# Patient Record
Sex: FEMALE | Race: WHITE | NOT HISPANIC OR LATINO | URBAN - METROPOLITAN AREA
[De-identification: names, ages, dates, MRNs, and addresses within clinical notes are randomized per-mention and may not be internally consistent; named-entity substitution may affect disease eponyms.]

---

## 2023-02-21 PROBLEM — R10.2 PELVIC CRAMPING: Status: ACTIVE | Noted: 2023-02-21

## 2023-02-21 PROBLEM — N20.0 NEPHROLITHIASIS: Status: ACTIVE | Noted: 2023-02-21

## 2023-02-21 PROBLEM — H52.203 ASTIGMATISM OF BOTH EYES: Status: ACTIVE | Noted: 2023-02-21

## 2023-02-21 PROBLEM — M25.372 OTHER INSTABILITY, LEFT ANKLE: Status: ACTIVE | Noted: 2023-02-21

## 2023-02-21 PROBLEM — B37.81 CANDIDA ESOPHAGITIS (MULTI): Status: ACTIVE | Noted: 2023-02-21

## 2023-02-21 PROBLEM — I10 HYPERTENSION: Status: ACTIVE | Noted: 2023-02-21

## 2023-02-21 PROBLEM — K59.00 CONSTIPATION: Status: ACTIVE | Noted: 2023-02-21

## 2023-02-21 PROBLEM — H52.4 ASTIGMATISM OF BOTH EYES WITH PRESBYOPIA: Status: ACTIVE | Noted: 2023-02-21

## 2023-02-21 PROBLEM — H02.839 DERMATOCHALASIS: Status: ACTIVE | Noted: 2023-02-21

## 2023-02-21 PROBLEM — J45.901 ASTHMA EXACERBATION (HHS-HCC): Status: ACTIVE | Noted: 2023-02-21

## 2023-02-21 PROBLEM — R06.00 DYSPNEA: Status: ACTIVE | Noted: 2023-02-21

## 2023-02-21 PROBLEM — J38.3 VOCAL CORD DYSFUNCTION: Status: ACTIVE | Noted: 2023-02-21

## 2023-02-21 PROBLEM — R31.9 BLOOD IN URINE: Status: ACTIVE | Noted: 2023-02-21

## 2023-02-21 PROBLEM — N76.0 VAGINITIS: Status: ACTIVE | Noted: 2023-02-21

## 2023-02-21 PROBLEM — H04.123 DRY EYES: Status: ACTIVE | Noted: 2023-02-21

## 2023-02-21 PROBLEM — B37.0 THRUSH: Status: ACTIVE | Noted: 2023-02-21

## 2023-02-21 PROBLEM — B35.1 NAIL FUNGUS: Status: ACTIVE | Noted: 2023-02-21

## 2023-02-21 PROBLEM — E55.9 VITAMIN D DEFICIENCY: Status: ACTIVE | Noted: 2023-02-21

## 2023-02-21 PROBLEM — F32.A DEPRESSION: Status: ACTIVE | Noted: 2023-02-21

## 2023-02-21 PROBLEM — R79.9 ABNORMAL BLOOD CHEMISTRY: Status: ACTIVE | Noted: 2023-02-21

## 2023-02-21 PROBLEM — M81.0 OSTEOPOROSIS: Status: ACTIVE | Noted: 2023-02-21

## 2023-02-21 PROBLEM — E04.9 THYROID ENLARGEMENT: Status: ACTIVE | Noted: 2023-02-21

## 2023-02-21 PROBLEM — R21 GROIN RASH: Status: ACTIVE | Noted: 2023-02-21

## 2023-02-21 PROBLEM — K63.5 BENIGN COLONIC POLYP: Status: ACTIVE | Noted: 2023-02-21

## 2023-02-21 PROBLEM — H52.209 ASTIGMATISM: Status: ACTIVE | Noted: 2023-02-21

## 2023-02-21 PROBLEM — M54.9 BACK PAIN: Status: ACTIVE | Noted: 2023-02-21

## 2023-02-21 PROBLEM — M79.89 SWELLING OF THIGH: Status: ACTIVE | Noted: 2023-02-21

## 2023-02-21 PROBLEM — F98.8 ATTENTION DEFICIT DISORDER WITHOUT HYPERACTIVITY: Status: ACTIVE | Noted: 2023-02-21

## 2023-02-21 PROBLEM — J31.0 RHINITIS: Status: ACTIVE | Noted: 2023-02-21

## 2023-02-21 PROBLEM — S90.02XA CONTUSION OF ANKLE, LEFT: Status: ACTIVE | Noted: 2023-02-21

## 2023-02-21 PROBLEM — R39.89 URINARY PROBLEM: Status: ACTIVE | Noted: 2023-02-21

## 2023-02-21 PROBLEM — H52.223 MYOPIA OF BOTH EYES WITH REGULAR ASTIGMATISM: Status: ACTIVE | Noted: 2023-02-21

## 2023-02-21 PROBLEM — N60.19 FIBROCYSTIC BREAST DISEASE: Status: ACTIVE | Noted: 2023-02-21

## 2023-02-21 PROBLEM — J01.90 ACUTE SINUSITIS: Status: ACTIVE | Noted: 2023-02-21

## 2023-02-21 PROBLEM — S83.8X9A MENISCAL INJURY: Status: ACTIVE | Noted: 2023-02-21

## 2023-02-21 PROBLEM — E78.5 HYPERLIPIDEMIA: Status: ACTIVE | Noted: 2023-02-21

## 2023-02-21 PROBLEM — L30.9 DERMATITIS, ECZEMATOID: Status: ACTIVE | Noted: 2023-02-21

## 2023-02-21 PROBLEM — R53.83 FATIGUE: Status: ACTIVE | Noted: 2023-02-21

## 2023-02-21 PROBLEM — R79.89 ELEVATED LFTS: Status: ACTIVE | Noted: 2023-02-21

## 2023-02-21 PROBLEM — K58.9 IBS (IRRITABLE BOWEL SYNDROME): Status: ACTIVE | Noted: 2023-02-21

## 2023-02-21 PROBLEM — F31.81 BIPOLAR II DISORDER (MULTI): Status: ACTIVE | Noted: 2023-02-21

## 2023-02-21 PROBLEM — R10.9 ABDOMINAL CRAMPS: Status: ACTIVE | Noted: 2023-02-21

## 2023-02-21 PROBLEM — E21.3 HYPERPARATHYROIDISM (MULTI): Status: ACTIVE | Noted: 2023-02-21

## 2023-02-21 PROBLEM — M25.572 LEFT ANKLE PAIN: Status: ACTIVE | Noted: 2023-02-21

## 2023-02-21 PROBLEM — L23.89 ALLERGIC CONTACT DERMATITIS DUE TO OTHER AGENTS: Status: ACTIVE | Noted: 2023-02-21

## 2023-02-21 PROBLEM — H52.13 MYOPIA OF BOTH EYES WITH REGULAR ASTIGMATISM: Status: ACTIVE | Noted: 2023-02-21

## 2023-02-21 PROBLEM — J32.9 SINUSITIS: Status: ACTIVE | Noted: 2023-02-21

## 2023-02-21 PROBLEM — G47.10 ORGANIC HYPERSOMNIA: Status: ACTIVE | Noted: 2023-02-21

## 2023-02-21 PROBLEM — N95.1 VAGINAL DRYNESS, MENOPAUSAL: Status: ACTIVE | Noted: 2023-02-21

## 2023-02-21 PROBLEM — G93.32 CHRONIC FATIGUE SYNDROME: Status: ACTIVE | Noted: 2023-02-21

## 2023-02-21 PROBLEM — B97.7 HUMAN PAPILLOMA VIRUS INFECTION: Status: ACTIVE | Noted: 2023-02-21

## 2023-02-21 PROBLEM — J45.909 ASTHMA (HHS-HCC): Status: ACTIVE | Noted: 2023-02-21

## 2023-02-21 PROBLEM — M25.50 ARTHRALGIA: Status: ACTIVE | Noted: 2023-02-21

## 2023-02-21 PROBLEM — H25.813 COMBINED FORM OF AGE-RELATED CATARACT, BOTH EYES: Status: ACTIVE | Noted: 2023-02-21

## 2023-02-21 PROBLEM — I73.00 RAYNAUD PHENOMENON: Status: ACTIVE | Noted: 2023-02-21

## 2023-02-21 PROBLEM — J30.0 VASOMOTOR RHINITIS: Status: ACTIVE | Noted: 2023-02-21

## 2023-02-21 PROBLEM — H10.31 ACUTE BACTERIAL CONJUNCTIVITIS OF RIGHT EYE: Status: ACTIVE | Noted: 2023-02-21

## 2023-02-21 PROBLEM — J30.89 ALLERGIC RHINITIS DUE TO MOLD: Status: ACTIVE | Noted: 2023-02-21

## 2023-02-21 PROBLEM — R30.0 DYSURIA: Status: ACTIVE | Noted: 2023-02-21

## 2023-02-21 PROBLEM — H52.209 ASTIGMATISM: Status: RESOLVED | Noted: 2023-02-21 | Resolved: 2023-02-21

## 2023-02-21 PROBLEM — K21.9 GERD WITHOUT ESOPHAGITIS: Status: ACTIVE | Noted: 2023-02-21

## 2023-02-21 PROBLEM — F90.9 ADHD (ATTENTION DEFICIT HYPERACTIVITY DISORDER): Status: ACTIVE | Noted: 2023-02-21

## 2023-02-21 PROBLEM — H52.4 BILATERAL PRESBYOPIA: Status: ACTIVE | Noted: 2023-02-21

## 2023-02-21 PROBLEM — M25.562 ACUTE PAIN OF LEFT KNEE: Status: ACTIVE | Noted: 2023-02-21

## 2023-02-21 PROBLEM — M54.50 LOWER BACK PAIN: Status: ACTIVE | Noted: 2023-02-21

## 2023-02-21 PROBLEM — R31.0 HEMATURIA, GROSS: Status: ACTIVE | Noted: 2023-02-21

## 2023-02-21 PROBLEM — H52.00 HYPEROPIA: Status: ACTIVE | Noted: 2023-02-21

## 2023-02-21 RX ORDER — CICLOPIROX 7.7 MG/G
GEL TOPICAL 2 TIMES DAILY
COMMUNITY
Start: 2021-09-23 | End: 2023-03-27 | Stop reason: SDUPTHER

## 2023-02-21 RX ORDER — LITHIUM CARBONATE 300 MG/1
1 TABLET, FILM COATED, EXTENDED RELEASE ORAL DAILY
COMMUNITY
Start: 2016-12-30

## 2023-02-21 RX ORDER — LAMOTRIGINE 200 MG/1
1 TABLET ORAL DAILY
COMMUNITY
Start: 2016-09-08 | End: 2024-04-25 | Stop reason: SDUPTHER

## 2023-02-21 RX ORDER — ALCLOMETASONE DIPROPIONATE 0.5 MG/G
CREAM TOPICAL
COMMUNITY
Start: 2022-01-28 | End: 2023-11-26

## 2023-02-21 RX ORDER — DENOSUMAB 60 MG/ML
60 INJECTION SUBCUTANEOUS
COMMUNITY
Start: 2019-09-08 | End: 2023-11-24

## 2023-02-21 RX ORDER — LISDEXAMFETAMINE DIMESYLATE 60 MG/1
1 CAPSULE ORAL EVERY MORNING
COMMUNITY
Start: 2020-06-23 | End: 2024-05-22 | Stop reason: WASHOUT

## 2023-02-21 RX ORDER — METOPROLOL SUCCINATE 25 MG/1
0.5 TABLET, EXTENDED RELEASE ORAL DAILY
COMMUNITY
Start: 2013-05-31 | End: 2023-06-02 | Stop reason: SDUPTHER

## 2023-02-21 RX ORDER — CHOLECALCIFEROL (VITAMIN D3) 125 MCG
1 CAPSULE ORAL DAILY
COMMUNITY
Start: 2013-05-30

## 2023-02-21 RX ORDER — ALBUTEROL SULFATE 90 UG/1
2 AEROSOL, METERED RESPIRATORY (INHALATION)
COMMUNITY
Start: 2013-02-15 | End: 2024-01-15 | Stop reason: SDUPTHER

## 2023-02-21 RX ORDER — OMEPRAZOLE 20 MG/1
1 CAPSULE, DELAYED RELEASE ORAL
COMMUNITY
Start: 2016-09-26 | End: 2024-01-15 | Stop reason: SDUPTHER

## 2023-02-21 RX ORDER — MOMETASONE FUROATE AND FORMOTEROL FUMARATE DIHYDRATE 200; 5 UG/1; UG/1
2 AEROSOL RESPIRATORY (INHALATION) 2 TIMES DAILY
COMMUNITY
Start: 2022-06-10 | End: 2024-01-15 | Stop reason: SDUPTHER

## 2023-02-21 RX ORDER — MOMETASONE FUROATE AND FORMOTEROL FUMARATE DIHYDRATE 100; 5 UG/1; UG/1
2 AEROSOL RESPIRATORY (INHALATION)
COMMUNITY
Start: 2021-05-25 | End: 2024-01-15 | Stop reason: WASHOUT

## 2023-02-21 RX ORDER — LEVOCETIRIZINE DIHYDROCHLORIDE 5 MG/1
1 TABLET, FILM COATED ORAL EVERY EVENING
COMMUNITY
Start: 2022-01-28 | End: 2024-01-15 | Stop reason: SDUPTHER

## 2023-02-21 RX ORDER — MIRTAZAPINE 7.5 MG/1
1 TABLET, FILM COATED ORAL NIGHTLY
COMMUNITY
Start: 2016-06-08 | End: 2024-04-25 | Stop reason: SDUPTHER

## 2023-03-15 ENCOUNTER — TELEPHONE (OUTPATIENT)
Dept: PRIMARY CARE | Facility: CLINIC | Age: 68
End: 2023-03-15
Payer: MEDICARE

## 2023-03-19 ASSESSMENT — PROMIS GLOBAL HEALTH SCALE
RATE_MENTAL_HEALTH: GOOD
CARRYOUT_PHYSICAL_ACTIVITIES: MOSTLY
RATE_AVERAGE_FATIGUE: MODERATE
CARRYOUT_SOCIAL_ACTIVITIES: GOOD
RATE_GENERAL_HEALTH: GOOD
RATE_PHYSICAL_HEALTH: GOOD
RATE_SOCIAL_SATISFACTION: GOOD
RATE_QUALITY_OF_LIFE: GOOD
EMOTIONAL_PROBLEMS: RARELY
RATE_AVERAGE_PAIN: 4

## 2023-03-20 LAB
ALANINE AMINOTRANSFERASE (SGPT) (U/L) IN SER/PLAS: 20 U/L (ref 7–45)
ANION GAP IN SER/PLAS: 13 MMOL/L (ref 10–20)
APPEARANCE, URINE: CLEAR
BACTERIA, URINE: ABNORMAL /HPF
BASOPHILS (10*3/UL) IN BLOOD BY AUTOMATED COUNT: 0.04 X10E9/L (ref 0–0.1)
BASOPHILS/100 LEUKOCYTES IN BLOOD BY AUTOMATED COUNT: 0.7 % (ref 0–2)
BILIRUBIN, URINE: NEGATIVE
BLOOD, URINE: NEGATIVE
CALCIDIOL (25 OH VITAMIN D3) (NG/ML) IN SER/PLAS: 39 NG/ML
CALCIUM (MG/DL) IN SER/PLAS: 9.6 MG/DL (ref 8.6–10.6)
CARBON DIOXIDE, TOTAL (MMOL/L) IN SER/PLAS: 27 MMOL/L (ref 21–32)
CHLORIDE (MMOL/L) IN SER/PLAS: 105 MMOL/L (ref 98–107)
CHOLESTEROL (MG/DL) IN SER/PLAS: 267 MG/DL (ref 0–199)
CHOLESTEROL IN HDL (MG/DL) IN SER/PLAS: 70.6 MG/DL
CHOLESTEROL/HDL RATIO: 3.8
COBALAMIN (VITAMIN B12) (PG/ML) IN SER/PLAS: 307 PG/ML (ref 211–911)
COLOR, URINE: YELLOW
CREATININE (MG/DL) IN SER/PLAS: 1.01 MG/DL (ref 0.5–1.05)
EOSINOPHILS (10*3/UL) IN BLOOD BY AUTOMATED COUNT: 0.14 X10E9/L (ref 0–0.7)
EOSINOPHILS/100 LEUKOCYTES IN BLOOD BY AUTOMATED COUNT: 2.5 % (ref 0–6)
ERYTHROCYTE DISTRIBUTION WIDTH (RATIO) BY AUTOMATED COUNT: 13.1 % (ref 11.5–14.5)
ERYTHROCYTE MEAN CORPUSCULAR HEMOGLOBIN CONCENTRATION (G/DL) BY AUTOMATED: 31.5 G/DL (ref 32–36)
ERYTHROCYTE MEAN CORPUSCULAR VOLUME (FL) BY AUTOMATED COUNT: 95 FL (ref 80–100)
ERYTHROCYTES (10*6/UL) IN BLOOD BY AUTOMATED COUNT: 4.33 X10E12/L (ref 4–5.2)
GFR FEMALE: 62 ML/MIN/1.73M2
GLUCOSE (MG/DL) IN SER/PLAS: 91 MG/DL (ref 74–99)
GLUCOSE, URINE: NEGATIVE MG/DL
HEMATOCRIT (%) IN BLOOD BY AUTOMATED COUNT: 41 % (ref 36–46)
HEMOGLOBIN (G/DL) IN BLOOD: 12.9 G/DL (ref 12–16)
IMMATURE GRANULOCYTES/100 LEUKOCYTES IN BLOOD BY AUTOMATED COUNT: 1.6 % (ref 0–0.9)
KETONES, URINE: NEGATIVE MG/DL
LDL: 170 MG/DL (ref 0–99)
LEUKOCYTE ESTERASE, URINE: ABNORMAL
LEUKOCYTES (10*3/UL) IN BLOOD BY AUTOMATED COUNT: 5.6 X10E9/L (ref 4.4–11.3)
LYMPHOCYTES (10*3/UL) IN BLOOD BY AUTOMATED COUNT: 1.99 X10E9/L (ref 1.2–4.8)
LYMPHOCYTES/100 LEUKOCYTES IN BLOOD BY AUTOMATED COUNT: 35.8 % (ref 13–44)
MONOCYTES (10*3/UL) IN BLOOD BY AUTOMATED COUNT: 0.47 X10E9/L (ref 0.1–1)
MONOCYTES/100 LEUKOCYTES IN BLOOD BY AUTOMATED COUNT: 8.5 % (ref 2–10)
NEUTROPHILS (10*3/UL) IN BLOOD BY AUTOMATED COUNT: 2.83 X10E9/L (ref 1.2–7.7)
NEUTROPHILS/100 LEUKOCYTES IN BLOOD BY AUTOMATED COUNT: 50.9 % (ref 40–80)
NITRITE, URINE: NEGATIVE
NRBC (PER 100 WBCS) BY AUTOMATED COUNT: 0 /100 WBC (ref 0–0)
PH, URINE: 7 (ref 5–8)
PLATELETS (10*3/UL) IN BLOOD AUTOMATED COUNT: 309 X10E9/L (ref 150–450)
POTASSIUM (MMOL/L) IN SER/PLAS: 4.5 MMOL/L (ref 3.5–5.3)
PROTEIN, URINE: NEGATIVE MG/DL
RBC, URINE: 1 /HPF (ref 0–5)
SODIUM (MMOL/L) IN SER/PLAS: 140 MMOL/L (ref 136–145)
SPECIFIC GRAVITY, URINE: 1.01 (ref 1–1.03)
SQUAMOUS EPITHELIAL CELLS, URINE: <1 /HPF
THYROTROPIN (MIU/L) IN SER/PLAS BY DETECTION LIMIT <= 0.05 MIU/L: 1.22 MIU/L (ref 0.44–3.98)
TRIGLYCERIDE (MG/DL) IN SER/PLAS: 132 MG/DL (ref 0–149)
UREA NITROGEN (MG/DL) IN SER/PLAS: 13 MG/DL (ref 6–23)
UROBILINOGEN, URINE: <2 MG/DL (ref 0–1.9)
VLDL: 26 MG/DL (ref 0–40)
WBC, URINE: 4 /HPF (ref 0–5)

## 2023-03-21 ENCOUNTER — OFFICE VISIT (OUTPATIENT)
Dept: PRIMARY CARE | Facility: CLINIC | Age: 68
End: 2023-03-21
Payer: MEDICARE

## 2023-03-21 VITALS
OXYGEN SATURATION: 99 % | HEIGHT: 59 IN | BODY MASS INDEX: 28.02 KG/M2 | WEIGHT: 139 LBS | DIASTOLIC BLOOD PRESSURE: 82 MMHG | SYSTOLIC BLOOD PRESSURE: 132 MMHG | HEART RATE: 74 BPM | TEMPERATURE: 97.8 F

## 2023-03-21 DIAGNOSIS — Z00.00 WELL ADULT EXAM: Primary | ICD-10-CM

## 2023-03-21 DIAGNOSIS — L03.019 PARONYCHIA OF FINGER, UNSPECIFIED LATERALITY: ICD-10-CM

## 2023-03-21 DIAGNOSIS — M79.671 RIGHT FOOT PAIN: ICD-10-CM

## 2023-03-21 PROCEDURE — 1170F FXNL STATUS ASSESSED: CPT | Performed by: INTERNAL MEDICINE

## 2023-03-21 PROCEDURE — G0439 PPPS, SUBSEQ VISIT: HCPCS | Performed by: INTERNAL MEDICINE

## 2023-03-21 PROCEDURE — 3079F DIAST BP 80-89 MM HG: CPT | Performed by: INTERNAL MEDICINE

## 2023-03-21 PROCEDURE — 1159F MED LIST DOCD IN RCRD: CPT | Performed by: INTERNAL MEDICINE

## 2023-03-21 PROCEDURE — 3075F SYST BP GE 130 - 139MM HG: CPT | Performed by: INTERNAL MEDICINE

## 2023-03-21 RX ORDER — CICLOPIROX 80 MG/ML
SOLUTION TOPICAL ONCE
Status: SHIPPED | OUTPATIENT
Start: 2023-03-21

## 2023-03-21 RX ORDER — LISDEXAMFETAMINE DIMESYLATE 60 MG/1
1 CAPSULE ORAL
COMMUNITY
Start: 2023-04-06 | End: 2024-05-22 | Stop reason: WASHOUT

## 2023-03-21 RX ORDER — TRAMADOL HYDROCHLORIDE 50 MG/1
1 TABLET ORAL EVERY 6 HOURS PRN
COMMUNITY
Start: 2023-02-02 | End: 2024-01-31 | Stop reason: WASHOUT

## 2023-03-21 ASSESSMENT — COLUMBIA-SUICIDE SEVERITY RATING SCALE - C-SSRS
2. HAVE YOU ACTUALLY HAD ANY THOUGHTS OF KILLING YOURSELF?: NO
6. HAVE YOU EVER DONE ANYTHING, STARTED TO DO ANYTHING, OR PREPARED TO DO ANYTHING TO END YOUR LIFE?: NO
1. IN THE PAST MONTH, HAVE YOU WISHED YOU WERE DEAD OR WISHED YOU COULD GO TO SLEEP AND NOT WAKE UP?: NO

## 2023-03-21 ASSESSMENT — ACTIVITIES OF DAILY LIVING (ADL)
TOILETING: INDEPENDENT
MANAGING_FINANCES: INDEPENDENT
GROCERY_SHOPPING: INDEPENDENT
PATIENT'S MEMORY ADEQUATE TO SAFELY COMPLETE DAILY ACTIVITIES?: YES
TAKING_MEDICATION: INDEPENDENT
DRESSING YOURSELF: INDEPENDENT
FEEDING YOURSELF: INDEPENDENT
GROOMING: INDEPENDENT
DRESSING: INDEPENDENT
BATHING: INDEPENDENT
JUDGMENT_ADEQUATE_SAFELY_COMPLETE_DAILY_ACTIVITIES: YES
BATHING: INDEPENDENT
WALKS IN HOME: INDEPENDENT

## 2023-03-21 ASSESSMENT — ENCOUNTER SYMPTOMS
OCCASIONAL FEELINGS OF UNSTEADINESS: 0
LOSS OF SENSATION IN FEET: 0
DEPRESSION: 0

## 2023-03-21 ASSESSMENT — VISUAL ACUITY
OD_CC: 20/25
OS_CC: 20/25

## 2023-03-21 NOTE — PROGRESS NOTES
"Subjective   Patient ID: Missy Patton is a 67 y.o. female who presents for Medicare Annual Wellness Visit Initial.  Patient comes in for a physical examination, doing well over - all with no particular complaints.   Also in for laboratory review and health maintenance update.  Updating family history as well.          Review of Systems    Objective   Physical Exam  Constitutional:       Appearance: Normal appearance.   HENT:      Head: Normocephalic.   Eyes:      Extraocular Movements: Extraocular movements intact.      Conjunctiva/sclera: Conjunctivae normal.      Pupils: Pupils are equal, round, and reactive to light.   Cardiovascular:      Rate and Rhythm: Normal rate and regular rhythm.   Pulmonary:      Effort: Pulmonary effort is normal.      Breath sounds: Normal breath sounds.   Abdominal:      General: Abdomen is flat. Bowel sounds are normal.      Palpations: Abdomen is soft.   Musculoskeletal:         General: Normal range of motion.      Cervical back: Normal range of motion.   Skin:     General: Skin is warm and dry.   Neurological:      General: No focal deficit present.      Mental Status: She is alert and oriented to person, place, and time. Mental status is at baseline.   Psychiatric:         Mood and Affect: Mood normal.         Behavior: Behavior normal.       /82   Pulse 74   Temp 36.6 °C (97.8 °F)   Ht 1.499 m (4' 11\")   Wt 63 kg (139 lb)   SpO2 99%   BMI 28.07 kg/m²         Assessment/Plan   Problem List Items Addressed This Visit          Other    Well adult exam - Primary   ASSESSMENT AND PLAN: Patient on examination is in good health, will do screening blood tests to screen for high cholesterol, diabetes, thyroid. Patient should be taking enough calcium in a balanced diet or supplements to total 1200 mg a day in divided doses unless with history of specific types of kidney stones. Vitamin D 800-1000 IU a day, check levels if not taking any supplements. For Female Patients " Only: Mammogram and PAP test yearly unless s/p MO, Bone Density test every two years.Preventive Medicine: colon cancer screening by age 50 if no family history, balanced diet, and exercise as discussed. Seat belt use for injury prevention, living will. Substance use and /or tobacco use counseled when applicable. Alcohol use discussed, use designated . Immunizations TD age 50 and every 10 years. Pneumovax and shingles vaccine counseled. Yearly flu vaccine unless contraindicated. More than 50% of office visit time spent counseling the patient, questions were answered. If problems arise, patient is to call or come back in. It is understood that the responsibility of healthcare is shared by the patient by following a healthy lifestyle and following the plan above as discussed. Complete physical examination in a year.Patient knows to call for lab results in two weeks if she does not receive letter or call from our office

## 2023-03-27 DIAGNOSIS — R21 RASH: Primary | ICD-10-CM

## 2023-03-27 RX ORDER — CICLOPIROX 7.7 MG/G
1 GEL TOPICAL 2 TIMES DAILY
COMMUNITY
Start: 2021-09-23

## 2023-03-28 RX ORDER — CICLOPIROX 7.7 MG/G
GEL TOPICAL DAILY
Qty: 45 G | Refills: 5 | Status: SHIPPED | OUTPATIENT
Start: 2023-03-28 | End: 2024-05-30 | Stop reason: WASHOUT

## 2023-04-17 NOTE — TELEPHONE ENCOUNTER
She was needing a PA for a certain medication but just then realized we never prescribed it for her. .... TORSTENI

## 2023-06-02 DIAGNOSIS — I10 PRIMARY HYPERTENSION: Primary | ICD-10-CM

## 2023-06-02 RX ORDER — METOPROLOL SUCCINATE 25 MG/1
12.5 TABLET, EXTENDED RELEASE ORAL DAILY
Qty: 90 TABLET | Refills: 0 | Status: SHIPPED | OUTPATIENT
Start: 2023-06-02 | End: 2023-12-11 | Stop reason: SDUPTHER

## 2023-06-15 DIAGNOSIS — M76.70 PERONEAL TENDINITIS, UNSPECIFIED LATERALITY: ICD-10-CM

## 2023-06-22 LAB
ALANINE AMINOTRANSFERASE (SGPT) (U/L) IN SER/PLAS: 26 U/L (ref 7–45)
ALBUMIN (G/DL) IN SER/PLAS: 4.7 G/DL (ref 3.4–5)
ALKALINE PHOSPHATASE (U/L) IN SER/PLAS: 84 U/L (ref 33–136)
ANION GAP IN SER/PLAS: 16 MMOL/L (ref 10–20)
ASPARTATE AMINOTRANSFERASE (SGOT) (U/L) IN SER/PLAS: 48 U/L (ref 9–39)
BILIRUBIN TOTAL (MG/DL) IN SER/PLAS: 0.5 MG/DL (ref 0–1.2)
CALCIUM (MG/DL) IN SER/PLAS: 9.9 MG/DL (ref 8.6–10.6)
CARBON DIOXIDE, TOTAL (MMOL/L) IN SER/PLAS: 24 MMOL/L (ref 21–32)
CHLORIDE (MMOL/L) IN SER/PLAS: 105 MMOL/L (ref 98–107)
CREATININE (MG/DL) IN SER/PLAS: 1.17 MG/DL (ref 0.5–1.05)
GFR FEMALE: 52 ML/MIN/1.73M2
GLUCOSE (MG/DL) IN SER/PLAS: 73 MG/DL (ref 74–99)
POTASSIUM (MMOL/L) IN SER/PLAS: 4.1 MMOL/L (ref 3.5–5.3)
PROTEIN TOTAL: 6.9 G/DL (ref 6.4–8.2)
SODIUM (MMOL/L) IN SER/PLAS: 141 MMOL/L (ref 136–145)
UREA NITROGEN (MG/DL) IN SER/PLAS: 24 MG/DL (ref 6–23)

## 2023-09-19 PROBLEM — H52.00 HYPEROPIA WITH PRESBYOPIA: Status: ACTIVE | Noted: 2023-09-19

## 2023-09-19 PROBLEM — D22.60 MELANOCYTIC NEVI OF UNSPECIFIED UPPER LIMB, INCLUDING SHOULDER: Status: ACTIVE | Noted: 2019-02-27

## 2023-09-19 PROBLEM — M25.611 STIFFNESS OF RIGHT SHOULDER JOINT: Status: ACTIVE | Noted: 2023-09-19

## 2023-09-19 PROBLEM — H52.209 ASTIGMATISM: Status: ACTIVE | Noted: 2023-09-19

## 2023-09-19 PROBLEM — L81.4 OTHER MELANIN HYPERPIGMENTATION: Status: ACTIVE | Noted: 2019-02-27

## 2023-09-19 PROBLEM — K63.5 COLON POLYPS: Status: ACTIVE | Noted: 2023-09-19

## 2023-09-19 PROBLEM — H04.123 DRY EYES, BILATERAL: Status: ACTIVE | Noted: 2023-09-19

## 2023-09-19 PROBLEM — Z41.9 SURGICAL PROCEDURE, ELECTIVE: Status: ACTIVE | Noted: 2023-09-19

## 2023-09-19 PROBLEM — D22.39 MELANOCYTIC NEVI OF OTHER PARTS OF FACE: Status: ACTIVE | Noted: 2019-02-27

## 2023-09-19 PROBLEM — M79.604 PAIN OF BACK AND RIGHT LOWER EXTREMITY: Status: ACTIVE | Noted: 2023-09-19

## 2023-09-19 PROBLEM — M25.811 SHOULDER IMPINGEMENT, RIGHT: Status: ACTIVE | Noted: 2023-09-19

## 2023-09-19 PROBLEM — R20.2 PARESTHESIA OF SKIN: Status: ACTIVE | Noted: 2019-02-27

## 2023-09-19 PROBLEM — L60.3 KOILONYCHIA: Status: ACTIVE | Noted: 2019-02-27

## 2023-09-19 PROBLEM — D18.01 HEMANGIOMA OF SKIN AND SUBCUTANEOUS TISSUE: Status: ACTIVE | Noted: 2019-02-27

## 2023-09-19 PROBLEM — D22.70 MELANOCYTIC NEVI OF UNSPECIFIED LOWER LIMB, INCLUDING HIP: Status: ACTIVE | Noted: 2019-02-27

## 2023-09-19 PROBLEM — D48.5 NEOPLASM OF UNCERTAIN BEHAVIOR OF SKIN: Status: ACTIVE | Noted: 2019-02-27

## 2023-09-19 PROBLEM — H52.4 HYPEROPIA WITH PRESBYOPIA: Status: ACTIVE | Noted: 2023-09-19

## 2023-09-19 PROBLEM — L60.1 ONYCHOLYSIS: Status: ACTIVE | Noted: 2019-02-27

## 2023-09-19 PROBLEM — H52.203 ASTIGMATISM OF BOTH EYES: Status: ACTIVE | Noted: 2023-09-19

## 2023-09-19 PROBLEM — L85.3 XEROSIS CUTIS: Status: ACTIVE | Noted: 2019-02-27

## 2023-09-19 PROBLEM — D22.5 MELANOCYTIC NEVI OF TRUNK: Status: ACTIVE | Noted: 2019-02-27

## 2023-09-19 PROBLEM — L82.1 OTHER SEBORRHEIC KERATOSIS: Status: ACTIVE | Noted: 2019-02-27

## 2023-09-19 PROBLEM — L57.9 SKIN CHANGES DUE TO CHRONIC EXPOSURE TO NONIONIZING RADIATION, UNSPECIFIED: Status: ACTIVE | Noted: 2019-02-27

## 2023-09-19 PROBLEM — M25.511 ACUTE PAIN OF RIGHT SHOULDER: Status: ACTIVE | Noted: 2023-09-19

## 2023-09-19 PROBLEM — M54.9 PAIN OF BACK AND RIGHT LOWER EXTREMITY: Status: ACTIVE | Noted: 2023-09-19

## 2023-09-19 RX ORDER — DEXTROAMPHETAMINE SACCHARATE, AMPHETAMINE ASPARTATE MONOHYDRATE, DEXTROAMPHETAMINE SULFATE AND AMPHETAMINE SULFATE 7.5; 7.5; 7.5; 7.5 MG/1; MG/1; MG/1; MG/1
1 CAPSULE, EXTENDED RELEASE ORAL DAILY
COMMUNITY
Start: 2023-08-11 | End: 2024-01-31 | Stop reason: WASHOUT

## 2023-09-19 RX ORDER — METHYLPREDNISOLONE 4 MG/1
TABLET ORAL
COMMUNITY
Start: 2023-05-30 | End: 2024-01-31 | Stop reason: WASHOUT

## 2023-09-19 RX ORDER — METRONIDAZOLE 250 MG/1
TABLET ORAL
COMMUNITY
Start: 2023-05-27 | End: 2024-01-31 | Stop reason: WASHOUT

## 2023-10-03 ENCOUNTER — TELEPHONE (OUTPATIENT)
Dept: PRIMARY CARE | Facility: CLINIC | Age: 68
End: 2023-10-03
Payer: MEDICARE

## 2023-10-03 DIAGNOSIS — R05.1 ACUTE COUGH: Primary | ICD-10-CM

## 2023-10-03 RX ORDER — AZITHROMYCIN 250 MG/1
TABLET, FILM COATED ORAL
Qty: 6 TABLET | Refills: 0 | Status: SHIPPED | OUTPATIENT
Start: 2023-10-03 | End: 2023-10-08

## 2023-10-03 NOTE — TELEPHONE ENCOUNTER
Onset 9/25 congestion in lungs --thick green mucus, neg Covid test 10/1.  No nasal congestion, no sore throat, no fever.   Feels fine other than the lung congestion.     Pt asked if you want to Rx to [facility] on [address] in [place]?

## 2023-11-01 ENCOUNTER — TELEPHONE (OUTPATIENT)
Dept: OTHER | Age: 68
End: 2023-11-01
Payer: MEDICARE

## 2023-11-01 NOTE — TELEPHONE ENCOUNTER
Please contact the patient in regards to a medication refill concern. She will be traveling and will not have enough medication to cover while she is gone.

## 2023-11-24 DIAGNOSIS — M81.0 OSTEOPOROSIS, UNSPECIFIED OSTEOPOROSIS TYPE, UNSPECIFIED PATHOLOGICAL FRACTURE PRESENCE: Primary | ICD-10-CM

## 2023-11-24 RX ORDER — DENOSUMAB 60 MG/ML
60 INJECTION SUBCUTANEOUS
Qty: 1 ML | Refills: 1 | Status: SHIPPED | OUTPATIENT
Start: 2023-11-24

## 2023-12-06 ENCOUNTER — TELEMEDICINE (OUTPATIENT)
Dept: BEHAVIORAL HEALTH | Facility: CLINIC | Age: 68
End: 2023-12-06
Payer: MEDICARE

## 2023-12-06 DIAGNOSIS — F31.9 BIPOLAR 1 DISORDER (MULTI): ICD-10-CM

## 2023-12-06 DIAGNOSIS — R05.1 ACUTE COUGH: Primary | ICD-10-CM

## 2023-12-06 DIAGNOSIS — F31.81 BIPOLAR II DISORDER (MULTI): ICD-10-CM

## 2023-12-06 DIAGNOSIS — F90.2 ATTENTION DEFICIT HYPERACTIVITY DISORDER (ADHD), COMBINED TYPE: ICD-10-CM

## 2023-12-06 PROCEDURE — 99214 OFFICE O/P EST MOD 30 MIN: CPT | Performed by: PSYCHIATRY & NEUROLOGY

## 2023-12-06 RX ORDER — LISDEXAMFETAMINE DIMESYLATE 60 MG/1
60 CAPSULE ORAL EVERY MORNING
Qty: 30 CAPSULE | Refills: 0 | Status: SHIPPED | OUTPATIENT
Start: 2024-02-04 | End: 2024-01-31 | Stop reason: WASHOUT

## 2023-12-06 RX ORDER — LISDEXAMFETAMINE DIMESYLATE 60 MG/1
60 CAPSULE ORAL EVERY MORNING
Qty: 30 CAPSULE | Refills: 0 | Status: SHIPPED | OUTPATIENT
Start: 2023-12-06 | End: 2024-01-31 | Stop reason: WASHOUT

## 2023-12-06 RX ORDER — LISDEXAMFETAMINE DIMESYLATE 60 MG/1
60 CAPSULE ORAL EVERY MORNING
Qty: 30 CAPSULE | Refills: 0 | Status: SHIPPED | OUTPATIENT
Start: 2024-01-05 | End: 2024-01-30 | Stop reason: SDUPTHER

## 2023-12-06 ASSESSMENT — ENCOUNTER SYMPTOMS: PSYCHIATRIC NEGATIVE: 1

## 2023-12-06 NOTE — TELEPHONE ENCOUNTER
Would like Rx [facility] for [facility].  Onset 12/3: facial pain, headache, no dizziness, congestion in head & nasal sinuses, no fever.  Covid neg 12/1 and 12/4 (exposed on Thanksgiving to Covid and headache started 12/1).  Overall not feeling well.      Also needs metoprolol refilled 90 days.

## 2023-12-06 NOTE — PROGRESS NOTES
Subjective   Patient ID: Missy Patton is a 68 y.o. female who presents for medication management and brief psychotherapy..  HPI patient seen interval psych history reviewed.  Patient continues to benefit greatly from the current medication regimen.  This was a follow-up visit and it appears that she is due for lithium monitoring blood work.  Patient discussed her limited relationship with a narcissistic man, discussing his very positive points, but these do not outweigh the negatives that come along with dealing with a narcissist.  Physically the patient is having some symptoms which could be anxiety equivalents including itching.  She has a sinus infection currently and had some food poisoning around Thanksgiving that she is recovering from.    Review of Systems   Psychiatric/Behavioral: Negative.         Objective   Physical Exam  Psychiatric:         Attention and Perception: Attention and perception normal.         Mood and Affect: Mood and affect normal.         Speech: Speech normal.         Behavior: Behavior normal. Behavior is cooperative.         Thought Content: Thought content normal.         Cognition and Memory: Cognition and memory normal.         Judgment: Judgment normal.         Assessment/Plan   Problem List Items Addressed This Visit             ICD-10-CM    ADHD (attention deficit hyperactivity disorder) F90.9    Relevant Medications    lisdexamfetamine (Vyvanse) 60 mg capsule    lisdexamfetamine (Vyvanse) 60 mg capsule (Start on 1/5/2024)    lisdexamfetamine (Vyvanse) 60 mg capsule (Start on 2/4/2024)    Other Relevant Orders    Lithium level    Basic metabolic panel    T4    TSH    Bipolar II disorder (CMS/HCC) F31.81     Continue current med regimen to prevent symptom recurrence.  Follow-up in 3 months.          Other Visit Diagnoses         Codes    Bipolar 1 disorder (CMS/HCC)     F31.9    Relevant Orders    Lithium level    Basic metabolic panel    T4    TSH                 Gutierrez TA  MD Maggie 12/06/23 3:12 PM

## 2023-12-07 RX ORDER — AZITHROMYCIN 250 MG/1
TABLET, FILM COATED ORAL
Qty: 6 TABLET | Refills: 0 | Status: SHIPPED | OUTPATIENT
Start: 2023-12-07 | End: 2024-05-30 | Stop reason: WASHOUT

## 2023-12-07 RX ORDER — AZITHROMYCIN 250 MG/1
250 TABLET, FILM COATED ORAL 2 TIMES DAILY
COMMUNITY
Start: 2023-12-07 | End: 2023-12-07 | Stop reason: SDUPTHER

## 2023-12-07 NOTE — TELEPHONE ENCOUNTER
Called patient to let her know [facility] will send over antibiotic later on today no answer left v/meli, AM

## 2023-12-11 DIAGNOSIS — I10 PRIMARY HYPERTENSION: Primary | ICD-10-CM

## 2023-12-11 RX ORDER — METOPROLOL SUCCINATE 25 MG/1
12.5 TABLET, EXTENDED RELEASE ORAL DAILY
Qty: 90 TABLET | Refills: 1 | Status: SHIPPED | OUTPATIENT
Start: 2023-12-11 | End: 2024-05-23 | Stop reason: SDUPTHER

## 2023-12-12 ENCOUNTER — TELEPHONE (OUTPATIENT)
Dept: ENDOCRINOLOGY | Facility: CLINIC | Age: 68
End: 2023-12-12
Payer: MEDICARE

## 2023-12-12 DIAGNOSIS — M81.0 OSTEOPOROSIS, UNSPECIFIED OSTEOPOROSIS TYPE, UNSPECIFIED PATHOLOGICAL FRACTURE PRESENCE: Primary | ICD-10-CM

## 2023-12-12 NOTE — TELEPHONE ENCOUNTER
Pt has ov on 12/14/23 for a prolia injection pt would like to get any needed blood work prior to ov

## 2023-12-28 ENCOUNTER — LAB (OUTPATIENT)
Dept: LAB | Facility: LAB | Age: 68
End: 2023-12-28
Payer: MEDICARE

## 2023-12-28 ENCOUNTER — OFFICE VISIT (OUTPATIENT)
Dept: ENDOCRINOLOGY | Facility: CLINIC | Age: 68
End: 2023-12-28
Payer: MEDICARE

## 2023-12-28 DIAGNOSIS — M81.0 OSTEOPOROSIS, UNSPECIFIED OSTEOPOROSIS TYPE, UNSPECIFIED PATHOLOGICAL FRACTURE PRESENCE: ICD-10-CM

## 2023-12-28 DIAGNOSIS — F31.9 BIPOLAR 1 DISORDER (MULTI): ICD-10-CM

## 2023-12-28 DIAGNOSIS — M81.0 OSTEOPOROSIS, UNSPECIFIED OSTEOPOROSIS TYPE, UNSPECIFIED PATHOLOGICAL FRACTURE PRESENCE: Primary | ICD-10-CM

## 2023-12-28 DIAGNOSIS — F90.2 ATTENTION DEFICIT HYPERACTIVITY DISORDER (ADHD), COMBINED TYPE: ICD-10-CM

## 2023-12-28 LAB
ALBUMIN SERPL BCP-MCNC: 4.4 G/DL (ref 3.4–5)
ALP SERPL-CCNC: 89 U/L (ref 33–136)
ALT SERPL W P-5'-P-CCNC: 18 U/L (ref 7–45)
ANION GAP SERPL CALC-SCNC: 12 MMOL/L (ref 10–20)
AST SERPL W P-5'-P-CCNC: 21 U/L (ref 9–39)
BILIRUB SERPL-MCNC: 0.4 MG/DL (ref 0–1.2)
BUN SERPL-MCNC: 14 MG/DL (ref 6–23)
CALCIUM SERPL-MCNC: 9.3 MG/DL (ref 8.6–10.6)
CHLORIDE SERPL-SCNC: 106 MMOL/L (ref 98–107)
CO2 SERPL-SCNC: 28 MMOL/L (ref 21–32)
CREAT SERPL-MCNC: 1.03 MG/DL (ref 0.5–1.05)
GFR SERPL CREATININE-BSD FRML MDRD: 60 ML/MIN/1.73M*2
GLUCOSE SERPL-MCNC: 92 MG/DL (ref 74–99)
LITHIUM SERPL-SCNC: 0.61 MMOL/L (ref 0.6–1.2)
POTASSIUM SERPL-SCNC: 4.5 MMOL/L (ref 3.5–5.3)
PROT SERPL-MCNC: 6.5 G/DL (ref 6.4–8.2)
SODIUM SERPL-SCNC: 141 MMOL/L (ref 136–145)
T4 SERPL-MCNC: 9.7 UG/DL (ref 4.5–11.1)
TSH SERPL-ACNC: 1.25 MIU/L (ref 0.44–3.98)

## 2023-12-28 PROCEDURE — 84436 ASSAY OF TOTAL THYROXINE: CPT

## 2023-12-28 PROCEDURE — 84443 ASSAY THYROID STIM HORMONE: CPT

## 2023-12-28 PROCEDURE — 80178 ASSAY OF LITHIUM: CPT

## 2023-12-28 PROCEDURE — 36415 COLL VENOUS BLD VENIPUNCTURE: CPT

## 2023-12-28 PROCEDURE — 80053 COMPREHEN METABOLIC PANEL: CPT

## 2023-12-28 PROCEDURE — 1159F MED LIST DOCD IN RCRD: CPT | Performed by: INTERNAL MEDICINE

## 2023-12-28 PROCEDURE — 96372 THER/PROPH/DIAG INJ SC/IM: CPT | Performed by: INTERNAL MEDICINE

## 2023-12-28 PROCEDURE — 1126F AMNT PAIN NOTED NONE PRSNT: CPT | Performed by: INTERNAL MEDICINE

## 2023-12-28 NOTE — PROGRESS NOTES
Subjective   Patient ID: Missy Patton is a 68 y.o. female who presents for Osteoporosis and to get prolia injection.   HPI    Review of Systems    Objective   Physical Exam    Assessment/Plan            Sandee Lu CMA 12/28/23 11:24 AM

## 2024-01-15 ENCOUNTER — OFFICE VISIT (OUTPATIENT)
Dept: ALLERGY | Facility: CLINIC | Age: 69
End: 2024-01-15
Payer: MEDICARE

## 2024-01-15 VITALS
SYSTOLIC BLOOD PRESSURE: 167 MMHG | DIASTOLIC BLOOD PRESSURE: 93 MMHG | HEIGHT: 58 IN | OXYGEN SATURATION: 95 % | WEIGHT: 146.2 LBS | BODY MASS INDEX: 30.69 KG/M2 | HEART RATE: 73 BPM

## 2024-01-15 DIAGNOSIS — J45.40 MODERATE PERSISTENT ASTHMA WITHOUT COMPLICATION (HHS-HCC): ICD-10-CM

## 2024-01-15 DIAGNOSIS — J30.89 ALLERGIC RHINITIS DUE TO MOLD: Primary | ICD-10-CM

## 2024-01-15 DIAGNOSIS — J32.8 OTHER CHRONIC SINUSITIS: ICD-10-CM

## 2024-01-15 PROCEDURE — 3080F DIAST BP >= 90 MM HG: CPT | Performed by: ALLERGY & IMMUNOLOGY

## 2024-01-15 PROCEDURE — 3077F SYST BP >= 140 MM HG: CPT | Performed by: ALLERGY & IMMUNOLOGY

## 2024-01-15 PROCEDURE — 99214 OFFICE O/P EST MOD 30 MIN: CPT | Performed by: ALLERGY & IMMUNOLOGY

## 2024-01-15 PROCEDURE — 1126F AMNT PAIN NOTED NONE PRSNT: CPT | Performed by: ALLERGY & IMMUNOLOGY

## 2024-01-15 PROCEDURE — 1036F TOBACCO NON-USER: CPT | Performed by: ALLERGY & IMMUNOLOGY

## 2024-01-15 RX ORDER — LEVOCETIRIZINE DIHYDROCHLORIDE 5 MG/1
5 TABLET, FILM COATED ORAL EVERY EVENING
Qty: 30 TABLET | Refills: 11 | Status: SHIPPED | OUTPATIENT
Start: 2024-01-15

## 2024-01-15 RX ORDER — MOMETASONE FUROATE AND FORMOTEROL FUMARATE DIHYDRATE 200; 5 UG/1; UG/1
2 AEROSOL RESPIRATORY (INHALATION) 2 TIMES DAILY
Qty: 13 G | Refills: 11 | Status: SHIPPED | OUTPATIENT
Start: 2024-01-15

## 2024-01-15 RX ORDER — ALBUTEROL SULFATE 90 UG/1
2 AEROSOL, METERED RESPIRATORY (INHALATION) EVERY 4 HOURS PRN
Qty: 18 G | Refills: 5 | Status: SHIPPED | OUTPATIENT
Start: 2024-01-15

## 2024-01-15 RX ORDER — OMEPRAZOLE 20 MG/1
20 CAPSULE, DELAYED RELEASE ORAL
Qty: 30 CAPSULE | Refills: 11 | Status: SHIPPED | OUTPATIENT
Start: 2024-01-15 | End: 2024-01-16 | Stop reason: SDUPTHER

## 2024-01-15 NOTE — PROGRESS NOTES
"Subjective   Patient ID:   [id number]   Missy Patton is a 68 y.o. female who presents for Allergies (Fuv ).    Chief Complaint   Patient presents with    Allergies     Fuv        HPI  This patient is here to evaluate for:    Has been doing well  Although, she noticed exposure to dust from work done at home has triggered some asthma - she needed her inhaler  Helps a lot with getting the mucus up.     On    Renew: Albuterol Sulfate  (90 Base) MCG/ACT Inhalation Aerosol Solution  (ProAir HFA); USE 2 PUFFS EVERY 4-6 HOURS AS NEEDED FOR SHORTNESS OF  BREATH AND WHEEZING   · Renew: Dulera 200-5 MCG/ACT Inhalation Aerosol; INHALE 2 PUFFS Twice daily RINSE  MOUTH AFTERWARDS. USE WITH SPACER  GERD without esophagitis, Vocal cord dysfunction    · Renew: Omeprazole 20 MG Oral Capsule Delayed Release; TAKE ONE CAPSULE BY  MOUTH EVERY EVENING 30 MINUTES BEFORE DINNER ON AN EMPTY STOMACH  Rhinitis    · Start: Levocetirizine Dihydrochloride 5 MG Oral Tablet; TAKE 1 TABLET PO DAILY IN  THE EVENING    Review of Systems   All other systems reviewed and are negative.        Objective     BP (!) 167/93 (BP Location: Right arm, Patient Position: Sitting)   Pulse 73   Ht 1.473 m (4' 10\")   Wt 66.3 kg (146 lb 3.2 oz)   SpO2 95%   BMI 30.56 kg/m²      Physical Exam  Vitals and nursing note reviewed.   Constitutional:       Appearance: Normal appearance. She is normal weight.   HENT:      Head: Normocephalic and atraumatic.      Right Ear: External ear normal.      Left Ear: External ear normal.      Nose: No septal deviation, congestion or rhinorrhea.      Right Turbinates: Not enlarged, swollen or pale.      Left Turbinates: Not enlarged, swollen or pale.      Mouth/Throat:      Mouth: Mucous membranes are moist.   Eyes:      Extraocular Movements: Extraocular movements intact.      Conjunctiva/sclera: Conjunctivae normal.      Pupils: Pupils are equal, round, and reactive to light.   Cardiovascular:      Rate and " Rhythm: Normal rate and regular rhythm.      Pulses: Normal pulses.      Heart sounds: Normal heart sounds.   Pulmonary:      Effort: Pulmonary effort is normal.      Breath sounds: Normal breath sounds. No wheezing, rhonchi or rales.   Musculoskeletal:         General: Normal range of motion.   Skin:     General: Skin is warm and dry.      Findings: No erythema or rash.   Neurological:      General: No focal deficit present.      Mental Status: She is alert and oriented to person, place, and time.   Psychiatric:         Mood and Affect: Mood normal.         Behavior: Behavior normal.            Current Outpatient Medications   Medication Sig Dispense Refill    albuterol 90 mcg/actuation inhaler Inhale 2 puffs. Every 4 to 6 hours as needed for shortness of breath and wheezng      amphetamine-dextroamphetamine XR (Adderall XR) 30 mg 24 hr capsule Take 1 capsule (30 mg) by mouth once daily.      azithromycin (Zithromax) 250 mg tablet Take two tablets the first day, Then once per day for 5 days after. 6 tablet 0    cholecalciferol (Vitamin D-3) 125 MCG (5000 UT) capsule Take 1 capsule (125 mcg) by mouth once daily.      ciclopirox (Loprox) 0.77 % gel Apply 1 Application topically 2 times a day.      ciclopirox (Loprox) 0.77 % gel Apply topically once daily. Apply and gently massage into affected area(s) 45 g 5    denosumab (Prolia) 60 mg/mL syringe INJECT 1 ML (60 MG) UNDER THE SKIN EVERY 6 MONTHS. 1 mL 1    lamoTRIgine (LaMICtal) 200 mg tablet Take 1 tablet (200 mg) by mouth once daily.      levocetirizine (Xyzal) 5 mg tablet Take 1 tablet (5 mg) by mouth once daily in the evening.      lipase-protease-amylase 9,000-112,500- 112,500 unit capsule Take by mouth.      lisdexamfetamine (Vyvanse) 60 mg capsule Take 1 capsule (60 mg) by mouth once daily in the morning.      lisdexamfetamine (Vyvanse) 60 mg capsule Take 1 capsule (60 mg) by mouth once daily in the morning. Take before meals.      lisdexamfetamine (Vyvanse)  60 mg capsule Take 1 capsule (60 mg) by mouth once daily in the morning. Do not start before January 5, 2024. 30 capsule 0    [START ON 2/4/2024] lisdexamfetamine (Vyvanse) 60 mg capsule Take 1 capsule (60 mg) by mouth once daily in the morning. Do not start before February 4, 2024. 30 capsule 0    lisdexamfetamine dimesylate (LISDEXAMFETAMINE ORAL) Take 1 capsule by mouth once daily in the morning. Take before meals.      lisdexamfetamine dimesylate (LISDEXAMFETAMINE ORAL)       lithium ER (Lithobid) 300 mg 12 hr tablet Take 1 tablet (300 mg) by mouth once daily.      methylPREDNISolone (Medrol Dospak) 4 mg tablets FOLLOW PACKAGE DIRECTIONS      metoprolol succinate XL (Toprol-XL) 25 mg 24 hr tablet Take 0.5 tablets (12.5 mg) by mouth once daily. 90 tablet 1    metroNIDAZOLE (Flagyl) 250 mg tablet Take by mouth.      mirtazapine (Remeron) 7.5 mg tablet Take 1 tablet (7.5 mg) by mouth once daily at bedtime.      mometasone-formoterol (Dulera) 100-5 mcg/actuation inhaler Inhale 2 puffs. At 12 hour intervals (morning and evening)      mometasone-formoterol (Dulera) 200-5 mcg/actuation inhaler Inhale 2 puffs 2 times a day. Rinse mouth afterwards. Use with spacer      omeprazole (PriLOSEC) 20 mg DR capsule Take 1 capsule (20 mg) by mouth. Every evening 30 minutes before dinner on an empty stomach      traMADol (Ultram) 50 mg tablet Take 1 tablet (50 mg) by mouth every 6 hours if needed for pain.      lisdexamfetamine (Vyvanse) 60 mg capsule Take 1 capsule (60 mg) by mouth once daily in the morning. 30 capsule 0     Current Facility-Administered Medications   Medication Dose Route Frequency Provider Last Rate Last Admin    ciclopirox (Penlac) 8 % solution   Topical Once Stephen Myers MD        denosumab (Prolia) injection 60 mg  60 mg subcutaneous q6 months Sydnee Walker MD   60 mg at 12/28/23 1130       Summary of the labs over the past 6 months:    Lab on 12/28/2023   Component Date Value Ref Range Status     Lithium 12/28/2023 0.61  0.60 - 1.20 mmol/L Final    Thyroxine 12/28/2023 9.7  4.5 - 11.1 ug/dL Final    Thyroid Stimulating Hormone 12/28/2023 1.25  0.44 - 3.98 mIU/L Final    Glucose 12/28/2023 92  74 - 99 mg/dL Final    Sodium 12/28/2023 141  136 - 145 mmol/L Final    Potassium 12/28/2023 4.5  3.5 - 5.3 mmol/L Final    Chloride 12/28/2023 106  98 - 107 mmol/L Final    Bicarbonate 12/28/2023 28  21 - 32 mmol/L Final    Anion Gap 12/28/2023 12  10 - 20 mmol/L Final    Urea Nitrogen 12/28/2023 14  6 - 23 mg/dL Final    Creatinine 12/28/2023 1.03  0.50 - 1.05 mg/dL Final    eGFR 12/28/2023 60 (L)  >60 mL/min/1.73m*2 Final    Calcium 12/28/2023 9.3  8.6 - 10.6 mg/dL Final    Albumin 12/28/2023 4.4  3.4 - 5.0 g/dL Final    Alkaline Phosphatase 12/28/2023 89  33 - 136 U/L Final    Total Protein 12/28/2023 6.5  6.4 - 8.2 g/dL Final    AST 12/28/2023 21  9 - 39 U/L Final    Bilirubin, Total 12/28/2023 0.4  0.0 - 1.2 mg/dL Final    ALT 12/28/2023 18  7 - 45 U/L Final         Assessment/Plan   Diagnoses and all orders for this visit:  Allergic rhinitis due to mold  -     albuterol 90 mcg/actuation inhaler; Inhale 2 puffs every 4 hours if needed for wheezing or shortness of breath. Every 4 to 6 hours as needed for shortness of breath and wheezng  -     mometasone-formoterol (Dulera) 200-5 mcg/actuation inhaler; Inhale 2 puffs 2 times a day. Rinse mouth afterwards. Use with spacer  -     omeprazole (PriLOSEC) 20 mg DR capsule; Take 1 capsule (20 mg) by mouth once daily in the morning. Take before meals. Every evening 30 minutes before dinner on an empty stomach  -     levocetirizine (Xyzal) 5 mg tablet; Take 1 tablet (5 mg) by mouth once daily in the evening.  Other chronic sinusitis  -     albuterol 90 mcg/actuation inhaler; Inhale 2 puffs every 4 hours if needed for wheezing or shortness of breath. Every 4 to 6 hours as needed for shortness of breath and wheezng  -     mometasone-formoterol (Dulera) 200-5 mcg/actuation  inhaler; Inhale 2 puffs 2 times a day. Rinse mouth afterwards. Use with spacer  -     omeprazole (PriLOSEC) 20 mg DR capsule; Take 1 capsule (20 mg) by mouth once daily in the morning. Take before meals. Every evening 30 minutes before dinner on an empty stomach  -     levocetirizine (Xyzal) 5 mg tablet; Take 1 tablet (5 mg) by mouth once daily in the evening.  Moderate persistent asthma without complication  -     albuterol 90 mcg/actuation inhaler; Inhale 2 puffs every 4 hours if needed for wheezing or shortness of breath. Every 4 to 6 hours as needed for shortness of breath and wheezng  -     mometasone-formoterol (Dulera) 200-5 mcg/actuation inhaler; Inhale 2 puffs 2 times a day. Rinse mouth afterwards. Use with spacer  -     omeprazole (PriLOSEC) 20 mg DR capsule; Take 1 capsule (20 mg) by mouth once daily in the morning. Take before meals. Every evening 30 minutes before dinner on an empty stomach  -     levocetirizine (Xyzal) 5 mg tablet; Take 1 tablet (5 mg) by mouth once daily in the evening.      Glad that you are doing well.   Follow-up in 1 month so we may re-assess you and develop a more long term plan.     Sebastián Johnson MD

## 2024-01-23 RX ORDER — OMEPRAZOLE 20 MG/1
20 CAPSULE, DELAYED RELEASE ORAL
Qty: 90 CAPSULE | Refills: 3 | Status: SHIPPED | OUTPATIENT
Start: 2024-01-23

## 2024-01-30 ENCOUNTER — TELEPHONE (OUTPATIENT)
Dept: OTHER | Age: 69
End: 2024-01-30
Payer: MEDICARE

## 2024-01-30 DIAGNOSIS — F90.2 ATTENTION DEFICIT HYPERACTIVITY DISORDER (ADHD), COMBINED TYPE: ICD-10-CM

## 2024-01-30 RX ORDER — LISDEXAMFETAMINE DIMESYLATE 60 MG/1
60 CAPSULE ORAL EVERY MORNING
Qty: 30 CAPSULE | Refills: 0 | Status: SHIPPED | OUTPATIENT
Start: 2024-01-30 | End: 2024-01-31 | Stop reason: WASHOUT

## 2024-01-30 NOTE — TELEPHONE ENCOUNTER
Caller: pt, would like her next refill of medication sent to pharmacy in [place].  Wants to know now if this isn't an option.  She will be vacationing there soon.    Medication:  Lisdexamfetamine 60mg    [facility]:  [facility]     Next visit: 3.6.24

## 2024-01-31 ENCOUNTER — OFFICE VISIT (OUTPATIENT)
Dept: PRIMARY CARE | Facility: CLINIC | Age: 69
End: 2024-01-31
Payer: MEDICARE

## 2024-01-31 VITALS
BODY MASS INDEX: 30.64 KG/M2 | TEMPERATURE: 98.6 F | RESPIRATION RATE: 16 BRPM | WEIGHT: 146 LBS | OXYGEN SATURATION: 98 % | HEART RATE: 75 BPM | DIASTOLIC BLOOD PRESSURE: 82 MMHG | HEIGHT: 58 IN | SYSTOLIC BLOOD PRESSURE: 130 MMHG

## 2024-01-31 DIAGNOSIS — R05.9 COUGH, UNSPECIFIED TYPE: Primary | ICD-10-CM

## 2024-01-31 PROCEDURE — 1159F MED LIST DOCD IN RCRD: CPT | Performed by: INTERNAL MEDICINE

## 2024-01-31 PROCEDURE — 99213 OFFICE O/P EST LOW 20 MIN: CPT | Performed by: INTERNAL MEDICINE

## 2024-01-31 PROCEDURE — 3079F DIAST BP 80-89 MM HG: CPT | Performed by: INTERNAL MEDICINE

## 2024-01-31 PROCEDURE — 1036F TOBACCO NON-USER: CPT | Performed by: INTERNAL MEDICINE

## 2024-01-31 PROCEDURE — 3075F SYST BP GE 130 - 139MM HG: CPT | Performed by: INTERNAL MEDICINE

## 2024-01-31 PROCEDURE — 1126F AMNT PAIN NOTED NONE PRSNT: CPT | Performed by: INTERNAL MEDICINE

## 2024-01-31 RX ORDER — AZITHROMYCIN 250 MG/1
TABLET, FILM COATED ORAL
Qty: 6 TABLET | Refills: 0 | Status: SHIPPED | OUTPATIENT
Start: 2024-01-31 | End: 2024-02-05

## 2024-02-08 ENCOUNTER — TELEPHONE (OUTPATIENT)
Dept: PRIMARY CARE | Facility: CLINIC | Age: 69
End: 2024-02-08
Payer: MEDICARE

## 2024-02-08 DIAGNOSIS — R05.8 OTHER COUGH: Primary | ICD-10-CM

## 2024-02-09 RX ORDER — DOXYCYCLINE 100 MG/1
100 CAPSULE ORAL 2 TIMES DAILY
Qty: 28 CAPSULE | Refills: 0 | Status: SHIPPED | OUTPATIENT
Start: 2024-02-09 | End: 2024-02-23

## 2024-02-09 NOTE — TELEPHONE ENCOUNTER
Called patient no answer, left v/m that [facility] sent in medication hopefully she can switch it to a [facility] near her call back with any questions or concerns, AM

## 2024-02-20 ENCOUNTER — TELEMEDICINE (OUTPATIENT)
Dept: PRIMARY CARE | Facility: CLINIC | Age: 69
End: 2024-02-20
Payer: MEDICARE

## 2024-02-20 DIAGNOSIS — J32.9 OTHER SINUSITIS, UNSPECIFIED CHRONICITY: Primary | ICD-10-CM

## 2024-02-20 PROCEDURE — 1126F AMNT PAIN NOTED NONE PRSNT: CPT | Performed by: INTERNAL MEDICINE

## 2024-02-20 PROCEDURE — 1036F TOBACCO NON-USER: CPT | Performed by: INTERNAL MEDICINE

## 2024-02-20 PROCEDURE — 99213 OFFICE O/P EST LOW 20 MIN: CPT | Performed by: INTERNAL MEDICINE

## 2024-02-20 PROCEDURE — 1159F MED LIST DOCD IN RCRD: CPT | Performed by: INTERNAL MEDICINE

## 2024-02-21 NOTE — PROGRESS NOTES
Patient in for sinus congestion, thin clear secretions, occasional cough.Denies fever, chills, night sweats, or myalgias.  Symptoms are not responding to over the counter preparations. Subjective   Patient ID: Missy Patton is a 68 y.o. female who presents for No chief complaint on file..  In for follow up for sinus congestion.         Review of Systems   All other systems reviewed and are negative.      Objective   Physical Exam  There were no vitals taken for this visit.        Assessment/Plan   Problem List Items Addressed This Visit       Sinusitis - Primary

## 2024-03-06 ENCOUNTER — TELEMEDICINE (OUTPATIENT)
Dept: BEHAVIORAL HEALTH | Facility: CLINIC | Age: 69
End: 2024-03-06
Payer: MEDICARE

## 2024-03-06 DIAGNOSIS — F31.81 BIPOLAR II DISORDER (MULTI): ICD-10-CM

## 2024-03-06 DIAGNOSIS — F90.2 ATTENTION DEFICIT HYPERACTIVITY DISORDER (ADHD), COMBINED TYPE: ICD-10-CM

## 2024-03-06 PROCEDURE — 99213 OFFICE O/P EST LOW 20 MIN: CPT | Performed by: PSYCHIATRY & NEUROLOGY

## 2024-03-06 PROCEDURE — 1126F AMNT PAIN NOTED NONE PRSNT: CPT | Performed by: PSYCHIATRY & NEUROLOGY

## 2024-03-06 PROCEDURE — 1036F TOBACCO NON-USER: CPT | Performed by: PSYCHIATRY & NEUROLOGY

## 2024-03-06 PROCEDURE — 1159F MED LIST DOCD IN RCRD: CPT | Performed by: PSYCHIATRY & NEUROLOGY

## 2024-03-06 RX ORDER — LISDEXAMFETAMINE DIMESYLATE 60 MG/1
60 CAPSULE ORAL EVERY MORNING
Qty: 30 CAPSULE | Refills: 0 | Status: SHIPPED | OUTPATIENT
Start: 2024-03-06 | End: 2024-04-18 | Stop reason: SDUPTHER

## 2024-03-06 ASSESSMENT — ENCOUNTER SYMPTOMS: PSYCHIATRIC NEGATIVE: 1

## 2024-03-06 NOTE — ASSESSMENT & PLAN NOTE
Lithium blood work due in June.  Watch GFR and creatinine though lithium augmentation has been extremely helpful could be start of mild renal failure.  Follow-up Cristina

## 2024-03-06 NOTE — PROGRESS NOTES
Subjective   Patient ID: Missy Patton is a 68 y.o. female who presents for medication management visit.  HPI patient is doing well is going to [place] back-and-forth to be with her mom.  Will no longer be seeing the narcissistic male because she is sore on her grandson son's life.  Setting better boundaries with people but does not know how to meet somebody here in [place].    Review of Systems   Psychiatric/Behavioral: Negative.         Objective   Physical Exam  Psychiatric:         Attention and Perception: Attention and perception normal.         Mood and Affect: Mood and affect normal.         Speech: Speech normal.         Behavior: Behavior normal. Behavior is cooperative.         Thought Content: Thought content normal.         Cognition and Memory: Cognition and memory normal.      Comments: Bright affect normal mood.  Thoughts organized.  Lithium blood work is due in June.  Watch GFR and creatinine         Assessment/Plan   Problem List Items Addressed This Visit             ICD-10-CM    ADHD (attention deficit hyperactivity disorder) F90.9    Relevant Medications    Vyvanse 60 mg capsule    Bipolar II disorder (CMS/[facility]) F31.81     Lithium blood work due in June.  Watch GFR and creatinine though lithium augmentation has been extremely helpful could be start of mild renal failure.  Follow-up Cristina Serrano MD 03/06/24 4:54 PM

## 2024-03-08 NOTE — PROGRESS NOTES
Subjective   Patient ID: Missy Patton is a 68 y.o. female who presents for No chief complaint on file..  In for sinus congestion        Review of Systems   All other systems reviewed and are negative.      Objective   Physical Exam  There were no vitals taken for this visit.        Assessment/Plan   Problem List Items Addressed This Visit       Sinusitis - Primary     Stable will follow.

## 2024-03-11 ENCOUNTER — TELEPHONE (OUTPATIENT)
Dept: PRIMARY CARE | Facility: CLINIC | Age: 69
End: 2024-03-11
Payer: MEDICARE

## 2024-03-11 NOTE — TELEPHONE ENCOUNTER
Onset 3/6 clear nasal drainage, progressed to green & thick.  No congestion.   Req Rx.  Leaving town this week.  [facility]

## 2024-03-18 ENCOUNTER — OFFICE VISIT (OUTPATIENT)
Dept: ALLERGY | Facility: CLINIC | Age: 69
End: 2024-03-18
Payer: MEDICARE

## 2024-03-18 VITALS
HEIGHT: 60 IN | WEIGHT: 147.8 LBS | SYSTOLIC BLOOD PRESSURE: 158 MMHG | HEART RATE: 70 BPM | BODY MASS INDEX: 29.02 KG/M2 | DIASTOLIC BLOOD PRESSURE: 92 MMHG

## 2024-03-18 DIAGNOSIS — J30.89 ALLERGIC RHINITIS DUE TO MOLD: ICD-10-CM

## 2024-03-18 DIAGNOSIS — J45.40 MODERATE PERSISTENT ASTHMA WITHOUT COMPLICATION (HHS-HCC): Primary | ICD-10-CM

## 2024-03-18 DIAGNOSIS — J32.8 OTHER CHRONIC SINUSITIS: ICD-10-CM

## 2024-03-18 PROCEDURE — 1036F TOBACCO NON-USER: CPT | Performed by: ALLERGY & IMMUNOLOGY

## 2024-03-18 PROCEDURE — 3080F DIAST BP >= 90 MM HG: CPT | Performed by: ALLERGY & IMMUNOLOGY

## 2024-03-18 PROCEDURE — 1159F MED LIST DOCD IN RCRD: CPT | Performed by: ALLERGY & IMMUNOLOGY

## 2024-03-18 PROCEDURE — 99214 OFFICE O/P EST MOD 30 MIN: CPT | Performed by: ALLERGY & IMMUNOLOGY

## 2024-03-18 PROCEDURE — 3077F SYST BP >= 140 MM HG: CPT | Performed by: ALLERGY & IMMUNOLOGY

## 2024-03-18 RX ORDER — ALBUTEROL SULFATE 0.83 MG/ML
2.5 SOLUTION RESPIRATORY (INHALATION) EVERY 4 HOURS PRN
Qty: 75 ML | Refills: 5 | Status: SHIPPED | OUTPATIENT
Start: 2024-03-18 | End: 2025-03-18

## 2024-03-18 NOTE — PROGRESS NOTES
"Subjective   Patient ID:   [id number]   Missy Patton is a 68 y.o. female who presents for Allergies.    Chief Complaint   Patient presents with    Allergies          HPI  This patient is here to evaluate for:    Getting over sinusitis on doxycycline right now.     Coughing up thick sinus mucus.   Cxr and ct scan. They saw \"granular\" and sent her to follow-up with Pulmonary but was able to discuss the issue with her PCP.     Given augmentin.    4 times this Winter has had infections .  In [place], she left from [place] being sick. Was on zpack. No help.     Then it was changed to doxy. Still no better.   Went to urgent care and got oral steroid, cough medicine.   Has been 6 weeks.     Still hacking coughing.   She got a CXR and no concerns, per pt.     The breathing issue has finally improved but got enough sinusitis so on doxy again.     SH: She is around her grandson, aged 2 yo, who's in  and getting sick often.       Review of Systems   All other systems reviewed and are negative.        Objective     BP (!) 158/92   Pulse 70   Ht 1.511 m (4' 11.5\")   Wt 67 kg (147 lb 12.8 oz)   BMI 29.35 kg/m²      Physical Exam  Constitutional:       General: She is not in acute distress.     Appearance: Normal appearance. She is not ill-appearing.   HENT:      Head: Normocephalic and atraumatic.      Right Ear: Tympanic membrane, ear canal and external ear normal.      Left Ear: Tympanic membrane, ear canal and external ear normal.      Nose: Nose normal. No congestion or rhinorrhea.      Mouth/Throat:      Mouth: Mucous membranes are moist.      Pharynx: Oropharynx is clear. No oropharyngeal exudate or posterior oropharyngeal erythema.   Eyes:      General:         Right eye: No discharge.         Left eye: No discharge.      Conjunctiva/sclera: Conjunctivae normal.   Cardiovascular:      Rate and Rhythm: Normal rate and regular rhythm.      Heart sounds: Normal heart sounds. No murmur heard.     No friction rub. " No gallop.   Pulmonary:      Effort: Pulmonary effort is normal. No respiratory distress.      Breath sounds: Normal breath sounds. No stridor. No wheezing, rhonchi or rales.   Chest:      Chest wall: No tenderness.   Abdominal:      General: Abdomen is flat.      Palpations: Abdomen is soft.   Musculoskeletal:         General: Normal range of motion.      Cervical back: Normal range of motion and neck supple.   Lymphadenopathy:      Cervical: No cervical adenopathy.   Skin:     General: Skin is warm and dry.      Findings: No erythema, lesion or rash.   Neurological:      General: No focal deficit present.      Mental Status: She is alert. Mental status is at baseline.   Psychiatric:         Mood and Affect: Mood normal.         Behavior: Behavior normal.         Thought Content: Thought content normal.         Judgment: Judgment normal.            Current Outpatient Medications   Medication Sig Dispense Refill    albuterol 90 mcg/actuation inhaler Inhale 2 puffs every 4 hours if needed for wheezing or shortness of breath. Every 4 to 6 hours as needed for shortness of breath and wheezng 18 g 5    azithromycin (Zithromax) 250 mg tablet Take two tablets the first day, Then once per day for 5 days after. 6 tablet 0    cholecalciferol (Vitamin D-3) 125 MCG (5000 UT) capsule Take 1 capsule (125 mcg) by mouth once daily.      ciclopirox (Loprox) 0.77 % gel Apply 1 Application topically 2 times a day.      ciclopirox (Loprox) 0.77 % gel Apply topically once daily. Apply and gently massage into affected area(s) 45 g 5    denosumab (Prolia) 60 mg/mL syringe INJECT 1 ML (60 MG) UNDER THE SKIN EVERY 6 MONTHS. 1 mL 1    lamoTRIgine (LaMICtal) 200 mg tablet Take 1 tablet (200 mg) by mouth once daily.      levocetirizine (Xyzal) 5 mg tablet Take 1 tablet (5 mg) by mouth once daily in the evening. 30 tablet 11    lipase-protease-amylase 9,000-112,500- 112,500 unit capsule Take by mouth.      lisdexamfetamine (Vyvanse) 60 mg  capsule Take 1 capsule (60 mg) by mouth once daily in the morning.      lisdexamfetamine (Vyvanse) 60 mg capsule Take 1 capsule (60 mg) by mouth once daily in the morning. Take before meals.      lithium ER (Lithobid) 300 mg 12 hr tablet Take 1 tablet (300 mg) by mouth once daily.      metoprolol succinate XL (Toprol-XL) 25 mg 24 hr tablet Take 0.5 tablets (12.5 mg) by mouth once daily. 90 tablet 1    mirtazapine (Remeron) 7.5 mg tablet Take 1 tablet (7.5 mg) by mouth once daily at bedtime.      mometasone-formoterol (Dulera) 200-5 mcg/actuation inhaler Inhale 2 puffs 2 times a day. Rinse mouth afterwards. Use with spacer 13 g 11    omeprazole (PriLOSEC) 20 mg DR capsule Take 1 capsule (20 mg) by mouth once daily in the morning. Take before meals. Every evening 30 minutes before dinner on an empty stomach 90 capsule 3    Vyvanse 60 mg capsule Take 1 capsule (60 mg) by mouth once daily in the morning. 30 capsule 0     Current Facility-Administered Medications   Medication Dose Route Frequency Provider Last Rate Last Admin    ciclopirox (Penlac) 8 % solution   Topical Once Stephen Myers MD        denosumab (Prolia) injection 60 mg  60 mg subcutaneous q6 months Sydnee Walker MD   60 mg at 12/28/23 1130       Summary of the labs over the past 6 months:    Lab on 12/28/2023   Component Date Value Ref Range Status    Lithium 12/28/2023 0.61  0.60 - 1.20 mmol/L Final    Thyroxine 12/28/2023 9.7  4.5 - 11.1 ug/dL Final    Thyroid Stimulating Hormone 12/28/2023 1.25  0.44 - 3.98 mIU/L Final    Glucose 12/28/2023 92  74 - 99 mg/dL Final    Sodium 12/28/2023 141  136 - 145 mmol/L Final    Potassium 12/28/2023 4.5  3.5 - 5.3 mmol/L Final    Chloride 12/28/2023 106  98 - 107 mmol/L Final    Bicarbonate 12/28/2023 28  21 - 32 mmol/L Final    Anion Gap 12/28/2023 12  10 - 20 mmol/L Final    Urea Nitrogen 12/28/2023 14  6 - 23 mg/dL Final    Creatinine 12/28/2023 1.03  0.50 - 1.05 mg/dL Final    eGFR 12/28/2023 60 (L)  >60  mL/min/1.73m*2 Final    Calcium 12/28/2023 9.3  8.6 - 10.6 mg/dL Final    Albumin 12/28/2023 4.4  3.4 - 5.0 g/dL Final    Alkaline Phosphatase 12/28/2023 89  33 - 136 U/L Final    Total Protein 12/28/2023 6.5  6.4 - 8.2 g/dL Final    AST 12/28/2023 21  9 - 39 U/L Final    Bilirubin, Total 12/28/2023 0.4  0.0 - 1.2 mg/dL Final    ALT 12/28/2023 18  7 - 45 U/L Final         Assessment/Plan   Diagnoses and all orders for this visit:  Moderate persistent asthma without complication  Other chronic sinusitis  Allergic rhinitis due to mold      We discussed the differences between albuterol via nebulizer and HFA/MDI.    We can send in a nebulizer for the albuterol to use as needed, to [facility].     Med refills done last visit 1/24.       Sebastián Johnson MD

## 2024-04-18 DIAGNOSIS — F90.2 ATTENTION DEFICIT HYPERACTIVITY DISORDER (ADHD), COMBINED TYPE: ICD-10-CM

## 2024-04-18 RX ORDER — LISDEXAMFETAMINE DIMESYLATE 60 MG/1
60 CAPSULE ORAL EVERY MORNING
Qty: 30 CAPSULE | Refills: 0 | Status: SHIPPED | OUTPATIENT
Start: 2024-04-18 | End: 2024-05-23 | Stop reason: SDUPTHER

## 2024-04-25 ENCOUNTER — TELEPHONE (OUTPATIENT)
Dept: BEHAVIORAL HEALTH | Facility: CLINIC | Age: 69
End: 2024-04-25
Payer: MEDICARE

## 2024-04-25 DIAGNOSIS — F31.81 BIPOLAR II DISORDER (MULTI): ICD-10-CM

## 2024-04-25 RX ORDER — MIRTAZAPINE 7.5 MG/1
7.5 TABLET, FILM COATED ORAL NIGHTLY
Qty: 30 TABLET | Refills: 11 | Status: SHIPPED | OUTPATIENT
Start: 2024-04-25 | End: 2025-04-25

## 2024-04-25 RX ORDER — LAMOTRIGINE 200 MG/1
200 TABLET ORAL DAILY
Qty: 30 TABLET | Refills: 11 | Status: SHIPPED | OUTPATIENT
Start: 2024-04-25 | End: 2025-04-25

## 2024-04-30 DIAGNOSIS — Z12.31 SCREENING MAMMOGRAM FOR BREAST CANCER: ICD-10-CM

## 2024-05-01 RX ORDER — METRONIDAZOLE 250 MG/1
250 TABLET ORAL 3 TIMES DAILY
COMMUNITY
Start: 2024-04-21 | End: 2024-05-23 | Stop reason: WASHOUT

## 2024-05-02 ENCOUNTER — TELEMEDICINE (OUTPATIENT)
Dept: PRIMARY CARE | Facility: CLINIC | Age: 69
End: 2024-05-02
Payer: MEDICARE

## 2024-05-02 DIAGNOSIS — E55.9 VITAMIN D DEFICIENCY: ICD-10-CM

## 2024-05-02 DIAGNOSIS — R05.1 ACUTE COUGH: Primary | ICD-10-CM

## 2024-05-02 DIAGNOSIS — Z00.00 ENCOUNTER FOR MEDICARE ANNUAL WELLNESS EXAM: Primary | ICD-10-CM

## 2024-05-02 DIAGNOSIS — R79.9 ABNORMAL FINDING OF BLOOD CHEMISTRY, UNSPECIFIED: ICD-10-CM

## 2024-05-02 PROCEDURE — 1159F MED LIST DOCD IN RCRD: CPT | Performed by: INTERNAL MEDICINE

## 2024-05-02 PROCEDURE — 99213 OFFICE O/P EST LOW 20 MIN: CPT | Performed by: INTERNAL MEDICINE

## 2024-05-08 ENCOUNTER — TELEPHONE (OUTPATIENT)
Dept: OTHER | Age: 69
End: 2024-05-08
Payer: MEDICARE

## 2024-05-14 ENCOUNTER — DOCUMENTATION (OUTPATIENT)
Dept: PHYSICAL THERAPY | Facility: CLINIC | Age: 69
End: 2024-05-14
Payer: MEDICARE

## 2024-05-14 NOTE — PROGRESS NOTES
Physical Therapy    Discharge Summary    Name: Missy Patton  MRN: [id number]  : 1955  Date: 24    Discharge Summary: PT    Discharge Information: Date of discharge 2024    Therapy Summary: Patient seen for one follow up visit Hep started    Discharge Status: Unknown     Rehab Discharge Reason: Other Patient did not schedule further visit

## 2024-05-20 ENCOUNTER — LAB (OUTPATIENT)
Dept: LAB | Facility: LAB | Age: 69
End: 2024-05-20
Payer: MEDICARE

## 2024-05-20 ENCOUNTER — TELEPHONE (OUTPATIENT)
Dept: PRIMARY CARE | Facility: CLINIC | Age: 69
End: 2024-05-20

## 2024-05-20 ENCOUNTER — TELEPHONE (OUTPATIENT)
Dept: OTHER | Age: 69
End: 2024-05-20

## 2024-05-20 DIAGNOSIS — R05.9 COUGH, UNSPECIFIED TYPE: ICD-10-CM

## 2024-05-20 DIAGNOSIS — E55.9 VITAMIN D DEFICIENCY: ICD-10-CM

## 2024-05-20 DIAGNOSIS — R79.9 ABNORMAL FINDING OF BLOOD CHEMISTRY, UNSPECIFIED: ICD-10-CM

## 2024-05-20 DIAGNOSIS — Z00.00 ENCOUNTER FOR MEDICARE ANNUAL WELLNESS EXAM: ICD-10-CM

## 2024-05-20 LAB
25(OH)D3 SERPL-MCNC: 29 NG/ML (ref 30–100)
ALT SERPL W P-5'-P-CCNC: 21 U/L (ref 7–45)
ANION GAP SERPL CALC-SCNC: 13 MMOL/L (ref 10–20)
APPEARANCE UR: CLEAR
BILIRUB UR STRIP.AUTO-MCNC: NEGATIVE MG/DL
BUN SERPL-MCNC: 16 MG/DL (ref 6–23)
CALCIUM SERPL-MCNC: 9.4 MG/DL (ref 8.6–10.6)
CHLORIDE SERPL-SCNC: 106 MMOL/L (ref 98–107)
CHOLEST SERPL-MCNC: 242 MG/DL (ref 0–199)
CHOLESTEROL/HDL RATIO: 4.1
CO2 SERPL-SCNC: 26 MMOL/L (ref 21–32)
COLOR UR: ABNORMAL
CREAT SERPL-MCNC: 1.06 MG/DL (ref 0.5–1.05)
EGFRCR SERPLBLD CKD-EPI 2021: 58 ML/MIN/1.73M*2
ERYTHROCYTE [DISTWIDTH] IN BLOOD BY AUTOMATED COUNT: 13.2 % (ref 11.5–14.5)
EST. AVERAGE GLUCOSE BLD GHB EST-MCNC: 105 MG/DL
GLUCOSE SERPL-MCNC: 98 MG/DL (ref 74–99)
GLUCOSE UR STRIP.AUTO-MCNC: NORMAL MG/DL
HBA1C MFR BLD: 5.3 %
HCT VFR BLD AUTO: 42.2 % (ref 36–46)
HDLC SERPL-MCNC: 58.8 MG/DL
HGB BLD-MCNC: 13.5 G/DL (ref 12–16)
HOLD SPECIMEN: NORMAL
KETONES UR STRIP.AUTO-MCNC: NEGATIVE MG/DL
LDLC SERPL CALC-MCNC: 148 MG/DL
LEUKOCYTE ESTERASE UR QL STRIP.AUTO: ABNORMAL
MCH RBC QN AUTO: 29.7 PG (ref 26–34)
MCHC RBC AUTO-ENTMCNC: 32 G/DL (ref 32–36)
MCV RBC AUTO: 93 FL (ref 80–100)
NITRITE UR QL STRIP.AUTO: NEGATIVE
NON HDL CHOLESTEROL: 183 MG/DL (ref 0–149)
NRBC BLD-RTO: 0 /100 WBCS (ref 0–0)
PH UR STRIP.AUTO: 6.5 [PH]
PLATELET # BLD AUTO: 265 X10*3/UL (ref 150–450)
POTASSIUM SERPL-SCNC: 4.1 MMOL/L (ref 3.5–5.3)
PROT UR STRIP.AUTO-MCNC: NEGATIVE MG/DL
RBC # BLD AUTO: 4.55 X10*6/UL (ref 4–5.2)
RBC # UR STRIP.AUTO: NEGATIVE /UL
RBC #/AREA URNS AUTO: ABNORMAL /HPF
SODIUM SERPL-SCNC: 141 MMOL/L (ref 136–145)
SP GR UR STRIP.AUTO: 1.01
SQUAMOUS #/AREA URNS AUTO: ABNORMAL /HPF
TRIGL SERPL-MCNC: 177 MG/DL (ref 0–149)
TSH SERPL-ACNC: 1.72 MIU/L (ref 0.44–3.98)
UROBILINOGEN UR STRIP.AUTO-MCNC: NORMAL MG/DL
VIT B12 SERPL-MCNC: 317 PG/ML (ref 211–911)
VLDL: 35 MG/DL (ref 0–40)
WBC # BLD AUTO: 6.2 X10*3/UL (ref 4.4–11.3)
WBC #/AREA URNS AUTO: ABNORMAL /HPF

## 2024-05-20 PROCEDURE — 87086 URINE CULTURE/COLONY COUNT: CPT

## 2024-05-20 PROCEDURE — 80048 BASIC METABOLIC PNL TOTAL CA: CPT

## 2024-05-20 PROCEDURE — 36415 COLL VENOUS BLD VENIPUNCTURE: CPT

## 2024-05-20 PROCEDURE — 81001 URINALYSIS AUTO W/SCOPE: CPT

## 2024-05-20 PROCEDURE — 82306 VITAMIN D 25 HYDROXY: CPT

## 2024-05-20 PROCEDURE — 82607 VITAMIN B-12: CPT

## 2024-05-20 PROCEDURE — 85027 COMPLETE CBC AUTOMATED: CPT

## 2024-05-20 PROCEDURE — 84443 ASSAY THYROID STIM HORMONE: CPT

## 2024-05-20 PROCEDURE — 83036 HEMOGLOBIN GLYCOSYLATED A1C: CPT

## 2024-05-20 PROCEDURE — 80061 LIPID PANEL: CPT

## 2024-05-20 PROCEDURE — 84460 ALANINE AMINO (ALT) (SGPT): CPT

## 2024-05-20 NOTE — TELEPHONE ENCOUNTER
Pt req chest Xray.   Very sick in February in [place], had granular appearance on xrays then.  That provider advised pt to FU in a few months w Xray.

## 2024-05-21 PROBLEM — R05.1 ACUTE COUGH: Status: ACTIVE | Noted: 2024-05-21

## 2024-05-21 LAB — BACTERIA UR CULT: NO GROWTH

## 2024-05-21 NOTE — PROGRESS NOTES
Subjective   Patient ID: Missy Patton is a 68 y.o. female who presents for No chief complaint on file..  In for Uri symptoms.         Review of Systems   All other systems reviewed and are negative.      Objective   Physical Exam  There were no vitals taken for this visit.        Assessment/Plan   Problem List Items Addressed This Visit       Acute cough - Primary        Will follow Call if there are any other issues.

## 2024-05-22 ENCOUNTER — HOSPITAL ENCOUNTER (OUTPATIENT)
Dept: RADIOLOGY | Facility: CLINIC | Age: 69
Discharge: HOME | End: 2024-05-22
Payer: MEDICARE

## 2024-05-22 DIAGNOSIS — R05.9 COUGH, UNSPECIFIED TYPE: ICD-10-CM

## 2024-05-22 PROCEDURE — 71046 X-RAY EXAM CHEST 2 VIEWS: CPT

## 2024-05-22 PROCEDURE — 71046 X-RAY EXAM CHEST 2 VIEWS: CPT | Performed by: RADIOLOGY

## 2024-05-23 ENCOUNTER — OFFICE VISIT (OUTPATIENT)
Dept: PRIMARY CARE | Facility: CLINIC | Age: 69
End: 2024-05-23
Payer: MEDICARE

## 2024-05-23 ENCOUNTER — TELEPHONE (OUTPATIENT)
Dept: BEHAVIORAL HEALTH | Facility: CLINIC | Age: 69
End: 2024-05-23

## 2024-05-23 VITALS
DIASTOLIC BLOOD PRESSURE: 84 MMHG | OXYGEN SATURATION: 96 % | HEART RATE: 71 BPM | RESPIRATION RATE: 16 BRPM | BODY MASS INDEX: 28.51 KG/M2 | SYSTOLIC BLOOD PRESSURE: 152 MMHG | TEMPERATURE: 97.8 F | WEIGHT: 145.2 LBS | HEIGHT: 60 IN

## 2024-05-23 DIAGNOSIS — Z00.00 WELL ADULT EXAM: Primary | ICD-10-CM

## 2024-05-23 DIAGNOSIS — I10 PRIMARY HYPERTENSION: ICD-10-CM

## 2024-05-23 DIAGNOSIS — F90.2 ATTENTION DEFICIT HYPERACTIVITY DISORDER (ADHD), COMBINED TYPE: ICD-10-CM

## 2024-05-23 PROCEDURE — 1170F FXNL STATUS ASSESSED: CPT | Performed by: INTERNAL MEDICINE

## 2024-05-23 PROCEDURE — G0402 INITIAL PREVENTIVE EXAM: HCPCS | Performed by: INTERNAL MEDICINE

## 2024-05-23 PROCEDURE — 1159F MED LIST DOCD IN RCRD: CPT | Performed by: INTERNAL MEDICINE

## 2024-05-23 PROCEDURE — 1036F TOBACCO NON-USER: CPT | Performed by: INTERNAL MEDICINE

## 2024-05-23 PROCEDURE — 3077F SYST BP >= 140 MM HG: CPT | Performed by: INTERNAL MEDICINE

## 2024-05-23 PROCEDURE — 3079F DIAST BP 80-89 MM HG: CPT | Performed by: INTERNAL MEDICINE

## 2024-05-23 PROCEDURE — 1123F ACP DISCUSS/DSCN MKR DOCD: CPT | Performed by: INTERNAL MEDICINE

## 2024-05-23 PROCEDURE — 1126F AMNT PAIN NOTED NONE PRSNT: CPT | Performed by: INTERNAL MEDICINE

## 2024-05-23 RX ORDER — LISDEXAMFETAMINE DIMESYLATE 60 MG/1
60 CAPSULE ORAL EVERY MORNING
Qty: 30 CAPSULE | Refills: 0 | Status: SHIPPED | OUTPATIENT
Start: 2024-05-23 | End: 2024-06-22

## 2024-05-23 RX ORDER — METOPROLOL SUCCINATE 25 MG/1
12.5 TABLET, EXTENDED RELEASE ORAL DAILY
Qty: 90 TABLET | Refills: 1 | Status: SHIPPED | OUTPATIENT
Start: 2024-05-23

## 2024-05-23 ASSESSMENT — PATIENT HEALTH QUESTIONNAIRE - PHQ9
3. TROUBLE FALLING OR STAYING ASLEEP OR SLEEPING TOO MUCH: NOT AT ALL
4. FEELING TIRED OR HAVING LITTLE ENERGY: NOT AT ALL
10. IF YOU CHECKED OFF ANY PROBLEMS, HOW DIFFICULT HAVE THESE PROBLEMS MADE IT FOR YOU TO DO YOUR WORK, TAKE CARE OF THINGS AT HOME, OR GET ALONG WITH OTHER PEOPLE: NOT DIFFICULT AT ALL
2. FEELING DOWN, DEPRESSED OR HOPELESS: SEVERAL DAYS
6. FEELING BAD ABOUT YOURSELF - OR THAT YOU ARE A FAILURE OR HAVE LET YOURSELF OR YOUR FAMILY DOWN: NOT AT ALL
7. TROUBLE CONCENTRATING ON THINGS, SUCH AS READING THE NEWSPAPER OR WATCHING TELEVISION: NOT AT ALL
SUM OF ALL RESPONSES TO PHQ9 QUESTIONS 1 AND 2: 1
9. THOUGHTS THAT YOU WOULD BE BETTER OFF DEAD, OR OF HURTING YOURSELF: NOT AT ALL
5. POOR APPETITE OR OVEREATING: SEVERAL DAYS
8. MOVING OR SPEAKING SO SLOWLY THAT OTHER PEOPLE COULD HAVE NOTICED. OR THE OPPOSITE, BEING SO FIGETY OR RESTLESS THAT YOU HAVE BEEN MOVING AROUND A LOT MORE THAN USUAL: NOT AT ALL
1. LITTLE INTEREST OR PLEASURE IN DOING THINGS: NOT AT ALL
SUM OF ALL RESPONSES TO PHQ QUESTIONS 1-9: 2

## 2024-05-23 ASSESSMENT — ACTIVITIES OF DAILY LIVING (ADL)
DOING_HOUSEWORK: INDEPENDENT
BATHING: INDEPENDENT
MANAGING_FINANCES: INDEPENDENT
DRESSING: INDEPENDENT
TAKING_MEDICATION: INDEPENDENT
GROCERY_SHOPPING: INDEPENDENT

## 2024-05-23 ASSESSMENT — PAIN SCALES - GENERAL: PAINLEVEL: 0-NO PAIN

## 2024-05-23 NOTE — PROGRESS NOTES
"Subjective   Patient ID: Missy Patton is a 68 y.o. female who presents for Medicare Annual Wellness Visit Subsequent.  Patient comes in for a physical examination, doing well over - all with no particular complaints.   Also in for laboratory review and health maintenance update.  Updating family history as well.          Review of Systems   All other systems reviewed and are negative.      Objective   Physical Exam  Constitutional:       Appearance: Normal appearance.   HENT:      Head: Normocephalic.   Eyes:      Extraocular Movements: Extraocular movements intact.      Conjunctiva/sclera: Conjunctivae normal.      Pupils: Pupils are equal, round, and reactive to light.   Cardiovascular:      Rate and Rhythm: Normal rate and regular rhythm.   Pulmonary:      Effort: Pulmonary effort is normal.      Breath sounds: Normal breath sounds.   Abdominal:      General: Abdomen is flat. Bowel sounds are normal.      Palpations: Abdomen is soft.   Musculoskeletal:         General: Normal range of motion.      Cervical back: Normal range of motion.   Skin:     General: Skin is warm and dry.   Neurological:      General: No focal deficit present.      Mental Status: She is alert and oriented to person, place, and time. Mental status is at baseline.   Psychiatric:         Mood and Affect: Mood normal.         Behavior: Behavior normal.       /84   Pulse 71   Temp 36.6 °C (97.8 °F)   Resp 16   Ht 1.511 m (4' 11.5\")   Wt 65.9 kg (145 lb 3.2 oz)   SpO2 96%   BMI 28.84 kg/m²         Assessment/Plan   Problem List Items Addressed This Visit       Hypertension    Relevant Medications    metoprolol succinate XL (Toprol-XL) 25 mg 24 hr tablet    Well adult exam - Primary          "

## 2024-05-28 ENCOUNTER — APPOINTMENT (OUTPATIENT)
Dept: PRIMARY CARE | Facility: CLINIC | Age: 69
End: 2024-05-28
Payer: MEDICARE

## 2024-05-30 ENCOUNTER — OFFICE VISIT (OUTPATIENT)
Dept: OBSTETRICS AND GYNECOLOGY | Facility: CLINIC | Age: 69
End: 2024-05-30
Payer: MEDICARE

## 2024-05-30 VITALS
SYSTOLIC BLOOD PRESSURE: 153 MMHG | BODY MASS INDEX: 28.43 KG/M2 | WEIGHT: 141 LBS | HEIGHT: 59 IN | DIASTOLIC BLOOD PRESSURE: 96 MMHG

## 2024-05-30 DIAGNOSIS — N95.2 VAGINAL ATROPHY: Primary | ICD-10-CM

## 2024-05-30 DIAGNOSIS — Z01.419 NORMAL GYNECOLOGIC EXAMINATION: ICD-10-CM

## 2024-05-30 PROCEDURE — 99214 OFFICE O/P EST MOD 30 MIN: CPT | Performed by: OBSTETRICS & GYNECOLOGY

## 2024-05-30 PROCEDURE — 1123F ACP DISCUSS/DSCN MKR DOCD: CPT | Performed by: OBSTETRICS & GYNECOLOGY

## 2024-05-30 PROCEDURE — 1126F AMNT PAIN NOTED NONE PRSNT: CPT | Performed by: OBSTETRICS & GYNECOLOGY

## 2024-05-30 PROCEDURE — 1036F TOBACCO NON-USER: CPT | Performed by: OBSTETRICS & GYNECOLOGY

## 2024-05-30 PROCEDURE — 1159F MED LIST DOCD IN RCRD: CPT | Performed by: OBSTETRICS & GYNECOLOGY

## 2024-05-30 PROCEDURE — 1160F RVW MEDS BY RX/DR IN RCRD: CPT | Performed by: OBSTETRICS & GYNECOLOGY

## 2024-05-30 PROCEDURE — 3077F SYST BP >= 140 MM HG: CPT | Performed by: OBSTETRICS & GYNECOLOGY

## 2024-05-30 PROCEDURE — 3080F DIAST BP >= 90 MM HG: CPT | Performed by: OBSTETRICS & GYNECOLOGY

## 2024-05-30 RX ORDER — DOXYCYCLINE HYCLATE 100 MG
100 TABLET ORAL 2 TIMES DAILY
COMMUNITY
Start: 2024-05-28

## 2024-05-30 RX ORDER — ESTRADIOL 0.1 MG/G
1 CREAM VAGINAL EVERY OTHER DAY
Qty: 45 G | Refills: 3 | Status: SHIPPED | OUTPATIENT
Start: 2024-05-30 | End: 2025-05-30

## 2024-05-30 ASSESSMENT — PAIN SCALES - GENERAL: PAINLEVEL: 0-NO PAIN

## 2024-05-30 NOTE — PROGRESS NOTES
Subjective   Patient ID: Missy Patton is a 68 y.o. female who presents for Annual Exam (Last pap:  9/28/2022  neg/neg/Last maria e:  9/30/2022  cat 1/Last colon screen:  5/10/2018/Tjea hodges.   Otilia Driscoll LPN).  HPI patient is here for annual visit she has no complaints    Review of Systems   Constitutional: Negative.    Eyes: Negative.    Respiratory: Negative.     Cardiovascular: Negative.    Gastrointestinal: Negative.    Endocrine: Negative.    Genitourinary: Negative.    Musculoskeletal: Negative.    Skin: Negative.    Allergic/Immunologic: Negative.    Neurological: Negative.    Hematological: Negative.    Psychiatric/Behavioral: Negative.         Objective   Physical Exam  Constitutional:       Appearance: Normal appearance.   HENT:      Head: Normocephalic and atraumatic.   Cardiovascular:      Rate and Rhythm: Normal rate and regular rhythm.      Pulses: Normal pulses.      Heart sounds: Normal heart sounds.   Pulmonary:      Effort: Pulmonary effort is normal.      Breath sounds: Normal breath sounds.   Abdominal:      General: Abdomen is flat. Bowel sounds are normal.      Palpations: Abdomen is soft.      Hernia: There is no hernia in the left inguinal area or right inguinal area.   Genitourinary:     General: Normal vulva.      Exam position: Lithotomy position.      Labia:         Right: No rash, tenderness or lesion.         Left: No rash, tenderness or lesion.       Urethra: No prolapse.      Vagina: Normal.      Cervix: Normal.      Uterus: Normal.       Adnexa: Right adnexa normal and left adnexa normal.   Musculoskeletal:      Cervical back: Normal range of motion and neck supple.   Skin:     General: Skin is warm and dry.   Neurological:      General: No focal deficit present.      Mental Status: She is alert and oriented to person, place, and time.         Assessment/Plan    normal gynecologic examination  Mammogram         Deng Li MD 05/30/24 4:14 PM

## 2024-05-31 ASSESSMENT — ENCOUNTER SYMPTOMS
HEMATOLOGIC/LYMPHATIC NEGATIVE: 1
PSYCHIATRIC NEGATIVE: 1
NEUROLOGICAL NEGATIVE: 1
ALLERGIC/IMMUNOLOGIC NEGATIVE: 1
EYES NEGATIVE: 1
CONSTITUTIONAL NEGATIVE: 1
MUSCULOSKELETAL NEGATIVE: 1
GASTROINTESTINAL NEGATIVE: 1
ENDOCRINE NEGATIVE: 1
CARDIOVASCULAR NEGATIVE: 1
RESPIRATORY NEGATIVE: 1

## 2024-06-06 ENCOUNTER — TELEPHONE (OUTPATIENT)
Dept: PRIMARY CARE | Facility: CLINIC | Age: 69
End: 2024-06-06

## 2024-06-06 NOTE — TELEPHONE ENCOUNTER
"Next month, Pt will be travelling to [facility] for two weeks.  Asked what vaccines she needs or if she needs to talk to a \"travel doctor\"  "

## 2024-06-07 NOTE — TELEPHONE ENCOUNTER
Called patient no answer left v/m to call the  travel clinic [phone], for out of the country travel, AM

## 2024-06-11 ENCOUNTER — TELEPHONE (OUTPATIENT)
Dept: BEHAVIORAL HEALTH | Facility: CLINIC | Age: 69
End: 2024-06-11
Payer: MEDICARE

## 2024-06-11 DIAGNOSIS — F31.81 BIPOLAR II DISORDER (MULTI): ICD-10-CM

## 2024-06-12 RX ORDER — LITHIUM CARBONATE 300 MG/1
300 TABLET, FILM COATED, EXTENDED RELEASE ORAL DAILY
Qty: 30 TABLET | Refills: 11 | Status: SHIPPED | OUTPATIENT
Start: 2024-06-12 | End: 2025-06-12

## 2024-06-24 DIAGNOSIS — F90.2 ATTENTION DEFICIT HYPERACTIVITY DISORDER (ADHD), COMBINED TYPE: ICD-10-CM

## 2024-06-25 RX ORDER — LISDEXAMFETAMINE DIMESYLATE 60 MG/1
60 CAPSULE ORAL EVERY MORNING
Qty: 30 CAPSULE | Refills: 0 | Status: SHIPPED | OUTPATIENT
Start: 2024-06-25 | End: 2024-07-25

## 2024-07-01 ENCOUNTER — APPOINTMENT (OUTPATIENT)
Dept: BEHAVIORAL HEALTH | Facility: CLINIC | Age: 69
End: 2024-07-01
Payer: MEDICARE

## 2024-07-01 DIAGNOSIS — M81.0 OSTEOPOROSIS, UNSPECIFIED OSTEOPOROSIS TYPE, UNSPECIFIED PATHOLOGICAL FRACTURE PRESENCE: ICD-10-CM

## 2024-07-01 DIAGNOSIS — F90.2 ATTENTION DEFICIT HYPERACTIVITY DISORDER (ADHD), COMBINED TYPE: ICD-10-CM

## 2024-07-01 DIAGNOSIS — F31.81 BIPOLAR II DISORDER (MULTI): ICD-10-CM

## 2024-07-01 PROCEDURE — 1123F ACP DISCUSS/DSCN MKR DOCD: CPT | Performed by: PSYCHIATRY & NEUROLOGY

## 2024-07-01 PROCEDURE — 99213 OFFICE O/P EST LOW 20 MIN: CPT | Performed by: PSYCHIATRY & NEUROLOGY

## 2024-07-01 RX ORDER — DENOSUMAB 60 MG/ML
60 INJECTION SUBCUTANEOUS
Qty: 1 ML | Refills: 1 | Status: SHIPPED | OUTPATIENT
Start: 2024-07-01

## 2024-07-01 ASSESSMENT — ENCOUNTER SYMPTOMS: PSYCHIATRIC NEGATIVE: 1

## 2024-07-01 NOTE — PROGRESS NOTES
"Subjective   Patient ID: Missy Patton is a 68 y.o. female who presents for medication management.  HPI patient reports summer is going well.  Seeing therapist Alyssa about 4-5 times now.  Is working on setting boundaries with people like mother and sister who can be very toxic at times.  Reading a book called \"boundary boss\" thinks her meds are working just fine.    Review of Systems   Psychiatric/Behavioral: Negative.         Objective   Physical Exam  Psychiatric:         Attention and Perception: Attention and perception normal.         Mood and Affect: Mood and affect normal.         Speech: Speech normal.         Behavior: Behavior normal. Behavior is cooperative.         Thought Content: Thought content normal.         Cognition and Memory: Cognition and memory normal.         Judgment: Judgment normal.         Assessment/Plan   Problem List Items Addressed This Visit             ICD-10-CM    ADHD (attention deficit hyperactivity disorder) F90.9     Continue current dose of Vyvanse.  Follow-up in 3 months         Bipolar II disorder (Multi) F31.81     Continue current med regimen to prevent symptom recurrence.  Follow-up in 3 months                 Gutierrez Serrano MD 07/01/24 6:01 PM   "

## 2024-07-09 ENCOUNTER — TELEPHONE (OUTPATIENT)
Dept: PRIMARY CARE | Facility: CLINIC | Age: 69
End: 2024-07-09

## 2024-07-09 ENCOUNTER — APPOINTMENT (OUTPATIENT)
Dept: ENDOCRINOLOGY | Facility: CLINIC | Age: 69
End: 2024-07-09
Payer: MEDICARE

## 2024-07-09 VITALS
DIASTOLIC BLOOD PRESSURE: 76 MMHG | WEIGHT: 144.2 LBS | HEIGHT: 59 IN | BODY MASS INDEX: 29.07 KG/M2 | SYSTOLIC BLOOD PRESSURE: 124 MMHG | RESPIRATION RATE: 16 BRPM | HEART RATE: 76 BPM

## 2024-07-09 DIAGNOSIS — Z78.0 MENOPAUSE: ICD-10-CM

## 2024-07-09 DIAGNOSIS — R05.9 COUGH, UNSPECIFIED TYPE: Primary | ICD-10-CM

## 2024-07-09 DIAGNOSIS — M81.0 OSTEOPOROSIS, UNSPECIFIED OSTEOPOROSIS TYPE, UNSPECIFIED PATHOLOGICAL FRACTURE PRESENCE: Primary | ICD-10-CM

## 2024-07-09 PROCEDURE — 1159F MED LIST DOCD IN RCRD: CPT | Performed by: INTERNAL MEDICINE

## 2024-07-09 PROCEDURE — 1123F ACP DISCUSS/DSCN MKR DOCD: CPT | Performed by: INTERNAL MEDICINE

## 2024-07-09 PROCEDURE — 1160F RVW MEDS BY RX/DR IN RCRD: CPT | Performed by: INTERNAL MEDICINE

## 2024-07-09 PROCEDURE — 3078F DIAST BP <80 MM HG: CPT | Performed by: INTERNAL MEDICINE

## 2024-07-09 PROCEDURE — 3074F SYST BP LT 130 MM HG: CPT | Performed by: INTERNAL MEDICINE

## 2024-07-09 PROCEDURE — 1036F TOBACCO NON-USER: CPT | Performed by: INTERNAL MEDICINE

## 2024-07-09 PROCEDURE — 99213 OFFICE O/P EST LOW 20 MIN: CPT | Performed by: INTERNAL MEDICINE

## 2024-07-09 RX ORDER — DOXYCYCLINE 100 MG/1
100 CAPSULE ORAL 2 TIMES DAILY
Qty: 28 CAPSULE | Refills: 0 | Status: SHIPPED | OUTPATIENT
Start: 2024-07-09 | End: 2024-07-23

## 2024-07-09 ASSESSMENT — ENCOUNTER SYMPTOMS
DIARRHEA: 0
FATIGUE: 0
HEADACHES: 0
CHILLS: 0
PALPITATIONS: 0
VOMITING: 0
NAUSEA: 0
COUGH: 0
SHORTNESS OF BREATH: 0
FEVER: 0

## 2024-07-09 NOTE — PROGRESS NOTES
Endocrinology: Follow up visit  Subjective   Patient ID: Missy Patton is a 68 y.o. female who presents for Osteoporosis and Vitamin D Deficiency.    PCP: Stephen Myers MD    HPI  Since last visit continues to do well on prolia.   Due for injection when it comes in the next few days.   Feeling fine.   No complaints.  Taking d most of the time but not good about calcium supplements....drinks almond milk and non dairy yogurts      Review of Systems   Constitutional:  Negative for chills, fatigue and fever.   Respiratory:  Negative for cough and shortness of breath.    Cardiovascular:  Negative for chest pain and palpitations.   Gastrointestinal:  Negative for diarrhea, nausea and vomiting.   Neurological:  Negative for headaches.       Patient Active Problem List   Diagnosis    Abdominal cramps    Acute sinusitis    Acute pain of left knee    Acute bacterial conjunctivitis of right eye    Allergic rhinitis due to mold    Asthma exacerbation ([facility]-HCC)    Asthma ([facility]-HCC)    Arthralgia    ADHD (attention deficit hyperactivity disorder)    Attention deficit disorder without hyperactivity    Astigmatism of both eyes with presbyopia    Benign colonic polyp    Bilateral presbyopia    Bipolar II disorder (Multi)    Blood in urine    Constipation    Contusion of ankle, left    Combined form of age-related cataract, both eyes    Candida esophagitis (Multi)    Depression    Dermatitis, eczematoid    Dermatochalasis    Bilateral dry eyes    Dyspnea    Dysuria    Elevated LFTs    Chronic fatigue syndrome    Hyperopia    Fatigue    Fibrocystic breast disease    GERD without esophagitis    Groin rash    Hematuria, gross    Human papilloma virus infection    Hyperlipidemia    Hyperparathyroidism (Multi)    Hypertension    IBS (irritable bowel syndrome)    Left ankle pain    Meniscal injury    Myopia of both eyes with regular astigmatism    Nail fungus    Nephrolithiasis    Organic hypersomnia    Osteoporosis    Other  instability, left ankle    Acute back pain    Lower back pain    Pelvic cramping    Raynaud phenomenon    Swelling of thigh    Thrush    Thyroid enlargement    Vocal cord dysfunction    Vitamin D deficiency    Vasomotor rhinitis    Vaginal dryness, menopausal    Urinary problem    Vaginitis    Sinusitis    Rhinitis    Abnormal blood chemistry    Allergic contact dermatitis due to other agents    Well adult exam    Acute pain of right shoulder    Hemangioma of skin and subcutaneous tissue    Koilonychia    Melanocytic nevi of trunk    Melanocytic nevi of unspecified lower limb, including hip    Melanocytic nevi of unspecified upper limb, including shoulder    Melanocytic nevi of other parts of face    Neoplasm of uncertain behavior of skin    Onycholysis    Xerosis cutis    Other melanin hyperpigmentation    Other seborrheic keratosis    Shoulder impingement, right    Skin changes due to chronic exposure to nonionizing radiation, unspecified    Stiffness of right shoulder joint    Astigmatism of both eyes    Astigmatism    Hyperopia with presbyopia    Colon polyps    Dry eyes, bilateral    Paresthesia of skin    Pain of back and right lower extremity    Surgical procedure, elective    Acute cough        Home Meds:  Current Outpatient Medications   Medication Instructions    albuterol 90 mcg/actuation inhaler 2 puffs, inhalation, Every 4 hours PRN, Every 4 to 6 hours as needed for shortness of breath and wheezng    albuterol 2.5 mg, nebulization, Every 4 hours PRN    cholecalciferol (Vitamin D-3) 125 MCG (5000 UT) capsule 1 capsule, oral, Daily    ciclopirox (Loprox) 0.77 % gel 1 Application, topical (top), 2 times daily    doxycycline (VIBRA-TABS) 100 mg, 2 times daily    estradiol (ESTRACE) 1 g, vaginal, Every other day    lamoTRIgine (LAMICTAL) 200 mg, oral, Daily    levocetirizine (XYZAL) 5 mg, oral, Every evening    lipase-protease-amylase 9,000-112,500- 112,500 unit capsule oral    lithium ER (LITHOBID) 300 mg,  "oral, Daily    metoprolol succinate XL (TOPROL-XL) 12.5 mg, oral, Daily    mirtazapine (REMERON) 7.5 mg, oral, Nightly    mometasone-formoterol (Dulera) 200-5 mcg/actuation inhaler 2 puffs, inhalation, 2 times daily, Rinse mouth afterwards. Use with spacer    omeprazole (PRILOSEC) 20 mg, oral, Daily before breakfast, Every evening 30 minutes before dinner on an empty stomach    Prolia 60 mg, subcutaneous, Every 6 months    Vyvanse 60 mg, oral, Every morning        Allergies   Allergen Reactions    Amoxicillin Unknown    Linalool Hives, Itching and Swelling    Other Hives and Other     Abdominal pain    Sulfa (Sulfonamide Antibiotics) Unknown     Nausea        Objective   Vitals:    07/09/24 1334   BP: 124/76   Pulse: 76   Resp: 16      Vitals:    07/09/24 1334   Weight: 65.4 kg (144 lb 3.2 oz)      Body mass index is 29.12 kg/m².   Physical Exam  Constitutional:       Appearance: Normal appearance. She is overweight.   HENT:      Head: Normocephalic and atraumatic.   Neck:      Thyroid: No thyroid mass, thyromegaly or thyroid tenderness.   Cardiovascular:      Rate and Rhythm: Normal rate and regular rhythm.      Heart sounds: No murmur heard.     No gallop.   Pulmonary:      Effort: Pulmonary effort is normal.      Breath sounds: Normal breath sounds.   Abdominal:      Palpations: Abdomen is soft.      Comments: benign   Neurological:      General: No focal deficit present.      Mental Status: She is alert and oriented to person, place, and time.      Deep Tendon Reflexes: Reflexes are normal and symmetric.   Psychiatric:         Behavior: Behavior is cooperative.         Labs:  Lab Results   Component Value Date    HGBA1C 5.3 05/20/2024    TSH 1.72 05/20/2024    FREET4 1.40 10/03/2022      No results found for: \"PR1\", \"THYROIDPAB\", \"TSI\"     Assessment/Plan   Problem List Items Addressed This Visit       Osteoporosis - Primary    Relevant Orders    Comprehensive Metabolic Panel    XR DEXA bone density     Other " Visit Diagnoses       Menopause        Relevant Orders    XR DEXA bone density        Due for dexa: ordered  Continue prolia, doing well on it  Follow up in one year with injections every 6 months      Electronically signed by:  Sydnee Walker MD 07/09/24 2:18 PM

## 2024-07-09 NOTE — TELEPHONE ENCOUNTER
Patient called in stating she is still having some sinus issues , and would like another round of abx since they've helped previously . Pended doxy

## 2024-07-12 DIAGNOSIS — M81.0 OSTEOPOROSIS, UNSPECIFIED OSTEOPOROSIS TYPE, UNSPECIFIED PATHOLOGICAL FRACTURE PRESENCE: Primary | ICD-10-CM

## 2024-07-15 ENCOUNTER — LAB (OUTPATIENT)
Dept: LAB | Facility: LAB | Age: 69
End: 2024-07-15
Payer: MEDICARE

## 2024-07-15 ENCOUNTER — APPOINTMENT (OUTPATIENT)
Dept: ENDOCRINOLOGY | Facility: CLINIC | Age: 69
End: 2024-07-15
Payer: MEDICARE

## 2024-07-15 DIAGNOSIS — M81.0 OSTEOPOROSIS, UNSPECIFIED OSTEOPOROSIS TYPE, UNSPECIFIED PATHOLOGICAL FRACTURE PRESENCE: ICD-10-CM

## 2024-07-15 DIAGNOSIS — M81.0 OSTEOPOROSIS, UNSPECIFIED OSTEOPOROSIS TYPE, UNSPECIFIED PATHOLOGICAL FRACTURE PRESENCE: Primary | ICD-10-CM

## 2024-07-15 PROCEDURE — 36415 COLL VENOUS BLD VENIPUNCTURE: CPT

## 2024-07-15 PROCEDURE — 96372 THER/PROPH/DIAG INJ SC/IM: CPT | Performed by: INTERNAL MEDICINE

## 2024-07-15 PROCEDURE — 80053 COMPREHEN METABOLIC PANEL: CPT

## 2024-07-16 LAB
ALBUMIN SERPL BCP-MCNC: 4.7 G/DL (ref 3.4–5)
ALP SERPL-CCNC: 84 U/L (ref 33–136)
ALT SERPL W P-5'-P-CCNC: 21 U/L (ref 7–45)
ANION GAP SERPL CALC-SCNC: 11 MMOL/L (ref 10–20)
AST SERPL W P-5'-P-CCNC: 22 U/L (ref 9–39)
BILIRUB SERPL-MCNC: 0.3 MG/DL (ref 0–1.2)
BUN SERPL-MCNC: 21 MG/DL (ref 6–23)
CALCIUM SERPL-MCNC: 10.1 MG/DL (ref 8.6–10.6)
CHLORIDE SERPL-SCNC: 104 MMOL/L (ref 98–107)
CO2 SERPL-SCNC: 27 MMOL/L (ref 21–32)
CREAT SERPL-MCNC: 0.97 MG/DL (ref 0.5–1.05)
EGFRCR SERPLBLD CKD-EPI 2021: 65 ML/MIN/1.73M*2
GLUCOSE SERPL-MCNC: 102 MG/DL (ref 74–99)
POTASSIUM SERPL-SCNC: 4.4 MMOL/L (ref 3.5–5.3)
PROT SERPL-MCNC: 6.8 G/DL (ref 6.4–8.2)
SODIUM SERPL-SCNC: 138 MMOL/L (ref 136–145)

## 2024-07-17 ENCOUNTER — HOSPITAL ENCOUNTER (OUTPATIENT)
Dept: RADIOLOGY | Facility: CLINIC | Age: 69
Discharge: HOME | End: 2024-07-17
Payer: MEDICARE

## 2024-07-17 DIAGNOSIS — M81.0 OSTEOPOROSIS, UNSPECIFIED OSTEOPOROSIS TYPE, UNSPECIFIED PATHOLOGICAL FRACTURE PRESENCE: ICD-10-CM

## 2024-07-17 DIAGNOSIS — Z78.0 MENOPAUSE: ICD-10-CM

## 2024-07-17 PROCEDURE — 77080 DXA BONE DENSITY AXIAL: CPT

## 2024-07-17 ASSESSMENT — LIFESTYLE VARIABLES
3_OR_MORE_DRINKS_PER_DAY: N
CURRENT_SMOKER: N

## 2024-07-26 DIAGNOSIS — F90.2 ATTENTION DEFICIT HYPERACTIVITY DISORDER (ADHD), COMBINED TYPE: ICD-10-CM

## 2024-07-26 RX ORDER — LISDEXAMFETAMINE DIMESYLATE 60 MG/1
60 CAPSULE ORAL EVERY MORNING
Qty: 30 CAPSULE | Refills: 0 | Status: SHIPPED | OUTPATIENT
Start: 2024-07-26 | End: 2024-08-25

## 2024-07-29 ENCOUNTER — APPOINTMENT (OUTPATIENT)
Dept: PRIMARY CARE | Facility: CLINIC | Age: 69
End: 2024-07-29
Payer: MEDICARE

## 2024-07-29 VITALS
DIASTOLIC BLOOD PRESSURE: 90 MMHG | WEIGHT: 142 LBS | OXYGEN SATURATION: 96 % | RESPIRATION RATE: 16 BRPM | HEART RATE: 88 BPM | HEIGHT: 59 IN | TEMPERATURE: 98 F | SYSTOLIC BLOOD PRESSURE: 132 MMHG | BODY MASS INDEX: 28.63 KG/M2

## 2024-07-29 DIAGNOSIS — Z12.11 COLON CANCER SCREENING: Primary | ICD-10-CM

## 2024-07-29 DIAGNOSIS — R22.31 NODULE OF SKIN OF RIGHT THUMB: ICD-10-CM

## 2024-07-29 PROCEDURE — 99213 OFFICE O/P EST LOW 20 MIN: CPT | Performed by: INTERNAL MEDICINE

## 2024-07-29 PROCEDURE — 1123F ACP DISCUSS/DSCN MKR DOCD: CPT | Performed by: INTERNAL MEDICINE

## 2024-07-29 PROCEDURE — 3075F SYST BP GE 130 - 139MM HG: CPT | Performed by: INTERNAL MEDICINE

## 2024-07-29 PROCEDURE — 1036F TOBACCO NON-USER: CPT | Performed by: INTERNAL MEDICINE

## 2024-07-29 PROCEDURE — 3008F BODY MASS INDEX DOCD: CPT | Performed by: INTERNAL MEDICINE

## 2024-07-29 PROCEDURE — 1159F MED LIST DOCD IN RCRD: CPT | Performed by: INTERNAL MEDICINE

## 2024-07-29 PROCEDURE — 3080F DIAST BP >= 90 MM HG: CPT | Performed by: INTERNAL MEDICINE

## 2024-07-29 NOTE — PROGRESS NOTES
Subjective   Patient ID: Missy Patton is a 68 y.o. female who presents for lump.    In for follow up for right hand inflammation on right thumb        Review of Systems   All other systems reviewed and are negative.      Previous history  Past Medical History:   Diagnosis Date    Asthma ([facility])     Depression     Hypertension     Irritable bowel syndrome     Osteoporosis     Other conditions influencing health status 2014    Urinary Tract Infection    Otitis media, unspecified, left ear 2013    Otitis media of left ear    Papillomavirus as the cause of diseases classified elsewhere 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    Personal history of diseases of the skin and subcutaneous tissue 2015    History of cyst of breast    Personal history of diseases of the skin and subcutaneous tissue     History of keloid of skin    Personal history of other diseases of the female genital tract 2014    History of vaginitis    Personal history of other diseases of the respiratory system 2013    History of pharyngitis     Past Surgical History:   Procedure Laterality Date    BREAST SURGERY  2013    Breast Surgery     SECTION, CLASSIC  2014     Section    KIDNEY STONE SURGERY  2017    OTHER SURGICAL HISTORY  2013    Breast Surgery Puncture Aspiration Of Cyst    OTHER SURGICAL HISTORY  2021    Rhytidectomy    OTHER SURGICAL HISTORY  2013    Ear Surgery    OTHER SURGICAL HISTORY  2013    Surgery    RHINOPLASTY  2013    Rhinoplasty    TONSILLECTOMY  2014    Tonsillectomy     Social History     Tobacco Use    Smoking status: Never    Smokeless tobacco: Never   Vaping Use    Vaping status: Never Used   Substance Use Topics    Alcohol use: Yes     Comment: 3    Drug use: Never     Family History   Problem Relation Name Age of Onset    Hypertension Father      Other (Mixed connective tissue disease) Son      Hypertension Paternal Grandmother       Cancer Paternal Grandfather      Heart disease Paternal Grandfather      Diabetes Other Family history     Cancer Other Family history     Coronary artery disease Other Family history     Other (Non-Hodgkin's Lymphoma) Other Family history      Allergies   Allergen Reactions    Amoxicillin Unknown    Linalool Hives, Itching and Swelling    Other Hives and Other     Abdominal pain    Sulfa (Sulfonamide Antibiotics) Unknown     Nausea     Current Outpatient Medications   Medication Instructions    albuterol 90 mcg/actuation inhaler 2 puffs, inhalation, Every 4 hours PRN, Every 4 to 6 hours as needed for shortness of breath and wheezng    albuterol 2.5 mg, nebulization, Every 4 hours PRN    cholecalciferol (Vitamin D-3) 125 MCG (5000 UT) capsule 1 capsule, oral, Daily    ciclopirox (Loprox) 0.77 % gel 1 Application, topical (top), 2 times daily    doxycycline (VIBRA-TABS) 100 mg, 2 times daily    estradiol (ESTRACE) 1 g, vaginal, Every other day    lamoTRIgine (LAMICTAL) 200 mg, oral, Daily    levocetirizine (XYZAL) 5 mg, oral, Every evening    lipase-protease-amylase 9,000-112,500- 112,500 unit capsule oral    lithium ER (LITHOBID) 300 mg, oral, Daily    metoprolol succinate XL (TOPROL-XL) 12.5 mg, oral, Daily    mirtazapine (REMERON) 7.5 mg, oral, Nightly    mometasone-formoterol (Dulera) 200-5 mcg/actuation inhaler 2 puffs, inhalation, 2 times daily, Rinse mouth afterwards. Use with spacer    omeprazole (PRILOSEC) 20 mg, oral, Daily before breakfast, Every evening 30 minutes before dinner on an empty stomach    Prolia 60 mg, subcutaneous, Every 6 months    Vyvanse 60 mg, oral, Every morning       Objective       Physical Exam  Right thumb nodule,    Assessment/Plan   Missy Patton is a 68 y.o. female who presents for the concerns below:    Problem List Items Addressed This Visit    None       Thumb nodule will re3fer to hand surgery.    Discussed with:   Return in :    Portions of this note were generated  using digital voice recognition software, and may contain grammatical errors       Stephen Myers MD  07/29/24  9:47 AM

## 2024-08-07 DIAGNOSIS — J32.9 SINUSITIS, UNSPECIFIED CHRONICITY, UNSPECIFIED LOCATION: Primary | ICD-10-CM

## 2024-08-07 RX ORDER — AZITHROMYCIN 250 MG/1
250 TABLET, FILM COATED ORAL DAILY
COMMUNITY
End: 2024-08-07 | Stop reason: SDUPTHER

## 2024-08-07 NOTE — TELEPHONE ENCOUNTER
Patient called in asked if she can have antibiotics for a sinus infection, traveling in a few weeks to [facility], Advise?

## 2024-08-08 ENCOUNTER — TELEPHONE (OUTPATIENT)
Dept: PRIMARY CARE | Facility: CLINIC | Age: 69
End: 2024-08-08
Payer: MEDICARE

## 2024-08-08 RX ORDER — AZITHROMYCIN 250 MG/1
250 TABLET, FILM COATED ORAL DAILY
Qty: 6 TABLET | Refills: 0 | OUTPATIENT
Start: 2024-08-08

## 2024-08-08 NOTE — TELEPHONE ENCOUNTER
Asked for advice on Covid vaccine.  Should she get the most recent one, or the previous 2 stage one?

## 2024-08-12 ENCOUNTER — TELEPHONE (OUTPATIENT)
Dept: PRIMARY CARE | Facility: CLINIC | Age: 69
End: 2024-08-12
Payer: MEDICARE

## 2024-08-12 ENCOUNTER — LAB (OUTPATIENT)
Dept: LAB | Facility: LAB | Age: 69
End: 2024-08-12
Payer: MEDICARE

## 2024-08-12 DIAGNOSIS — N39.0 URINARY TRACT INFECTION WITHOUT HEMATURIA, SITE UNSPECIFIED: ICD-10-CM

## 2024-08-12 LAB
APPEARANCE UR: ABNORMAL
BILIRUB UR STRIP.AUTO-MCNC: NEGATIVE MG/DL
COLOR UR: ABNORMAL
GLUCOSE UR STRIP.AUTO-MCNC: NORMAL MG/DL
KETONES UR STRIP.AUTO-MCNC: NEGATIVE MG/DL
LEUKOCYTE ESTERASE UR QL STRIP.AUTO: ABNORMAL
NITRITE UR QL STRIP.AUTO: NEGATIVE
PH UR STRIP.AUTO: 6 [PH]
PROT UR STRIP.AUTO-MCNC: ABNORMAL MG/DL
RBC # UR STRIP.AUTO: ABNORMAL /UL
RBC #/AREA URNS AUTO: >20 /HPF
SP GR UR STRIP.AUTO: 1.02
SQUAMOUS #/AREA URNS AUTO: ABNORMAL /HPF
UROBILINOGEN UR STRIP.AUTO-MCNC: NORMAL MG/DL
WBC #/AREA URNS AUTO: >50 /HPF

## 2024-08-12 PROCEDURE — 81001 URINALYSIS AUTO W/SCOPE: CPT

## 2024-08-27 ENCOUNTER — TELEPHONE (OUTPATIENT)
Dept: BEHAVIORAL HEALTH | Facility: CLINIC | Age: 69
End: 2024-08-27
Payer: MEDICARE

## 2024-08-27 DIAGNOSIS — F90.2 ATTENTION DEFICIT HYPERACTIVITY DISORDER (ADHD), COMBINED TYPE: ICD-10-CM

## 2024-08-27 RX ORDER — LISDEXAMFETAMINE DIMESYLATE 60 MG/1
60 CAPSULE ORAL EVERY MORNING
Qty: 30 CAPSULE | Refills: 0 | Status: SHIPPED | OUTPATIENT
Start: 2024-08-27 | End: 2024-09-26

## 2024-09-23 ENCOUNTER — TELEPHONE (OUTPATIENT)
Dept: BEHAVIORAL HEALTH | Facility: CLINIC | Age: 69
End: 2024-09-23
Payer: MEDICARE

## 2024-09-23 DIAGNOSIS — F90.2 ATTENTION DEFICIT HYPERACTIVITY DISORDER (ADHD), COMBINED TYPE: ICD-10-CM

## 2024-09-23 RX ORDER — LISDEXAMFETAMINE DIMESYLATE 60 MG/1
60 CAPSULE ORAL EVERY MORNING
Qty: 30 CAPSULE | Refills: 0 | Status: SHIPPED | OUTPATIENT
Start: 2024-09-23 | End: 2024-10-23

## 2024-10-07 ENCOUNTER — APPOINTMENT (OUTPATIENT)
Dept: BEHAVIORAL HEALTH | Facility: CLINIC | Age: 69
End: 2024-10-07
Payer: MEDICARE

## 2024-10-07 DIAGNOSIS — F90.2 ATTENTION DEFICIT HYPERACTIVITY DISORDER (ADHD), COMBINED TYPE: ICD-10-CM

## 2024-10-07 DIAGNOSIS — F31.81 BIPOLAR II DISORDER (MULTI): ICD-10-CM

## 2024-10-07 PROCEDURE — 1123F ACP DISCUSS/DSCN MKR DOCD: CPT | Performed by: PSYCHIATRY & NEUROLOGY

## 2024-10-07 PROCEDURE — 99214 OFFICE O/P EST MOD 30 MIN: CPT | Performed by: PSYCHIATRY & NEUROLOGY

## 2024-10-07 RX ORDER — LISDEXAMFETAMINE DIMESYLATE 60 MG/1
60 CAPSULE ORAL EVERY MORNING
Qty: 30 CAPSULE | Refills: 0 | Status: SHIPPED | OUTPATIENT
Start: 2024-12-06 | End: 2025-01-05

## 2024-10-07 RX ORDER — LISDEXAMFETAMINE DIMESYLATE 60 MG/1
60 CAPSULE ORAL EVERY MORNING
Qty: 30 CAPSULE | Refills: 0 | Status: SHIPPED | OUTPATIENT
Start: 2024-11-06 | End: 2024-12-06

## 2024-10-07 RX ORDER — LISDEXAMFETAMINE DIMESYLATE 60 MG/1
60 CAPSULE ORAL EVERY MORNING
Qty: 30 CAPSULE | Refills: 0 | Status: SHIPPED | OUTPATIENT
Start: 2024-10-07 | End: 2024-11-06

## 2024-10-07 ASSESSMENT — ENCOUNTER SYMPTOMS: PSYCHIATRIC NEGATIVE: 1

## 2024-10-07 NOTE — PROGRESS NOTES
"Subjective   Patient ID: Missy Patton is a 68 y.o. female who presents for med management plus supportive therapy half-hour visit.Virtual or Telephone Consent    An interactive audio and video telecommunication system which permits real time communications between the patient (at the originating site) and provider (at the distant site) was utilized to provide this telehealth service.   Verbal consent was requested and obtained from Missy Patton on this date, 10/07/24 for a telehealth visit.    HPI patient reports \"everything is pretty good\" came back from a trip to the [place].  She is working very hard in therapy with Alyssa Madera reports her mother is being so mean to her loves her grandchild and IBS is acting up.  Thinks her meds are working very well for her no complaints of side effects.    Review of Systems   Psychiatric/Behavioral: Negative.         Objective   Physical Exam  Psychiatric:         Attention and Perception: Attention and perception normal.         Mood and Affect: Mood and affect normal.         Speech: Speech normal.         Behavior: Behavior normal. Behavior is cooperative.         Thought Content: Thought content normal.         Cognition and Memory: Cognition and memory normal.         Judgment: Judgment normal.         Assessment/Plan   Problem List Items Addressed This Visit             ICD-10-CM    ADHD (attention deficit hyperactivity disorder) F90.9     Continue Vyvanse         Relevant Medications    Vyvanse 60 mg capsule    Vyvanse 60 mg capsule (Start on 11/6/2024)    Vyvanse 60 mg capsule (Start on 12/6/2024)    Bipolar II disorder (Multi) F31.81     Continue current med regimen to prevent symptom recurrence                 Gutierrez Serrano MD 10/07/24 4:59 PM   "

## 2024-10-28 ENCOUNTER — TELEPHONE (OUTPATIENT)
Dept: PRIMARY CARE | Facility: CLINIC | Age: 69
End: 2024-10-28

## 2024-10-28 NOTE — TELEPHONE ENCOUNTER
5/23/24 visit was coded as:    ND INITIAL PREVENTIVE EXAM []     Per pt's birthday, she was 68yrs old; should be Medicare Annual, not Initial.   Pt asked for coding correction.

## 2024-12-05 DIAGNOSIS — Z01.818 PREOPERATIVE CLEARANCE: ICD-10-CM

## 2024-12-05 DIAGNOSIS — H02.89 OTHER SPECIFIED DISORDERS OF EYELID: ICD-10-CM

## 2024-12-05 DIAGNOSIS — Z98.890 HISTORY OF SURGERY: ICD-10-CM

## 2024-12-24 ENCOUNTER — OFFICE VISIT (OUTPATIENT)
Dept: URGENT CARE | Age: 69
End: 2024-12-24
Payer: MEDICARE

## 2024-12-24 VITALS
DIASTOLIC BLOOD PRESSURE: 85 MMHG | SYSTOLIC BLOOD PRESSURE: 136 MMHG | HEART RATE: 76 BPM | OXYGEN SATURATION: 99 % | TEMPERATURE: 98.2 F

## 2024-12-24 DIAGNOSIS — U07.1 2019 NOVEL CORONAVIRUS DISEASE (COVID-19): Primary | ICD-10-CM

## 2024-12-24 DIAGNOSIS — R05.9 COUGH, UNSPECIFIED TYPE: ICD-10-CM

## 2024-12-24 LAB
POC BINAX EXPIRATION: 0
POC BINAX NOW COVID SERIAL NUMBER: 0
POC SARS-COV-2 AG BINAX: ABNORMAL

## 2024-12-24 PROCEDURE — 87811 SARS-COV-2 COVID19 W/OPTIC: CPT | Performed by: SPECIALIST

## 2024-12-24 PROCEDURE — 1159F MED LIST DOCD IN RCRD: CPT | Performed by: SPECIALIST

## 2024-12-24 PROCEDURE — 3075F SYST BP GE 130 - 139MM HG: CPT | Performed by: SPECIALIST

## 2024-12-24 PROCEDURE — 1123F ACP DISCUSS/DSCN MKR DOCD: CPT | Performed by: SPECIALIST

## 2024-12-24 PROCEDURE — 99214 OFFICE O/P EST MOD 30 MIN: CPT | Performed by: SPECIALIST

## 2024-12-24 PROCEDURE — 3079F DIAST BP 80-89 MM HG: CPT | Performed by: SPECIALIST

## 2024-12-24 RX ORDER — NIRMATRELVIR AND RITONAVIR 300-100 MG
3 KIT ORAL 2 TIMES DAILY
Qty: 30 TABLET | Refills: 0 | Status: SHIPPED | OUTPATIENT
Start: 2024-12-24 | End: 2024-12-29

## 2024-12-24 ASSESSMENT — ENCOUNTER SYMPTOMS
COUGH: 1
CONSTITUTIONAL NEGATIVE: 1

## 2024-12-24 NOTE — PROGRESS NOTES
Subjective   Patient ID: Missy Patton is a 69 y.o. female. They present today with a chief complaint of Cough (Cough x 4 days, patient states she has asthma and as of yesterday she symptoms became upper respiratory. ).    History of Present Illness    Cough        Past Medical History  Allergies as of 2024 - Reviewed 2024   Allergen Reaction Noted    Amoxicillin Unknown 2018    Linalool Hives, Itching, and Swelling 2021    Other Hives and Other 2023    Sulfa (sulfonamide antibiotics) Unknown 2013       (Not in a hospital admission)       Past Medical History:   Diagnosis Date    Asthma ([facility])     Depression     Hypertension     Irritable bowel syndrome     Osteoporosis     Other conditions influencing health status 2014    Urinary Tract Infection    Otitis media, unspecified, left ear 2013    Otitis media of left ear    Papillomavirus as the cause of diseases classified elsewhere 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    Personal history of diseases of the skin and subcutaneous tissue 2015    History of cyst of breast    Personal history of diseases of the skin and subcutaneous tissue     History of keloid of skin    Personal history of other diseases of the female genital tract 2014    History of vaginitis    Personal history of other diseases of the respiratory system 2013    History of pharyngitis       Past Surgical History:   Procedure Laterality Date    BREAST SURGERY  2013    Breast Surgery     SECTION, CLASSIC  2014     Section    KIDNEY STONE SURGERY      OTHER SURGICAL HISTORY  2013    Breast Surgery Puncture Aspiration Of Cyst    OTHER SURGICAL HISTORY  2021    Rhytidectomy    OTHER SURGICAL HISTORY  2013    Ear Surgery    OTHER SURGICAL HISTORY  2013    Surgery    RHINOPLASTY  2013    Rhinoplasty    TONSILLECTOMY  2014    Tonsillectomy        reports that she has never  smoked. She has never used smokeless tobacco. She reports current alcohol use. She reports that she does not use drugs.    Review of Systems  Review of Systems   Constitutional: Negative.    HENT:  Positive for congestion.    Respiratory:  Positive for cough.                                   Objective    Vitals:    12/24/24 1212   BP: 136/85   BP Location: Left arm   Patient Position: Sitting   BP Cuff Size: Large adult   Pulse: 76   Temp: 36.8 °C (98.2 °F)   TempSrc: Oral   SpO2: 99%     No LMP recorded. Patient is postmenopausal.    Physical Exam  Constitutional:       Appearance: Normal appearance.   HENT:      Right Ear: Tympanic membrane normal.      Left Ear: Tympanic membrane normal.      Nose: Congestion present.      Mouth/Throat:      Pharynx: Posterior oropharyngeal erythema present.   Eyes:      Conjunctiva/sclera: Conjunctivae normal.   Cardiovascular:      Rate and Rhythm: Normal rate and regular rhythm.   Pulmonary:      Effort: Pulmonary effort is normal.      Breath sounds: Normal breath sounds.   Neurological:      Mental Status: She is alert.         Procedures    Point of Care Test & Imaging Results from this visit  Results for orders placed or performed in visit on 12/24/24   POCT Covid-19 Rapid Antigen   Result Value Ref Range    Binax NOW Covid Serial Number 0     BINAX NOW Covid Expiration 0     POC NACHO-COV-2 AG  Presumptive negative test for SARS-CoV-2 (no antigen detected)     Presumptive negative test for SARS-CoV-2 (no antigen detected)      No results found.    Diagnostic study results (if any) were reviewed by Sophia Carson MD.    Assessment/Plan   Allergies, medications, history, and pertinent labs/EKGs/Imaging reviewed by Sophia Carson MD.     Medical Decision Making      Orders and Diagnoses  Diagnoses and all orders for this visit:  Cough, unspecified type  -     POCT Covid-19 Rapid Antigen      Medical Admin Record      Patient disposition: Home    Electronically signed by  Sophia Carson MD  12:32 PM

## 2024-12-24 NOTE — PATIENT INSTRUCTIONS
Drink plenty of Fluids   Continue all your Asthma inhalers   Wear a face mask for 10 days of start of symptoms  If your symptoms got worse go to ED

## 2024-12-30 ENCOUNTER — LAB (OUTPATIENT)
Dept: LAB | Facility: LAB | Age: 69
End: 2024-12-30
Payer: MEDICARE

## 2024-12-30 DIAGNOSIS — H02.89 OTHER SPECIFIED DISORDERS OF EYELID: ICD-10-CM

## 2024-12-30 DIAGNOSIS — Z01.818 PREOPERATIVE CLEARANCE: ICD-10-CM

## 2024-12-30 DIAGNOSIS — Z98.890 HISTORY OF SURGERY: ICD-10-CM

## 2024-12-30 LAB
ALBUMIN SERPL BCP-MCNC: 4.6 G/DL (ref 3.4–5)
ALP SERPL-CCNC: 77 U/L (ref 33–136)
ALT SERPL W P-5'-P-CCNC: 20 U/L (ref 7–45)
ANION GAP SERPL CALC-SCNC: 16 MMOL/L (ref 10–20)
APTT PPP: 33 SECONDS (ref 27–38)
AST SERPL W P-5'-P-CCNC: 22 U/L (ref 9–39)
BILIRUB SERPL-MCNC: 0.3 MG/DL (ref 0–1.2)
BUN SERPL-MCNC: 23 MG/DL (ref 6–23)
CALCIUM SERPL-MCNC: 9.5 MG/DL (ref 8.6–10.6)
CHLORIDE SERPL-SCNC: 104 MMOL/L (ref 98–107)
CO2 SERPL-SCNC: 22 MMOL/L (ref 21–32)
CREAT SERPL-MCNC: 1.19 MG/DL (ref 0.5–1.05)
EGFRCR SERPLBLD CKD-EPI 2021: 50 ML/MIN/1.73M*2
ERYTHROCYTE [DISTWIDTH] IN BLOOD BY AUTOMATED COUNT: 13.2 % (ref 11.5–14.5)
GLUCOSE SERPL-MCNC: 117 MG/DL (ref 74–99)
HCT VFR BLD AUTO: 42.9 % (ref 36–46)
HGB BLD-MCNC: 13.5 G/DL (ref 12–16)
INR PPP: 1 (ref 0.9–1.1)
MCH RBC QN AUTO: 28.6 PG (ref 26–34)
MCHC RBC AUTO-ENTMCNC: 31.5 G/DL (ref 32–36)
MCV RBC AUTO: 91 FL (ref 80–100)
NRBC BLD-RTO: 0 /100 WBCS (ref 0–0)
PLATELET # BLD AUTO: 334 X10*3/UL (ref 150–450)
POTASSIUM SERPL-SCNC: 4 MMOL/L (ref 3.5–5.3)
PROT SERPL-MCNC: 7 G/DL (ref 6.4–8.2)
PROTHROMBIN TIME: 11.4 SECONDS (ref 9.8–12.8)
RBC # BLD AUTO: 4.72 X10*6/UL (ref 4–5.2)
SODIUM SERPL-SCNC: 138 MMOL/L (ref 136–145)
WBC # BLD AUTO: 10.1 X10*3/UL (ref 4.4–11.3)

## 2024-12-30 PROCEDURE — 85027 COMPLETE CBC AUTOMATED: CPT

## 2024-12-30 PROCEDURE — 85730 THROMBOPLASTIN TIME PARTIAL: CPT

## 2024-12-30 PROCEDURE — 80053 COMPREHEN METABOLIC PANEL: CPT

## 2024-12-30 PROCEDURE — 85610 PROTHROMBIN TIME: CPT

## 2025-01-02 ENCOUNTER — TELEPHONE (OUTPATIENT)
Dept: PRIMARY CARE | Facility: CLINIC | Age: 70
End: 2025-01-02
Payer: MEDICARE

## 2025-01-02 DIAGNOSIS — M81.0 OSTEOPOROSIS, UNSPECIFIED OSTEOPOROSIS TYPE, UNSPECIFIED PATHOLOGICAL FRACTURE PRESENCE: ICD-10-CM

## 2025-01-02 RX ORDER — DENOSUMAB 60 MG/ML
60 INJECTION SUBCUTANEOUS
Qty: 1 ML | Refills: 1 | Status: SHIPPED | OUTPATIENT
Start: 2025-01-02

## 2025-01-02 NOTE — TELEPHONE ENCOUNTER
Patient was dx with Covid-19 1 week ago. On  12/24/2024 she went to the [facility]  in [place]. Her 2nd Covid-19 test was negative on that day. Patient was prescribed some  sinus medication , now she has an orange color drainage coming from her nose.  Please advise.

## 2025-01-06 ENCOUNTER — APPOINTMENT (OUTPATIENT)
Dept: PRIMARY CARE | Facility: CLINIC | Age: 70
End: 2025-01-06
Payer: MEDICARE

## 2025-01-06 ENCOUNTER — HOSPITAL ENCOUNTER (OUTPATIENT)
Dept: RADIOLOGY | Facility: CLINIC | Age: 70
Discharge: HOME | End: 2025-01-06
Payer: MEDICARE

## 2025-01-06 VITALS
SYSTOLIC BLOOD PRESSURE: 120 MMHG | WEIGHT: 145 LBS | BODY MASS INDEX: 29.29 KG/M2 | OXYGEN SATURATION: 98 % | DIASTOLIC BLOOD PRESSURE: 82 MMHG | HEART RATE: 78 BPM | TEMPERATURE: 97.5 F

## 2025-01-06 DIAGNOSIS — R05.1 ACUTE COUGH: Primary | ICD-10-CM

## 2025-01-06 DIAGNOSIS — Z79.899 LITHIUM USE: ICD-10-CM

## 2025-01-06 DIAGNOSIS — R05.1 ACUTE COUGH: ICD-10-CM

## 2025-01-06 DIAGNOSIS — Z01.818 PRE-OP TESTING: ICD-10-CM

## 2025-01-06 PROCEDURE — 3074F SYST BP LT 130 MM HG: CPT | Performed by: INTERNAL MEDICINE

## 2025-01-06 PROCEDURE — 1123F ACP DISCUSS/DSCN MKR DOCD: CPT | Performed by: INTERNAL MEDICINE

## 2025-01-06 PROCEDURE — 99213 OFFICE O/P EST LOW 20 MIN: CPT | Performed by: INTERNAL MEDICINE

## 2025-01-06 PROCEDURE — 71046 X-RAY EXAM CHEST 2 VIEWS: CPT | Performed by: RADIOLOGY

## 2025-01-06 PROCEDURE — 3079F DIAST BP 80-89 MM HG: CPT | Performed by: INTERNAL MEDICINE

## 2025-01-06 PROCEDURE — 71046 X-RAY EXAM CHEST 2 VIEWS: CPT

## 2025-01-06 NOTE — PROGRESS NOTES
Subjective   Patient ID: Missy Patton is a 69 y.o. female who presents for Follow-up (Repeated Sinus infections).    In for follow up cough and preop.        Review of Systems   All other systems reviewed and are negative.      Previous history  Past Medical History:   Diagnosis Date    Asthma ([facility])     Depression     Hypertension     Irritable bowel syndrome     Osteoporosis     Other conditions influencing health status 2014    Urinary Tract Infection    Otitis media, unspecified, left ear 2013    Otitis media of left ear    Papillomavirus as the cause of diseases classified elsewhere 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    Personal history of diseases of the skin and subcutaneous tissue 2015    History of cyst of breast    Personal history of diseases of the skin and subcutaneous tissue     History of keloid of skin    Personal history of other diseases of the female genital tract 2014    History of vaginitis    Personal history of other diseases of the respiratory system 2013    History of pharyngitis     Past Surgical History:   Procedure Laterality Date    BREAST SURGERY  2013    Breast Surgery     SECTION, CLASSIC  2014     Section    KIDNEY STONE SURGERY  2017    OTHER SURGICAL HISTORY  2013    Breast Surgery Puncture Aspiration Of Cyst    OTHER SURGICAL HISTORY  2021    Rhytidectomy    OTHER SURGICAL HISTORY  2013    Ear Surgery    OTHER SURGICAL HISTORY  2013    Surgery    RHINOPLASTY  2013    Rhinoplasty    TONSILLECTOMY  2014    Tonsillectomy     Social History     Tobacco Use    Smoking status: Never    Smokeless tobacco: Never   Vaping Use    Vaping status: Never Used   Substance Use Topics    Alcohol use: Yes     Comment: 3    Drug use: Never     Family History   Problem Relation Name Age of Onset    Hypertension Father      Other (Mixed connective tissue disease) Son      Hypertension Paternal Grandmother       Cancer Paternal Grandfather      Heart disease Paternal Grandfather      Diabetes Other Family history     Cancer Other Family history     Coronary artery disease Other Family history     Other (Non-Hodgkin's Lymphoma) Other Family history      Allergies   Allergen Reactions    Amoxicillin Unknown    Linalool Hives, Itching and Swelling    Other Hives and Other     Abdominal pain    Sulfa (Sulfonamide Antibiotics) Unknown     Nausea     Current Outpatient Medications   Medication Instructions    albuterol 90 mcg/actuation inhaler 2 puffs, inhalation, Every 4 hours PRN, Every 4 to 6 hours as needed for shortness of breath and wheezng    albuterol 2.5 mg, nebulization, Every 4 hours PRN    azithromycin (ZITHROMAX) 250 mg, oral, Daily    cholecalciferol (Vitamin D-3) 125 MCG (5000 UT) capsule 1 capsule, oral, Daily    ciclopirox (Loprox) 0.77 % gel 1 Application, topical (top), 2 times daily    doxycycline (VIBRA-TABS) 100 mg, 2 times daily    estradiol (ESTRACE) 1 g, vaginal, Every other day    lamoTRIgine (LAMICTAL) 200 mg, oral, Daily    levocetirizine (XYZAL) 5 mg, oral, Every evening    lipase-protease-amylase 9,000-112,500- 112,500 unit capsule oral    lithium ER (LITHOBID) 300 mg, oral, Daily    metoprolol succinate XL (TOPROL-XL) 12.5 mg, oral, Daily    mirtazapine (REMERON) 7.5 mg, oral, Nightly    mometasone-formoterol (Dulera) 200-5 mcg/actuation inhaler 2 puffs, inhalation, 2 times daily, Rinse mouth afterwards. Use with spacer    omeprazole (PRILOSEC) 20 mg, oral, Daily before breakfast, Every evening 30 minutes before dinner on an empty stomach    Prolia 60 mg, subcutaneous, Every 6 months    Vyvanse 60 mg, oral, Every morning    Vyvanse 60 mg, oral, Every morning    Vyvanse 60 mg, oral, Every morning    Vyvanse 60 mg, oral, Every morning       Objective       Physical Exam  Constitutional:       Appearance: Normal appearance.   HENT:      Head: Normocephalic.   Eyes:      Extraocular Movements:  Extraocular movements intact.      Conjunctiva/sclera: Conjunctivae normal.      Pupils: Pupils are equal, round, and reactive to light.   Cardiovascular:      Rate and Rhythm: Normal rate and regular rhythm.   Pulmonary:      Effort: Pulmonary effort is normal.      Breath sounds: Normal breath sounds.   Abdominal:      General: Abdomen is flat. Bowel sounds are normal.      Palpations: Abdomen is soft.   Musculoskeletal:         General: Normal range of motion.      Cervical back: Normal range of motion.   Skin:     General: Skin is warm and dry.   Neurological:      General: No focal deficit present.      Mental Status: She is alert and oriented to person, place, and time. Mental status is at baseline.   Psychiatric:         Mood and Affect: Mood normal.         Behavior: Behavior normal.           Assessment/Plan   Missy Patton is a 69 y.o. female who presents for the concerns below:    Problem List Items Addressed This Visit    None       Will follow. May proceed with surgery,.     Discussed with:   Return in :    Portions of this note were generated using digital voice recognition software, and may contain grammatical errors       Stephen Myers MD  01/06/25  11:07 AM

## 2025-01-07 ENCOUNTER — TELEPHONE (OUTPATIENT)
Dept: PRIMARY CARE | Facility: CLINIC | Age: 70
End: 2025-01-07
Payer: MEDICARE

## 2025-01-07 NOTE — TELEPHONE ENCOUNTER
Faxed over patient medication list, demographics. Last progress notes. Patient request this to be faxed to their Staff nurse at [phone].

## 2025-01-07 NOTE — TELEPHONE ENCOUNTER
VM asked about next steps:  Sinusitis is back.  Need to see ENT? Or is there something preventative she can do?  Last EKG indicated evidence of heart attack.  Need to repeat the EKG or next steps?

## 2025-01-15 DIAGNOSIS — F90.2 ATTENTION DEFICIT HYPERACTIVITY DISORDER (ADHD), COMBINED TYPE: ICD-10-CM

## 2025-01-15 RX ORDER — LISDEXAMFETAMINE DIMESYLATE 60 MG/1
60 CAPSULE ORAL EVERY MORNING
Qty: 30 CAPSULE | Refills: 0 | OUTPATIENT
Start: 2025-01-15 | End: 2025-02-14

## 2025-01-15 RX ORDER — LISDEXAMFETAMINE DIMESYLATE 60 MG/1
60 CAPSULE ORAL EVERY MORNING
Qty: 30 CAPSULE | Refills: 0 | Status: SHIPPED | OUTPATIENT
Start: 2025-01-27 | End: 2025-02-26

## 2025-01-15 NOTE — TELEPHONE ENCOUNTER
I put in a 30 day prescription that can be filled 1/27/25 as per [place], last 30 day fill was 12/30/24.

## 2025-01-16 NOTE — TELEPHONE ENCOUNTER
Patient was given XR results and would like to know if you want her to repeat here last EKG, due to having lotion on and leads not sticking? And possible MI.

## 2025-01-20 ENCOUNTER — APPOINTMENT (OUTPATIENT)
Dept: PRIMARY CARE | Facility: CLINIC | Age: 70
End: 2025-01-20
Payer: MEDICARE

## 2025-01-20 VITALS — DIASTOLIC BLOOD PRESSURE: 80 MMHG | TEMPERATURE: 97.3 F | HEART RATE: 86 BPM | SYSTOLIC BLOOD PRESSURE: 128 MMHG

## 2025-01-20 DIAGNOSIS — R94.31 ABNORMAL EKG: ICD-10-CM

## 2025-01-20 PROCEDURE — 99213 OFFICE O/P EST LOW 20 MIN: CPT | Performed by: INTERNAL MEDICINE

## 2025-01-20 PROCEDURE — 3074F SYST BP LT 130 MM HG: CPT | Performed by: INTERNAL MEDICINE

## 2025-01-20 PROCEDURE — 1123F ACP DISCUSS/DSCN MKR DOCD: CPT | Performed by: INTERNAL MEDICINE

## 2025-01-20 PROCEDURE — 3079F DIAST BP 80-89 MM HG: CPT | Performed by: INTERNAL MEDICINE

## 2025-01-20 NOTE — PROGRESS NOTES
Subjective   Patient ID: Missy Patton is a 69 y.o. female who presents for Abnormal ECG.  In for recheck on EKG. Possible old infarct        Review of Systems   All other systems reviewed and are negative.      Objective   Physical Exam  /80 (BP Location: Left arm, Patient Position: Sitting, BP Cuff Size: Adult)   Pulse 86   Temp 36.3 °C (97.3 °F)         Assessment/Plan   Problem List Items Addressed This Visit    None  Visit Diagnoses       Abnormal EKG        Relevant Orders    Echocardiogram Stress Test

## 2025-01-21 DIAGNOSIS — F31.81 BIPOLAR II DISORDER (MULTI): ICD-10-CM

## 2025-01-21 RX ORDER — LAMOTRIGINE 200 MG/1
200 TABLET ORAL DAILY
Qty: 30 TABLET | Refills: 0 | Status: SHIPPED | OUTPATIENT
Start: 2025-01-21 | End: 2025-02-20

## 2025-01-21 RX ORDER — MIRTAZAPINE 7.5 MG/1
7.5 TABLET, FILM COATED ORAL NIGHTLY
Qty: 30 TABLET | Refills: 0 | Status: SHIPPED | OUTPATIENT
Start: 2025-01-21 | End: 2025-02-20

## 2025-01-21 RX ORDER — LITHIUM CARBONATE 300 MG/1
300 TABLET, FILM COATED, EXTENDED RELEASE ORAL DAILY
Qty: 30 TABLET | Refills: 0 | Status: SHIPPED | OUTPATIENT
Start: 2025-01-21 | End: 2025-02-20

## 2025-01-28 DIAGNOSIS — J32.8 OTHER CHRONIC SINUSITIS: ICD-10-CM

## 2025-01-28 DIAGNOSIS — J45.40 MODERATE PERSISTENT ASTHMA WITHOUT COMPLICATION (HHS-HCC): ICD-10-CM

## 2025-01-28 DIAGNOSIS — J30.89 ALLERGIC RHINITIS DUE TO MOLD: ICD-10-CM

## 2025-01-29 DIAGNOSIS — J45.40 MODERATE PERSISTENT ASTHMA WITHOUT COMPLICATION (HHS-HCC): ICD-10-CM

## 2025-01-29 DIAGNOSIS — J30.89 ALLERGIC RHINITIS DUE TO MOLD: ICD-10-CM

## 2025-01-29 DIAGNOSIS — J32.8 OTHER CHRONIC SINUSITIS: ICD-10-CM

## 2025-01-29 RX ORDER — OMEPRAZOLE 20 MG/1
20 CAPSULE, DELAYED RELEASE ORAL
Qty: 90 CAPSULE | Refills: 3 | Status: SHIPPED | OUTPATIENT
Start: 2025-01-29

## 2025-01-29 RX ORDER — OMEPRAZOLE 20 MG/1
20 CAPSULE, DELAYED RELEASE ORAL
Qty: 90 CAPSULE | Refills: 3 | Status: SHIPPED | OUTPATIENT
Start: 2025-01-29 | End: 2025-01-29 | Stop reason: SDUPTHER

## 2025-01-29 NOTE — PROGRESS NOTES
OMEPRAZOLE 20MG DAILY ON EMPTY STOMACH  REFILLED    Subjective   Patient ID:   [id number]   Missy Patton is a 69 y.o. female who presents for No chief complaint on file..    No chief complaint on file.         HPI  This patient is here to evaluate for:      Review of Systems      Objective     There were no vitals taken for this visit.     Physical Exam       Current Outpatient Medications   Medication Sig Dispense Refill    albuterol 2.5 mg /3 mL (0.083 %) nebulizer solution Take 3 mL (2.5 mg) by nebulization every 4 hours if needed for wheezing or shortness of breath. 75 mL 5    albuterol 90 mcg/actuation inhaler Inhale 2 puffs every 4 hours if needed for wheezing or shortness of breath. Every 4 to 6 hours as needed for shortness of breath and wheezng 18 g 5    azithromycin (Zithromax) 250 mg tablet Take 1 tablet (250 mg) by mouth once daily. 6 tablet 0    cholecalciferol (Vitamin D-3) 125 MCG (5000 UT) capsule Take 1 capsule (125 mcg) by mouth once daily.      ciclopirox (Loprox) 0.77 % gel Apply 1 Application topically 2 times a day.      denosumab (Prolia) 60 mg/mL syringe Inject 1 mL (60 mg total) under the skin every 6 months. 1 mL 1    estradiol (Estrace) 0.01 % (0.1 mg/gram) vaginal cream Insert 0.25 Applicatorfuls (1 g) into the vagina every other day. 45 g 3    lamoTRIgine (LaMICtal) 200 mg tablet Take 1 tablet (200 mg) by mouth once daily. 30 tablet 0    levocetirizine (Xyzal) 5 mg tablet Take 1 tablet (5 mg) by mouth once daily in the evening. 30 tablet 11    lipase-protease-amylase 9,000-112,500- 112,500 unit capsule Take by mouth.      lisdexamfetamine (Vyvanse) 60 mg capsule Take 1 capsule (60 mg) by mouth once daily in the morning. Do not fill before January 27, 2025. 30 capsule 0    lithium ER (Lithobid) 300 mg 12 hr tablet Take 1 tablet (300 mg) by mouth once daily. 30 tablet 0    metoprolol succinate XL (Toprol-XL) 25 mg 24 hr tablet Take 0.5 tablets (12.5 mg) by mouth once daily. 90 tablet  1    mirtazapine (Remeron) 7.5 mg tablet Take 1 tablet (7.5 mg) by mouth once daily at bedtime. 30 tablet 0    mometasone-formoterol (Dulera) 200-5 mcg/actuation inhaler Inhale 2 puffs 2 times a day. Rinse mouth afterwards. Use with spacer 13 g 11    omeprazole (PriLOSEC) 20 mg DR capsule Take 1 capsule (20 mg) by mouth once daily in the morning. Take before meals. Every evening 30 minutes before dinner on an empty stomach 90 capsule 3    Vyvanse 60 mg capsule Take 1 capsule (60 mg) by mouth once daily in the morning. 30 capsule 0    Vyvanse 60 mg capsule Take 1 capsule (60 mg) by mouth once daily in the morning. Do not fill before November 6, 2024. 30 capsule 0    Vyvanse 60 mg capsule Take 1 capsule (60 mg) by mouth once daily in the morning. Do not fill before December 6, 2024. 30 capsule 0     Current Facility-Administered Medications   Medication Dose Route Frequency Provider Last Rate Last Admin    ciclopirox (Penlac) 8 % solution   Topical Once Stephen Myers MD        denosumab (Prolia) injection 60 mg  60 mg subcutaneous q6 months Sydnee Walker MD   60 mg at 07/15/24 1446       Summary of the labs over the past 6 months:    Lab on 12/30/2024   Component Date Value Ref Range Status    WBC 12/30/2024 10.1  4.4 - 11.3 x10*3/uL Final    nRBC 12/30/2024 0.0  0.0 - 0.0 /100 WBCs Final    RBC 12/30/2024 4.72  4.00 - 5.20 x10*6/uL Final    Hemoglobin 12/30/2024 13.5  12.0 - 16.0 g/dL Final    Hematocrit 12/30/2024 42.9  36.0 - 46.0 % Final    MCV 12/30/2024 91  80 - 100 fL Final    MCH 12/30/2024 28.6  26.0 - 34.0 pg Final    MCHC 12/30/2024 31.5 (L)  32.0 - 36.0 g/dL Final    RDW 12/30/2024 13.2  11.5 - 14.5 % Final    Platelets 12/30/2024 334  150 - 450 x10*3/uL Final    Glucose 12/30/2024 117 (H)  74 - 99 mg/dL Final    Sodium 12/30/2024 138  136 - 145 mmol/L Final    Potassium 12/30/2024 4.0  3.5 - 5.3 mmol/L Final    Chloride 12/30/2024 104  98 - 107 mmol/L Final    Bicarbonate 12/30/2024 22  21 - 32  mmol/L Final    Anion Gap 12/30/2024 16  10 - 20 mmol/L Final    Urea Nitrogen 12/30/2024 23  6 - 23 mg/dL Final    Creatinine 12/30/2024 1.19 (H)  0.50 - 1.05 mg/dL Final    eGFR 12/30/2024 50 (L)  >60 mL/min/1.73m*2 Final    Calcium 12/30/2024 9.5  8.6 - 10.6 mg/dL Final    Albumin 12/30/2024 4.6  3.4 - 5.0 g/dL Final    Alkaline Phosphatase 12/30/2024 77  33 - 136 U/L Final    Total Protein 12/30/2024 7.0  6.4 - 8.2 g/dL Final    AST 12/30/2024 22  9 - 39 U/L Final    Bilirubin, Total 12/30/2024 0.3  0.0 - 1.2 mg/dL Final    ALT 12/30/2024 20  7 - 45 U/L Final    Protime 12/30/2024 11.4  9.8 - 12.8 seconds Final    INR 12/30/2024 1.0  0.9 - 1.1 Final    aPTT 12/30/2024 33  27 - 38 seconds Final   Office Visit on 12/24/2024   Component Date Value Ref Range Status    Binax NOW Covid Serial Number 12/24/2024 0   Final    BINAX NOW Covid Expiration 12/24/2024 0   Final    POC NACHO-COV-2 AG 12/24/2024 Positive test for SARS-CoV-2 (antigen detected) (A)  Presumptive negative test for SARS-CoV-2 (no antigen detected) Corrected   Lab on 08/12/2024   Component Date Value Ref Range Status    Color, Urine 08/12/2024 Light-Yellow  Light-Yellow, Yellow, Dark-Yellow Final    Appearance, Urine 08/12/2024 Turbid (N)  Clear Final    Specific Gravity, Urine 08/12/2024 1.016  1.005 - 1.035 Final    pH, Urine 08/12/2024 6.0  5.0, 5.5, 6.0, 6.5, 7.0, 7.5, 8.0 Final    Protein, Urine 08/12/2024 10 (TRACE)  NEGATIVE, 10 (TRACE), 20 (TRACE) mg/dL Final    Glucose, Urine 08/12/2024 Normal  Normal mg/dL Final    Blood, Urine 08/12/2024 0.1 (1+) (A)  NEGATIVE Final    Ketones, Urine 08/12/2024 NEGATIVE  NEGATIVE mg/dL Final    Bilirubin, Urine 08/12/2024 NEGATIVE  NEGATIVE Final    Urobilinogen, Urine 08/12/2024 Normal  Normal mg/dL Final    Nitrite, Urine 08/12/2024 NEGATIVE  NEGATIVE Final    Leukocyte Esterase, Urine 08/12/2024 500 Eloy/µL (A)  NEGATIVE Final    WBC, Urine 08/12/2024 >50 (A)  1-5, NONE /HPF Final    RBC, Urine 08/12/2024  >20 (A)  NONE, 1-2, 3-5 /HPF Final    Squamous Epithelial Cells, Urine 08/12/2024 1-9 (SPARSE)  Reference range not established. /HPF Final         Assessment/Plan           Sebastián Jhonson MD

## 2025-02-10 ENCOUNTER — TELEPHONE (OUTPATIENT)
Dept: ENDOCRINOLOGY | Facility: CLINIC | Age: 70
End: 2025-02-10
Payer: MEDICARE

## 2025-02-10 DIAGNOSIS — E55.9 VITAMIN D DEFICIENCY: ICD-10-CM

## 2025-02-10 DIAGNOSIS — M81.0 OSTEOPOROSIS, UNSPECIFIED OSTEOPOROSIS TYPE, UNSPECIFIED PATHOLOGICAL FRACTURE PRESENCE: Primary | ICD-10-CM

## 2025-02-12 ENCOUNTER — APPOINTMENT (OUTPATIENT)
Dept: ENDOCRINOLOGY | Facility: CLINIC | Age: 70
End: 2025-02-12
Payer: MEDICARE

## 2025-02-12 DIAGNOSIS — M81.0 OSTEOPOROSIS, UNSPECIFIED OSTEOPOROSIS TYPE, UNSPECIFIED PATHOLOGICAL FRACTURE PRESENCE: Primary | ICD-10-CM

## 2025-02-17 ENCOUNTER — APPOINTMENT (OUTPATIENT)
Dept: BEHAVIORAL HEALTH | Facility: CLINIC | Age: 70
End: 2025-02-17
Payer: MEDICARE

## 2025-02-17 ENCOUNTER — CLINICAL SUPPORT (OUTPATIENT)
Dept: ENDOCRINOLOGY | Facility: CLINIC | Age: 70
End: 2025-02-17
Payer: MEDICARE

## 2025-02-17 DIAGNOSIS — G47.9 SLEEP DIFFICULTIES: ICD-10-CM

## 2025-02-17 DIAGNOSIS — M81.0 OSTEOPOROSIS, UNSPECIFIED OSTEOPOROSIS TYPE, UNSPECIFIED PATHOLOGICAL FRACTURE PRESENCE: Primary | ICD-10-CM

## 2025-02-17 DIAGNOSIS — F90.2 ATTENTION DEFICIT HYPERACTIVITY DISORDER (ADHD), COMBINED TYPE: Primary | ICD-10-CM

## 2025-02-17 DIAGNOSIS — F43.10 COMPLEX POSTTRAUMATIC STRESS DISORDER: ICD-10-CM

## 2025-02-17 DIAGNOSIS — F31.81 BIPOLAR II DISORDER (MULTI): ICD-10-CM

## 2025-02-17 DIAGNOSIS — F41.1 GAD (GENERALIZED ANXIETY DISORDER): ICD-10-CM

## 2025-02-17 PROCEDURE — 96372 THER/PROPH/DIAG INJ SC/IM: CPT | Performed by: INTERNAL MEDICINE

## 2025-02-17 PROCEDURE — 1123F ACP DISCUSS/DSCN MKR DOCD: CPT | Performed by: NURSE PRACTITIONER

## 2025-02-17 PROCEDURE — 1036F TOBACCO NON-USER: CPT | Performed by: NURSE PRACTITIONER

## 2025-02-17 PROCEDURE — 90792 PSYCH DIAG EVAL W/MED SRVCS: CPT | Performed by: NURSE PRACTITIONER

## 2025-02-17 PROCEDURE — 1159F MED LIST DOCD IN RCRD: CPT | Performed by: NURSE PRACTITIONER

## 2025-02-17 PROCEDURE — 1160F RVW MEDS BY RX/DR IN RCRD: CPT | Performed by: NURSE PRACTITIONER

## 2025-02-17 RX ORDER — LITHIUM CARBONATE 300 MG/1
300 TABLET, FILM COATED, EXTENDED RELEASE ORAL DAILY
Qty: 90 TABLET | Refills: 3 | Status: SHIPPED | OUTPATIENT
Start: 2025-02-17 | End: 2026-02-17

## 2025-02-17 RX ORDER — MIRTAZAPINE 7.5 MG/1
7.5 TABLET, FILM COATED ORAL NIGHTLY
Qty: 90 TABLET | Refills: 3 | Status: SHIPPED | OUTPATIENT
Start: 2025-02-17 | End: 2026-02-17

## 2025-02-17 RX ORDER — LAMOTRIGINE 200 MG/1
200 TABLET ORAL DAILY
Qty: 90 TABLET | Refills: 3 | Status: SHIPPED | OUTPATIENT
Start: 2025-02-17 | End: 2026-02-17

## 2025-02-17 ASSESSMENT — ANXIETY QUESTIONNAIRES
6. BECOMING EASILY ANNOYED OR IRRITABLE: NOT AT ALL
IF YOU CHECKED OFF ANY PROBLEMS ON THIS QUESTIONNAIRE, HOW DIFFICULT HAVE THESE PROBLEMS MADE IT FOR YOU TO DO YOUR WORK, TAKE CARE OF THINGS AT HOME, OR GET ALONG WITH OTHER PEOPLE: NOT DIFFICULT AT ALL
1. FEELING NERVOUS, ANXIOUS, OR ON EDGE: SEVERAL DAYS
7. FEELING AFRAID AS IF SOMETHING AWFUL MIGHT HAPPEN: NOT AT ALL
GAD7 TOTAL SCORE: 1
4. TROUBLE RELAXING: NOT AT ALL
5. BEING SO RESTLESS THAT IT IS HARD TO SIT STILL: NOT AT ALL
3. WORRYING TOO MUCH ABOUT DIFFERENT THINGS: NOT AT ALL
2. NOT BEING ABLE TO STOP OR CONTROL WORRYING: NOT AT ALL

## 2025-02-17 ASSESSMENT — PATIENT HEALTH QUESTIONNAIRE - PHQ9
6. FEELING BAD ABOUT YOURSELF - OR THAT YOU ARE A FAILURE OR HAVE LET YOURSELF OR YOUR FAMILY DOWN: NOT AT ALL
7. TROUBLE CONCENTRATING ON THINGS, SUCH AS READING THE NEWSPAPER OR WATCHING TELEVISION: NOT AT ALL
5. POOR APPETITE OR OVEREATING: NOT AT ALL
9. THOUGHTS THAT YOU WOULD BE BETTER OFF DEAD, OR OF HURTING YOURSELF: NOT AT ALL
1. LITTLE INTEREST OR PLEASURE IN DOING THINGS: NOT AT ALL
3. TROUBLE FALLING OR STAYING ASLEEP OR SLEEPING TOO MUCH: NOT AT ALL
2. FEELING DOWN, DEPRESSED OR HOPELESS: NOT AT ALL
4. FEELING TIRED OR HAVING LITTLE ENERGY: SEVERAL DAYS
8. MOVING OR SPEAKING SO SLOWLY THAT OTHER PEOPLE COULD HAVE NOTICED. OR THE OPPOSITE, BEING SO FIGETY OR RESTLESS THAT YOU HAVE BEEN MOVING AROUND A LOT MORE THAN USUAL: NOT AT ALL
10. IF YOU CHECKED OFF ANY PROBLEMS, HOW DIFFICULT HAVE THESE PROBLEMS MADE IT FOR YOU TO DO YOUR WORK, TAKE CARE OF THINGS AT HOME, OR GET ALONG WITH OTHER PEOPLE: NOT DIFFICULT AT ALL

## 2025-02-17 NOTE — PROGRESS NOTES
Pt is here for prolia injection 60mg  lot [id number] exp 06/30/2027 given in left arm subcutaneous. Patient supplies own medication.
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
## 2025-02-18 ENCOUNTER — HOSPITAL ENCOUNTER (OUTPATIENT)
Dept: CARDIOLOGY | Facility: HOSPITAL | Age: 70
Discharge: HOME | End: 2025-02-18
Payer: MEDICARE

## 2025-02-18 DIAGNOSIS — R94.31 ABNORMAL EKG: ICD-10-CM

## 2025-02-18 LAB
25(OH)D3+25(OH)D2 SERPL-MCNC: 30 NG/ML (ref 30–100)
ALBUMIN SERPL-MCNC: 4.6 G/DL (ref 3.6–5.1)
ALP SERPL-CCNC: 86 U/L (ref 37–153)
ALT SERPL-CCNC: 49 U/L (ref 6–29)
ANION GAP SERPL CALCULATED.4IONS-SCNC: 7 MMOL/L (CALC) (ref 7–17)
AST SERPL-CCNC: 56 U/L (ref 10–35)
BILIRUB SERPL-MCNC: 0.4 MG/DL (ref 0.2–1.2)
BUN SERPL-MCNC: 17 MG/DL (ref 7–25)
CALCIUM SERPL-MCNC: 9.8 MG/DL (ref 8.6–10.4)
CHLORIDE SERPL-SCNC: 102 MMOL/L (ref 98–110)
CO2 SERPL-SCNC: 30 MMOL/L (ref 20–32)
CREAT SERPL-MCNC: 1.04 MG/DL (ref 0.5–1.05)
EGFRCR SERPLBLD CKD-EPI 2021: 59 ML/MIN/1.73M2
GLUCOSE SERPL-MCNC: 94 MG/DL (ref 65–99)
POTASSIUM SERPL-SCNC: 3.8 MMOL/L (ref 3.5–5.3)
PROT SERPL-MCNC: 6.8 G/DL (ref 6.1–8.1)
SODIUM SERPL-SCNC: 139 MMOL/L (ref 135–146)

## 2025-02-18 PROCEDURE — 93325 DOPPLER ECHO COLOR FLOW MAPG: CPT | Performed by: STUDENT IN AN ORGANIZED HEALTH CARE EDUCATION/TRAINING PROGRAM

## 2025-02-18 PROCEDURE — 93018 CV STRESS TEST I&R ONLY: CPT | Performed by: STUDENT IN AN ORGANIZED HEALTH CARE EDUCATION/TRAINING PROGRAM

## 2025-02-18 PROCEDURE — 93350 STRESS TTE ONLY: CPT | Performed by: STUDENT IN AN ORGANIZED HEALTH CARE EDUCATION/TRAINING PROGRAM

## 2025-02-18 PROCEDURE — 93325 DOPPLER ECHO COLOR FLOW MAPG: CPT

## 2025-02-18 PROCEDURE — 93016 CV STRESS TEST SUPVJ ONLY: CPT | Performed by: STUDENT IN AN ORGANIZED HEALTH CARE EDUCATION/TRAINING PROGRAM

## 2025-02-18 PROCEDURE — 93321 DOPPLER ECHO F-UP/LMTD STD: CPT | Performed by: STUDENT IN AN ORGANIZED HEALTH CARE EDUCATION/TRAINING PROGRAM

## 2025-02-24 DIAGNOSIS — F90.2 ATTENTION DEFICIT HYPERACTIVITY DISORDER (ADHD), COMBINED TYPE: ICD-10-CM

## 2025-02-24 DIAGNOSIS — F31.81 BIPOLAR II DISORDER (MULTI): ICD-10-CM

## 2025-02-28 ENCOUNTER — TELEPHONE (OUTPATIENT)
Dept: PRIMARY CARE | Facility: CLINIC | Age: 70
End: 2025-02-28
Payer: MEDICARE

## 2025-02-28 DIAGNOSIS — B36.0 TINEA VERSICOLOR: Primary | ICD-10-CM

## 2025-02-28 NOTE — TELEPHONE ENCOUNTER
Pt called and said that she has a yeast infection all over her chest and in her cleavage. Pt wanted to go and get something over the counter and she wants to know what you recommend. Pt says it started Wednesday she states it itches bad and it hurts.

## 2025-03-03 ENCOUNTER — APPOINTMENT (OUTPATIENT)
Dept: ALLERGY | Facility: CLINIC | Age: 70
End: 2025-03-03
Payer: MEDICARE

## 2025-03-03 VITALS
WEIGHT: 140.6 LBS | DIASTOLIC BLOOD PRESSURE: 112 MMHG | BODY MASS INDEX: 28.4 KG/M2 | SYSTOLIC BLOOD PRESSURE: 155 MMHG | HEART RATE: 83 BPM

## 2025-03-03 DIAGNOSIS — J32.8 OTHER CHRONIC SINUSITIS: ICD-10-CM

## 2025-03-03 DIAGNOSIS — J45.40 MODERATE PERSISTENT ASTHMA WITHOUT COMPLICATION (HHS-HCC): Primary | ICD-10-CM

## 2025-03-03 DIAGNOSIS — J30.89 ALLERGIC RHINITIS DUE TO MOLD: ICD-10-CM

## 2025-03-03 PROCEDURE — 1123F ACP DISCUSS/DSCN MKR DOCD: CPT | Performed by: ALLERGY & IMMUNOLOGY

## 2025-03-03 PROCEDURE — 99214 OFFICE O/P EST MOD 30 MIN: CPT | Performed by: ALLERGY & IMMUNOLOGY

## 2025-03-03 PROCEDURE — 3077F SYST BP >= 140 MM HG: CPT | Performed by: ALLERGY & IMMUNOLOGY

## 2025-03-03 PROCEDURE — 3080F DIAST BP >= 90 MM HG: CPT | Performed by: ALLERGY & IMMUNOLOGY

## 2025-03-03 PROCEDURE — G2211 COMPLEX E/M VISIT ADD ON: HCPCS | Performed by: ALLERGY & IMMUNOLOGY

## 2025-03-03 RX ORDER — ALBUTEROL SULFATE 90 UG/1
2 INHALANT RESPIRATORY (INHALATION) EVERY 4 HOURS PRN
Qty: 18 G | Refills: 5 | Status: SHIPPED | OUTPATIENT
Start: 2025-03-03

## 2025-03-03 RX ORDER — BUDESONIDE AND FORMOTEROL FUMARATE DIHYDRATE 160; 4.5 UG/1; UG/1
2 AEROSOL RESPIRATORY (INHALATION)
Qty: 10.2 G | Refills: 11 | Status: SHIPPED | OUTPATIENT
Start: 2025-03-03 | End: 2026-03-03

## 2025-03-03 RX ORDER — BUTENAFINE HYDROCHLORIDE 10 MG/G
1 CREAM TOPICAL DAILY
Qty: 30 G | Refills: 0 | Status: SHIPPED | OUTPATIENT
Start: 2025-03-03 | End: 2025-04-02

## 2025-03-03 NOTE — PROGRESS NOTES
Subjective   Patient ID:   [id number]   Missy Patton is a 69 y.o. female who presents for Asthma (Here for asthma FUV and med refill. Concerned she may have thrush.  Wants to talk about possibly changing to a less expensive inhaler. ).    Chief Complaint   Patient presents with    Asthma     Here for asthma FUV and med refill. Concerned she may have thrush.  Wants to talk about possibly changing to a less expensive inhaler.           Asthma  Her past medical history is significant for asthma.     This patient is here to evaluate for:  Asthma and allergic rhinitis and conjunctivitis     Dulera - has been used for years but it is expensive.   This patient reports albuterol usage:    Last use:  Symptoms do improve with the albuterol:   Albuterol used for the following symptoms:  No recent albuterol nebs but has if she had a bad asthma exacerbation.    Asthma exacerbation - last time Feb 2024 ( a year ago) while in [place]; tested negative for covid and flu  6 weeks of antibiotic and needed albuterol.      Review of Systems   All other systems reviewed and are negative.        Objective     BP (!) 155/112 (BP Location: Left arm, Patient Position: Sitting)   Pulse 83   Wt 63.8 kg (140 lb 9.6 oz)   BMI 28.40 kg/m²      Physical Exam  Constitutional:       General: She is not in acute distress.     Appearance: Normal appearance. She is not ill-appearing.   HENT:      Head: Normocephalic and atraumatic.      Right Ear: Tympanic membrane, ear canal and external ear normal.      Left Ear: Tympanic membrane, ear canal and external ear normal.      Nose: Nose normal. No congestion or rhinorrhea.      Mouth/Throat:      Mouth: Mucous membranes are moist.      Pharynx: Oropharynx is clear. No oropharyngeal exudate or posterior oropharyngeal erythema.      Comments: No thrush; mild plaque on tongue  Eyes:      General:         Right eye: No discharge.         Left eye: No discharge.      Conjunctiva/sclera: Conjunctivae  normal.   Cardiovascular:      Rate and Rhythm: Normal rate and regular rhythm.      Heart sounds: Normal heart sounds. No murmur heard.     No friction rub. No gallop.   Pulmonary:      Effort: Pulmonary effort is normal. No respiratory distress.      Breath sounds: Normal breath sounds. No stridor. No wheezing, rhonchi or rales.   Chest:      Chest wall: No tenderness.   Abdominal:      General: Abdomen is flat.      Palpations: Abdomen is soft.   Musculoskeletal:         General: Normal range of motion.      Cervical back: Normal range of motion and neck supple.   Lymphadenopathy:      Cervical: No cervical adenopathy.   Skin:     General: Skin is warm and dry.      Findings: No erythema, lesion or rash.   Neurological:      General: No focal deficit present.      Mental Status: She is alert. Mental status is at baseline.   Psychiatric:         Mood and Affect: Mood normal.         Behavior: Behavior normal.         Thought Content: Thought content normal.         Judgment: Judgment normal.            Current Outpatient Medications   Medication Sig Dispense Refill    albuterol 2.5 mg /3 mL (0.083 %) nebulizer solution Take 3 mL (2.5 mg) by nebulization every 4 hours if needed for wheezing or shortness of breath. 75 mL 5    albuterol 90 mcg/actuation inhaler Inhale 2 puffs every 4 hours if needed for wheezing or shortness of breath. Every 4 to 6 hours as needed for shortness of breath and wheezng 18 g 5    butenafine 1 % cream Apply 1 Application topically once daily. For two weeks 30 g 0    cholecalciferol (Vitamin D-3) 125 MCG (5000 UT) capsule Take 1 capsule (125 mcg) by mouth once daily.      ciclopirox (Loprox) 0.77 % gel Apply 1 Application topically 2 times a day.      denosumab (Prolia) 60 mg/mL syringe Inject 1 mL (60 mg total) under the skin every 6 months. 1 mL 1    estradiol (Estrace) 0.01 % (0.1 mg/gram) vaginal cream Insert 0.25 Applicatorfuls (1 g) into the vagina every other day. 45 g 3     lamoTRIgine (LaMICtal) 200 mg tablet Take 1 tablet (200 mg) by mouth once daily. 90 tablet 3    levocetirizine (Xyzal) 5 mg tablet Take 1 tablet (5 mg) by mouth once daily in the evening. 30 tablet 11    lipase-protease-amylase 9,000-112,500- 112,500 unit capsule Take by mouth.      lisdexamfetamine (Vyvanse) 60 mg capsule Take 1 capsule (60 mg) by mouth once daily in the morning. Do not fill before January 27, 2025. 30 capsule 0    lithium ER (Lithobid) 300 mg 12 hr tablet Take 1 tablet (300 mg) by mouth once daily. 90 tablet 3    metoprolol succinate XL (Toprol-XL) 25 mg 24 hr tablet Take 0.5 tablets (12.5 mg) by mouth once daily. 90 tablet 1    mirtazapine (Remeron) 7.5 mg tablet Take 1 tablet (7.5 mg) by mouth once daily at bedtime. 90 tablet 3    mometasone-formoterol (Dulera) 200-5 mcg/actuation inhaler Inhale 2 puffs 2 times a day. Rinse mouth afterwards. Use with spacer 13 g 11    omeprazole (PriLOSEC) 20 mg DR capsule Take 1 capsule (20 mg) by mouth once daily in the morning. Take before meals. 90 capsule 3     Current Facility-Administered Medications   Medication Dose Route Frequency Provider Last Rate Last Admin    ciclopirox (Penlac) 8 % solution   Topical Once Stephen Myers MD        denosumab (Prolia) injection 60 mg  60 mg subcutaneous q6 months Sydnee Walker MD   60 mg at 07/15/24 1446       Summary of the labs over the past 6 months:    Telephone on 02/10/2025   Component Date Value Ref Range Status    GLUCOSE 02/17/2025 94  65 - 99 mg/dL Final    UREA NITROGEN (BUN) 02/17/2025 17  7 - 25 mg/dL Final    CREATININE 02/17/2025 1.04  0.50 - 1.05 mg/dL Final    EGFR 02/17/2025 59 (L)  > OR = 60 mL/min/1.73m2 Final    SODIUM 02/17/2025 139  135 - 146 mmol/L Final    POTASSIUM 02/17/2025 3.8  3.5 - 5.3 mmol/L Final    CHLORIDE 02/17/2025 102  98 - 110 mmol/L Final    CARBON DIOXIDE 02/17/2025 30  20 - 32 mmol/L Final    ELECTROLYTE BALANCE 02/17/2025 7  7 - 17 mmol/L (calc) Final    CALCIUM  02/17/2025 9.8  8.6 - 10.4 mg/dL Final    PROTEIN, TOTAL 02/17/2025 6.8  6.1 - 8.1 g/dL Final    ALBUMIN 02/17/2025 4.6  3.6 - 5.1 g/dL Final    BILIRUBIN, TOTAL 02/17/2025 0.4  0.2 - 1.2 mg/dL Final    ALKALINE PHOSPHATASE 02/17/2025 86  37 - 153 U/L Final    AST 02/17/2025 56 (H)  10 - 35 U/L Final    ALT 02/17/2025 49 (H)  6 - 29 U/L Final    VITAMIN D,25-OH,TOTAL,IA 02/17/2025 30  30 - 100 ng/mL Final   Lab on 12/30/2024   Component Date Value Ref Range Status    WBC 12/30/2024 10.1  4.4 - 11.3 x10*3/uL Final    nRBC 12/30/2024 0.0  0.0 - 0.0 /100 WBCs Final    RBC 12/30/2024 4.72  4.00 - 5.20 x10*6/uL Final    Hemoglobin 12/30/2024 13.5  12.0 - 16.0 g/dL Final    Hematocrit 12/30/2024 42.9  36.0 - 46.0 % Final    MCV 12/30/2024 91  80 - 100 fL Final    MCH 12/30/2024 28.6  26.0 - 34.0 pg Final    MCHC 12/30/2024 31.5 (L)  32.0 - 36.0 g/dL Final    RDW 12/30/2024 13.2  11.5 - 14.5 % Final    Platelets 12/30/2024 334  150 - 450 x10*3/uL Final    Glucose 12/30/2024 117 (H)  74 - 99 mg/dL Final    Sodium 12/30/2024 138  136 - 145 mmol/L Final    Potassium 12/30/2024 4.0  3.5 - 5.3 mmol/L Final    Chloride 12/30/2024 104  98 - 107 mmol/L Final    Bicarbonate 12/30/2024 22  21 - 32 mmol/L Final    Anion Gap 12/30/2024 16  10 - 20 mmol/L Final    Urea Nitrogen 12/30/2024 23  6 - 23 mg/dL Final    Creatinine 12/30/2024 1.19 (H)  0.50 - 1.05 mg/dL Final    eGFR 12/30/2024 50 (L)  >60 mL/min/1.73m*2 Final    Calcium 12/30/2024 9.5  8.6 - 10.6 mg/dL Final    Albumin 12/30/2024 4.6  3.4 - 5.0 g/dL Final    Alkaline Phosphatase 12/30/2024 77  33 - 136 U/L Final    Total Protein 12/30/2024 7.0  6.4 - 8.2 g/dL Final    AST 12/30/2024 22  9 - 39 U/L Final    Bilirubin, Total 12/30/2024 0.3  0.0 - 1.2 mg/dL Final    ALT 12/30/2024 20  7 - 45 U/L Final    Protime 12/30/2024 11.4  9.8 - 12.8 seconds Final    INR 12/30/2024 1.0  0.9 - 1.1 Final    aPTT 12/30/2024 33  27 - 38 seconds Final   Office Visit on 12/24/2024   Component  Date Value Ref Range Status    Binax NOW Covid Serial Number 12/24/2024 0   Final    BINAX NOW Covid Expiration 12/24/2024 0   Final    POC NACHO-COV-2 AG 12/24/2024 Positive test for SARS-CoV-2 (antigen detected) (A)  Presumptive negative test for SARS-CoV-2 (no antigen detected) Corrected     Narrative & Impression   Interpreted By:  Emely Mendoza,   STUDY:  XR CHEST 2 VIEWS;  1/6/2025 12:08 pm      INDICATION:  Signs/Symptoms:Cough.      ,R05.1 Acute cough      COMPARISON:  Chest x-ray 05/22/2024. cardiac CT 09/10/2021, CT abdomen and pelvis  02/28/2029.      ACCESSION NUMBER(S):  [id number]      ORDERING CLINICIAN:  MASSIMO CALLES      FINDINGS:  PA and lateral radiographs of the chest were provided.              CARDIOMEDIASTINAL SILHOUETTE:  Cardiomediastinal silhouette is normal in size and configuration.  Aortic arch atherosclerotic calcifications noted.      LUNGS:  Central hilar vasculature and interstitial lung markings are within  normal limits. Previously seen lingular opacity has significantly  improved, however there is a residual small hazy ground-glass opacity  in the left lung base adjacent to the cardiac apex on the frontal  view. This likely represents a small epicardial fat pad and adjacent  chronic linear area of atelectasis or scarring in the lingula seen on  prior CTs. No focal consolidation or other airspace opacity is  otherwise seen throughout the remainder of the lungs. No pleural  effusion or pneumothorax.      ABDOMEN:  No remarkable upper abdominal findings.      BONES:  No acute bony abnormality. Mild multilevel thoracic spondylosis.      IMPRESSION:  1. No radiographic evidence of acute cardiopulmonary abnormality. If  this persistent clinical concern, a CT may be obtained.  2. Findings suggestive of a small pericardial fat pad and adjacent  chronic linear area of atelectasis in the lingula, as seen on prior  CTs.              MACRO:  None      Signed by: Emely Mendoza 1/8/2025 11:06  AM  Dictation workstation:   [id number]         Assessment/Plan   Diagnoses and all orders for this visit:  Moderate persistent asthma without complication ([facility])  -     budesonide-formoteroL (Symbicort) 160-4.5 mcg/actuation inhaler; Inhale 2 puffs 2 times a day. Rinse mouth with water after use to reduce aftertaste and incidence of candidiasis. Do not swallow.  -     albuterol 90 mcg/actuation inhaler; Inhale 2 puffs every 4 hours if needed for wheezing or shortness of breath. Every 4 to 6 hours as needed for shortness of breath and wheezng  Other chronic sinusitis  -     albuterol 90 mcg/actuation inhaler; Inhale 2 puffs every 4 hours if needed for wheezing or shortness of breath. Every 4 to 6 hours as needed for shortness of breath and wheezng  Allergic rhinitis due to mold  -     albuterol 90 mcg/actuation inhaler; Inhale 2 puffs every 4 hours if needed for wheezing or shortness of breath. Every 4 to 6 hours as needed for shortness of breath and wheezng    Nice to see you!    Due to cost, change from Dulera to Symbicort 160 - 2 puffs twice a day   The patient may use albuterol taking two puffs 15 minutes prior to exercise or every four hours as needed for cough, wheeze, or shortness of breath.     Follow-up in 1 year so we may re-assess you and develop a more long term plan.      Sebastián Johnson MD      is being coded for visit complexity inherent to evaluation and management associated with medical care services that serve as the continuing focal point for medical care services that are part of ongoing care related to this patient’s single, serious condition or a complex condition.

## 2025-03-04 LAB
AMPHET UR-MCNC: >4000 NG/ML
LITHIUM SERPL-SCNC: 0.5 MMOL/L (ref 0.6–1.2)
MDA UR-MCNC: NEGATIVE NG/ML
MDEA UR-MCNC: NEGATIVE NG/ML
MDMA UR-MCNC: NEGATIVE NG/ML
METHAMPHET UR-MCNC: NEGATIVE NG/ML
PHENTERMINE UR-MCNC: NEGATIVE NG/ML

## 2025-03-07 ENCOUNTER — TELEPHONE (OUTPATIENT)
Dept: BEHAVIORAL HEALTH | Facility: CLINIC | Age: 70
End: 2025-03-07
Payer: MEDICARE

## 2025-03-07 ENCOUNTER — PHARMACY VISIT (OUTPATIENT)
Dept: PHARMACY | Facility: CLINIC | Age: 70
End: 2025-03-07
Payer: COMMERCIAL

## 2025-03-07 DIAGNOSIS — F90.2 ATTENTION DEFICIT HYPERACTIVITY DISORDER (ADHD), COMBINED TYPE: Primary | ICD-10-CM

## 2025-03-07 DIAGNOSIS — F90.2 ATTENTION DEFICIT HYPERACTIVITY DISORDER (ADHD), COMBINED TYPE: ICD-10-CM

## 2025-03-07 PROCEDURE — RXMED WILLOW AMBULATORY MEDICATION CHARGE

## 2025-03-07 RX ORDER — LISDEXAMFETAMINE DIMESYLATE 60 MG/1
60 CAPSULE ORAL EVERY MORNING
Qty: 30 CAPSULE | Refills: 0 | Status: SHIPPED | OUTPATIENT
Start: 2025-03-07 | End: 2025-03-07

## 2025-03-07 RX ORDER — LISDEXAMFETAMINE DIMESYLATE 60 MG/1
60 CAPSULE ORAL EVERY MORNING
Qty: 30 CAPSULE | Refills: 0 | Status: SHIPPED | OUTPATIENT
Start: 2025-04-06 | End: 2025-05-06

## 2025-03-07 RX ORDER — LISDEXAMFETAMINE DIMESYLATE 60 MG/1
60 CAPSULE ORAL EVERY MORNING
Qty: 30 CAPSULE | Refills: 0 | Status: SHIPPED | OUTPATIENT
Start: 2025-05-06 | End: 2025-06-05

## 2025-03-07 RX ORDER — LISDEXAMFETAMINE DIMESYLATE 60 MG/1
60 CAPSULE ORAL EVERY MORNING
Qty: 30 CAPSULE | Refills: 0 | Status: SHIPPED | OUTPATIENT
Start: 2025-03-07 | End: 2025-04-06

## 2025-03-07 RX ORDER — LISDEXAMFETAMINE DIMESYLATE 60 MG/1
60 CAPSULE ORAL EVERY MORNING
Qty: 30 CAPSULE | Refills: 0 | Status: CANCELLED | OUTPATIENT
Start: 2025-03-07 | End: 2025-04-06

## 2025-03-07 NOTE — PROGRESS NOTES
Reviewed OARRS on 03/07/2025 by YASMIN Moy -OARRS has been reviewed and is consistent with prescribed medications, Considered the risks of abuse, dependence, addiction and diversion, Medication is felt to be clinically appropriate based on documented diagnosis

## 2025-03-10 RX ORDER — LISDEXAMFETAMINE DIMESYLATE 60 MG/1
60 CAPSULE ORAL EVERY MORNING
Qty: 30 CAPSULE | Refills: 0 | OUTPATIENT
Start: 2025-03-10 | End: 2025-04-09

## 2025-03-24 ENCOUNTER — TELEPHONE (OUTPATIENT)
Dept: ALLERGY | Facility: CLINIC | Age: 70
End: 2025-03-24
Payer: MEDICARE

## 2025-03-24 DIAGNOSIS — J45.40 MODERATE PERSISTENT ASTHMA WITHOUT COMPLICATION (HHS-HCC): ICD-10-CM

## 2025-03-24 RX ORDER — BUDESONIDE AND FORMOTEROL FUMARATE DIHYDRATE 160; 4.5 UG/1; UG/1
2 AEROSOL RESPIRATORY (INHALATION)
Qty: 10.2 G | Refills: 11 | Status: SHIPPED | OUTPATIENT
Start: 2025-03-24 | End: 2026-03-24

## 2025-03-26 RX ORDER — BUDESONIDE AND FORMOTEROL FUMARATE DIHYDRATE 160; 4.5 UG/1; UG/1
2 AEROSOL RESPIRATORY (INHALATION)
Qty: 10.2 G | Refills: 11 | Status: SHIPPED | OUTPATIENT
Start: 2025-03-26 | End: 2026-03-26

## 2025-04-08 ENCOUNTER — APPOINTMENT (OUTPATIENT)
Dept: BEHAVIORAL HEALTH | Facility: CLINIC | Age: 70
End: 2025-04-08
Payer: MEDICARE

## 2025-04-08 ENCOUNTER — PATIENT MESSAGE (OUTPATIENT)
Dept: BEHAVIORAL HEALTH | Facility: CLINIC | Age: 70
End: 2025-04-08

## 2025-04-08 VITALS
WEIGHT: 140.8 LBS | HEIGHT: 59 IN | SYSTOLIC BLOOD PRESSURE: 148 MMHG | BODY MASS INDEX: 28.39 KG/M2 | DIASTOLIC BLOOD PRESSURE: 89 MMHG | HEART RATE: 76 BPM

## 2025-04-08 DIAGNOSIS — F31.81 BIPOLAR II DISORDER (MULTI): ICD-10-CM

## 2025-04-08 DIAGNOSIS — F90.2 ATTENTION DEFICIT HYPERACTIVITY DISORDER (ADHD), COMBINED TYPE: ICD-10-CM

## 2025-04-08 DIAGNOSIS — F41.1 GAD (GENERALIZED ANXIETY DISORDER): Primary | ICD-10-CM

## 2025-04-08 DIAGNOSIS — F43.10 COMPLEX POSTTRAUMATIC STRESS DISORDER: ICD-10-CM

## 2025-04-08 PROCEDURE — 3079F DIAST BP 80-89 MM HG: CPT | Performed by: NURSE PRACTITIONER

## 2025-04-08 PROCEDURE — 1160F RVW MEDS BY RX/DR IN RCRD: CPT | Performed by: NURSE PRACTITIONER

## 2025-04-08 PROCEDURE — 1159F MED LIST DOCD IN RCRD: CPT | Performed by: NURSE PRACTITIONER

## 2025-04-08 PROCEDURE — 3008F BODY MASS INDEX DOCD: CPT | Performed by: NURSE PRACTITIONER

## 2025-04-08 PROCEDURE — 99214 OFFICE O/P EST MOD 30 MIN: CPT | Performed by: NURSE PRACTITIONER

## 2025-04-08 PROCEDURE — 3077F SYST BP >= 140 MM HG: CPT | Performed by: NURSE PRACTITIONER

## 2025-04-08 PROCEDURE — 1123F ACP DISCUSS/DSCN MKR DOCD: CPT | Performed by: NURSE PRACTITIONER

## 2025-04-08 ASSESSMENT — PATIENT HEALTH QUESTIONNAIRE - PHQ9
4. FEELING TIRED OR HAVING LITTLE ENERGY: NOT AT ALL
3. TROUBLE FALLING OR STAYING ASLEEP OR SLEEPING TOO MUCH: NOT AT ALL
1. LITTLE INTEREST OR PLEASURE IN DOING THINGS: NOT AT ALL
7. TROUBLE CONCENTRATING ON THINGS, SUCH AS READING THE NEWSPAPER OR WATCHING TELEVISION: NOT AT ALL
6. FEELING BAD ABOUT YOURSELF - OR THAT YOU ARE A FAILURE OR HAVE LET YOURSELF OR YOUR FAMILY DOWN: NOT AT ALL
9. THOUGHTS THAT YOU WOULD BE BETTER OFF DEAD, OR OF HURTING YOURSELF: NOT AT ALL
8. MOVING OR SPEAKING SO SLOWLY THAT OTHER PEOPLE COULD HAVE NOTICED. OR THE OPPOSITE, BEING SO FIGETY OR RESTLESS THAT YOU HAVE BEEN MOVING AROUND A LOT MORE THAN USUAL: NOT AT ALL
2. FEELING DOWN, DEPRESSED OR HOPELESS: NOT AT ALL
10. IF YOU CHECKED OFF ANY PROBLEMS, HOW DIFFICULT HAVE THESE PROBLEMS MADE IT FOR YOU TO DO YOUR WORK, TAKE CARE OF THINGS AT HOME, OR GET ALONG WITH OTHER PEOPLE: NOT DIFFICULT AT ALL
5. POOR APPETITE OR OVEREATING: NOT AT ALL

## 2025-04-08 ASSESSMENT — ANXIETY QUESTIONNAIRES
4. TROUBLE RELAXING: SEVERAL DAYS
GAD7 TOTAL SCORE: 4
2. NOT BEING ABLE TO STOP OR CONTROL WORRYING: NOT AT ALL
5. BEING SO RESTLESS THAT IT IS HARD TO SIT STILL: NOT AT ALL
7. FEELING AFRAID AS IF SOMETHING AWFUL MIGHT HAPPEN: NOT AT ALL
6. BECOMING EASILY ANNOYED OR IRRITABLE: SEVERAL DAYS
1. FEELING NERVOUS, ANXIOUS, OR ON EDGE: MORE THAN HALF THE DAYS
3. WORRYING TOO MUCH ABOUT DIFFERENT THINGS: NOT AT ALL
IF YOU CHECKED OFF ANY PROBLEMS ON THIS QUESTIONNAIRE, HOW DIFFICULT HAVE THESE PROBLEMS MADE IT FOR YOU TO DO YOUR WORK, TAKE CARE OF THINGS AT HOME, OR GET ALONG WITH OTHER PEOPLE: NOT DIFFICULT AT ALL
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
## 2025-04-09 PROCEDURE — RXMED WILLOW AMBULATORY MEDICATION CHARGE

## 2025-04-09 RX ORDER — LISDEXAMFETAMINE DIMESYLATE 60 MG/1
60 CAPSULE ORAL EVERY MORNING
Qty: 30 CAPSULE | Refills: 0 | Status: SHIPPED | OUTPATIENT
Start: 2025-04-09 | End: 2025-05-10

## 2025-04-10 ENCOUNTER — PHARMACY VISIT (OUTPATIENT)
Dept: PHARMACY | Facility: CLINIC | Age: 70
End: 2025-04-10
Payer: COMMERCIAL

## 2025-04-15 DIAGNOSIS — F31.81 BIPOLAR II DISORDER (MULTI): ICD-10-CM

## 2025-04-21 ENCOUNTER — APPOINTMENT (OUTPATIENT)
Dept: PRIMARY CARE | Facility: CLINIC | Age: 70
End: 2025-04-21
Payer: MEDICARE

## 2025-04-21 VITALS
OXYGEN SATURATION: 98 % | HEART RATE: 68 BPM | BODY MASS INDEX: 28.48 KG/M2 | WEIGHT: 141 LBS | DIASTOLIC BLOOD PRESSURE: 78 MMHG | TEMPERATURE: 98 F | SYSTOLIC BLOOD PRESSURE: 124 MMHG

## 2025-04-21 DIAGNOSIS — B88.2 INFESTATION BY INSECT: Primary | ICD-10-CM

## 2025-04-21 PROCEDURE — 3074F SYST BP LT 130 MM HG: CPT | Performed by: INTERNAL MEDICINE

## 2025-04-21 PROCEDURE — 99213 OFFICE O/P EST LOW 20 MIN: CPT | Performed by: INTERNAL MEDICINE

## 2025-04-21 PROCEDURE — 1036F TOBACCO NON-USER: CPT | Performed by: INTERNAL MEDICINE

## 2025-04-21 PROCEDURE — 3078F DIAST BP <80 MM HG: CPT | Performed by: INTERNAL MEDICINE

## 2025-04-21 PROCEDURE — 1159F MED LIST DOCD IN RCRD: CPT | Performed by: INTERNAL MEDICINE

## 2025-04-21 PROCEDURE — 1123F ACP DISCUSS/DSCN MKR DOCD: CPT | Performed by: INTERNAL MEDICINE

## 2025-04-21 ASSESSMENT — ENCOUNTER SYMPTOMS: SINUS COMPLAINT: 1

## 2025-04-21 NOTE — PROGRESS NOTES
Patient is here for moth infestation . Patient believe moth in her sinus, hair itching and legs. Patient has the moth serg in her home.

## 2025-04-21 NOTE — PROGRESS NOTES
Subjective   Patient ID: Missy Patton is a 69 y.o. female who presents for Insect Bite, Itching, Sinus Problem, and Facial Pain.    Ms. Patton is in for a possible moth infestation.  She states that this has been going on for approximately 3 weeks.  She said it started out with finding 3 small loss in the house and stated that the infestation may have started upstairs.  She has had a  out as well as done her own self mitigating measures.  She also states that her secretions have changed and in fact may have seen some parasites in her secretions.    Sinus Problem    Facial Pain        Review of Systems   All other systems reviewed and are negative.      Previous history  Medical History[1]  Surgical History[2]  Social History[3]  Family History[4]  Allergies[5]  Current Outpatient Medications   Medication Instructions    albuterol 90 mcg/actuation inhaler 2 puffs, inhalation, Every 4 hours PRN, Every 4 to 6 hours as needed for shortness of breath and wheezng    albuterol 2.5 mg, nebulization, Every 4 hours PRN    budesonide-formoterol (Symbicort) 160-4.5 mcg/actuation inhaler 2 puffs, inhalation, 2 times daily RT, Rinse mouth with water after use to reduce aftertaste and incidence of candidiasis. Do not swallow.    budesonide-formoterol (Symbicort) 160-4.5 mcg/actuation inhaler 2 puffs, inhalation, 2 times daily RT, Rinse mouth with water after use to reduce aftertaste and incidence of candidiasis. Do not swallow.    cholecalciferol (Vitamin D-3) 125 MCG (5000 UT) capsule 1 capsule, Daily    ciclopirox (Loprox) 0.77 % gel 1 Application, 2 times daily    estradiol (ESTRACE) 1 g, vaginal, Every other day    lamoTRIgine (LAMICTAL) 200 mg, oral, Daily    levocetirizine (XYZAL) 5 mg, oral, Every evening    lipase-protease-amylase 9,000-112,500- 112,500 unit capsule Take by mouth.    [START ON 5/6/2025] lisdexamfetamine (VYVANSE) 60 mg, oral, Every morning    lisdexamfetamine (VYVANSE) 60 mg, oral, Every  morning    lithium ER (LITHOBID) 300 mg, oral, Daily    metoprolol succinate XL (TOPROL-XL) 12.5 mg, oral, Daily    mirtazapine (REMERON) 7.5 mg, oral, Nightly    mometasone-formoterol (Dulera) 200-5 mcg/actuation inhaler 2 puffs, inhalation, 2 times daily, Rinse mouth afterwards. Use with spacer    omeprazole (PRILOSEC) 20 mg, oral, Daily before breakfast    Prolia 60 mg, subcutaneous, Every 6 months       Objective       Physical Exam  Constitutional:       Appearance: Normal appearance.   HENT:      Head: Normocephalic.   Eyes:      Extraocular Movements: Extraocular movements intact.      Conjunctiva/sclera: Conjunctivae normal.      Pupils: Pupils are equal, round, and reactive to light.   Cardiovascular:      Rate and Rhythm: Normal rate and regular rhythm.   Pulmonary:      Effort: Pulmonary effort is normal.      Breath sounds: Normal breath sounds.   Abdominal:      General: Abdomen is flat. Bowel sounds are normal.      Palpations: Abdomen is soft.   Musculoskeletal:         General: Normal range of motion.      Cervical back: Normal range of motion.   Skin:     General: Skin is warm and dry.   Neurological:      General: No focal deficit present.      Mental Status: She is alert and oriented to person, place, and time. Mental status is at baseline.   Psychiatric:         Mood and Affect: Mood normal.         Behavior: Behavior normal.           Assessment/Plan   Missy Patton is a 69 y.o. female who presents for the concerns below:    Assessment & Plan  Infestation by insect    Orders:    Referral to Infectious Disease; Future     Will refer for further management,          Discussed with:   Return in :    Portions of this note were generated using digital voice recognition software, and may contain grammatical errors       Stephen Myers MD  04/21/25  9:05 AM         [1]   Past Medical History:  Diagnosis Date    Asthma     Depression     Hypertension     Irritable bowel syndrome      Osteoporosis     Other conditions influencing health status 2014    Urinary Tract Infection    Otitis media, unspecified, left ear 2013    Otitis media of left ear    Papillomavirus as the cause of diseases classified elsewhere 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    Personal history of diseases of the skin and subcutaneous tissue 2015    History of cyst of breast    Personal history of diseases of the skin and subcutaneous tissue     History of keloid of skin    Personal history of other diseases of the female genital tract 2014    History of vaginitis    Personal history of other diseases of the respiratory system 2013    History of pharyngitis   [2]   Past Surgical History:  Procedure Laterality Date    BREAST SURGERY  2013    Breast Surgery     SECTION, CLASSIC  2014     Section    KIDNEY STONE SURGERY      OTHER SURGICAL HISTORY  2013    Breast Surgery Puncture Aspiration Of Cyst    OTHER SURGICAL HISTORY  2021    Rhytidectomy    OTHER SURGICAL HISTORY  2013    Ear Surgery    OTHER SURGICAL HISTORY  2013    Surgery    RHINOPLASTY  2013    Rhinoplasty    TONSILLECTOMY  2014    Tonsillectomy   [3]   Social History  Tobacco Use    Smoking status: Never     Passive exposure: Never    Smokeless tobacco: Never   Vaping Use    Vaping status: Never Used   Substance Use Topics    Alcohol use: Yes     Alcohol/week: 2.0 standard drinks of alcohol     Types: 2 Glasses of wine per week    Drug use: Never   [4]   Family History  Problem Relation Name Age of Onset    Hypertension Father      Other (Mixed connective tissue disease) Son      Hypertension Paternal Grandmother      Cancer Paternal Grandfather      Heart disease Paternal Grandfather      Diabetes Other Family history     Cancer Other Family history     Coronary artery disease Other Family history     Other (Non-Hodgkin's Lymphoma) Other Family history    [5]    Allergies  Allergen Reactions    Amoxicillin Unknown    Ciprofloxacin Hives    Linalool Hives, Itching and Swelling    Penicillins Unknown    Sulfa (Sulfonamide Antibiotics) Unknown     Nausea

## 2025-04-22 ENCOUNTER — APPOINTMENT (OUTPATIENT)
Dept: PRIMARY CARE | Facility: CLINIC | Age: 70
End: 2025-04-22
Payer: MEDICARE

## 2025-04-25 ENCOUNTER — OFFICE VISIT (OUTPATIENT)
Dept: INFECTIOUS DISEASES | Facility: CLINIC | Age: 70
End: 2025-04-25
Payer: MEDICARE

## 2025-04-25 VITALS
BODY MASS INDEX: 28.02 KG/M2 | HEIGHT: 59 IN | WEIGHT: 139 LBS | DIASTOLIC BLOOD PRESSURE: 91 MMHG | SYSTOLIC BLOOD PRESSURE: 139 MMHG | HEART RATE: 67 BPM | TEMPERATURE: 97.3 F

## 2025-04-25 DIAGNOSIS — Z91.09 ALLERGY TO ENVIRONMENTAL FACTORS: Primary | ICD-10-CM

## 2025-04-25 DIAGNOSIS — B88.2 INFESTATION BY INSECT: ICD-10-CM

## 2025-04-25 PROCEDURE — 1159F MED LIST DOCD IN RCRD: CPT | Performed by: INTERNAL MEDICINE

## 2025-04-25 PROCEDURE — 1160F RVW MEDS BY RX/DR IN RCRD: CPT | Performed by: INTERNAL MEDICINE

## 2025-04-25 PROCEDURE — 1123F ACP DISCUSS/DSCN MKR DOCD: CPT | Performed by: INTERNAL MEDICINE

## 2025-04-25 PROCEDURE — 3008F BODY MASS INDEX DOCD: CPT | Performed by: INTERNAL MEDICINE

## 2025-04-25 PROCEDURE — 3080F DIAST BP >= 90 MM HG: CPT | Performed by: INTERNAL MEDICINE

## 2025-04-25 PROCEDURE — 99205 OFFICE O/P NEW HI 60 MIN: CPT | Performed by: INTERNAL MEDICINE

## 2025-04-25 PROCEDURE — 3075F SYST BP GE 130 - 139MM HG: CPT | Performed by: INTERNAL MEDICINE

## 2025-04-25 ASSESSMENT — ENCOUNTER SYMPTOMS: SINUS COMPLAINT: 1

## 2025-04-25 NOTE — Clinical Note
04/25/25    Stephen Myers MD  [address] [facility]  [facility] [address]      Dear Dr. Stephen Myers MD,    I am writing to confirm that your patient, Missy Patton, received care in my office on 04/25/25. I have enclosed a summary of the care provided to Missy for your reference.    Please contact me with any questions you may have regarding the visit.    Sincerely,         Mariana Guerra MD  [address] [facility]  [facility] [id number]    CC: No Recipients

## 2025-04-25 NOTE — PROGRESS NOTES
"Infectious Diseases Clinic Consult Note:    Referred by Stephen Myers MD  Primary MD: Stephen Myers MD  Reason for Consult: Insect infectation     History of Current Issue  Missy Patton is a 69 y.o. year old female ****       PAST MEDICAL HISTORY:  Medical History[1]  PAST SURGICAL HISTORY:  Surgical History[2]  ALLERGIES:    Allergies[3]    MEDICATIONS:    Current Medications[4]    FAMILY HISTORY:   Family History[5]     SOCIAL HISTORY:       Marital Status:  Tobacco / Smokeless tobacco/ Vaping:   reports that she has never smoked. She has never been exposed to tobacco smoke. She has never used smokeless tobacco.   Alcohol:   Social History     Substance and Sexual Activity   Alcohol Use Yes    Alcohol/week: 2.0 standard drinks of alcohol    Types: 2 Glasses of wine per week      Drug Use:    Social History     Substance and Sexual Activity   Drug Use Never      Employment history: ***    Travel: {Travel:56809::\"Unknown \"}   Pets:  ***    Service:  ***  Hobbies:  ***  IMMUNIZATIONS:    Immunization History   Administered Date(s) Administered    Flu vaccine (IIV4), preservative free *Check age/dose* 09/23/2020    Influenza, Unspecified 09/23/2020    Moderna SARS-CoV-2 Vaccination 03/04/2021, 04/01/2021, 06/30/2022    Pfizer COVID-19 vaccine, 12 years and older, (30mcg/0.3mL) (Comirnaty) 12/13/2023, 09/25/2024       REVIEW OF SYSTEMS:  Review of Systems    PHYSICAL EXAMINATION:       Visit Vitals  BP (!) 139/91   Pulse 67   Temp 36.3 °C (97.3 °F)   Ht 1.499 m (4' 11\")   Wt 63 kg (139 lb)   BMI 28.07 kg/m²   OB Status Postmenopausal   Smoking Status Never   BSA 1.62 m²        EXAM:   Constitutional: WD, NAD  Eyes: PERRL, anicteric sclera.   ENT:  MMM, no oral lesions noted, no thrush; Right nasal passage is encrusted with thick /dried mucus, gray/green. Unable to see nasal passage.  Left side open.  Thin mucus film in nasal passage.   NECK: Supple, no LAD  LUNGS: Breathing unlabored.  Clear in " all lung fields.  CVS: RRR, S1 & S2 normal.    ABD: Soft, NT / ND,   SKIN:  No unusual lesions or rashes.   Patches of dried skin over shoulder. Prior scratch marks. Scalp examined.  No scalp scaling or lesions noted.  Freshly colored hair.    Nails photographed under media for reference. Multiple old splinter hemrrohages.    DATA:    Outside system data reviewed: ***    Laboratory Values and Test Results: ***    Microbiology: ***  Other Relevant Studies:  ***  Imaging Studies:  ***  ASSESSMENT / RECOMMENDATIONS:  ***      I spent *** minutes in the professional and overall care of this patient.      Mariana Guerra MD                         [1]   Past Medical History:  Diagnosis Date    Asthma     Depression     Hypertension     Irritable bowel syndrome     Osteoporosis     Other conditions influencing health status 2014    Urinary Tract Infection    Otitis media, unspecified, left ear 2013    Otitis media of left ear    Papillomavirus as the cause of diseases classified elsewhere 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    Personal history of diseases of the skin and subcutaneous tissue 2015    History of cyst of breast    Personal history of diseases of the skin and subcutaneous tissue     History of keloid of skin    Personal history of other diseases of the female genital tract 2014    History of vaginitis    Personal history of other diseases of the respiratory system 2013    History of pharyngitis   [2]   Past Surgical History:  Procedure Laterality Date    BREAST SURGERY  2013    Breast Surgery     SECTION, CLASSIC  2014     Section    KIDNEY STONE SURGERY      OTHER SURGICAL HISTORY  2013    Breast Surgery Puncture Aspiration Of Cyst    OTHER SURGICAL HISTORY  2021    Rhytidectomy    OTHER SURGICAL HISTORY  2013    Ear Surgery    OTHER SURGICAL HISTORY  2013    Surgery    RHINOPLASTY  2013    Rhinoplasty    TONSILLECTOMY   11/18/2014    Tonsillectomy   [3]   Allergies  Allergen Reactions    Amoxicillin Unknown    Ciprofloxacin Hives    Linalool Hives, Itching and Swelling    Penicillins Unknown    Sulfa (Sulfonamide Antibiotics) Unknown     Nausea   [4]   Current Outpatient Medications:     albuterol 90 mcg/actuation inhaler, Inhale 2 puffs every 4 hours if needed for wheezing or shortness of breath. Every 4 to 6 hours as needed for shortness of breath and wheezng, Disp: 18 g, Rfl: 5    budesonide-formoterol (Symbicort) 160-4.5 mcg/actuation inhaler, Inhale 2 puffs 2 times a day. Rinse mouth with water after use to reduce aftertaste and incidence of candidiasis. Do not swallow., Disp: 10.2 g, Rfl: 11    cholecalciferol (Vitamin D-3) 125 MCG (5000 UT) capsule, Take 1 capsule (125 mcg) by mouth once daily., Disp: , Rfl:     ciclopirox (Loprox) 0.77 % gel, Apply 1 Application topically 2 times a day., Disp: , Rfl:     denosumab (Prolia) 60 mg/mL syringe, Inject 1 mL (60 mg total) under the skin every 6 months., Disp: 1 mL, Rfl: 1    estradiol (Estrace) 0.01 % (0.1 mg/gram) vaginal cream, Insert 0.25 Applicatorfuls (1 g) into the vagina every other day., Disp: 45 g, Rfl: 3    lamoTRIgine (LaMICtal) 200 mg tablet, Take 1 tablet (200 mg) by mouth once daily., Disp: 90 tablet, Rfl: 3    levocetirizine (Xyzal) 5 mg tablet, Take 1 tablet (5 mg) by mouth once daily in the evening., Disp: 30 tablet, Rfl: 11    lipase-protease-amylase 9,000-112,500- 112,500 unit capsule, Take by mouth., Disp: , Rfl:     [START ON 5/6/2025] lisdexamfetamine (Vyvanse) 60 mg capsule, Take 1 capsule (60 mg) by mouth once daily in the morning. Do not fill before May 6, 2025., Disp: 30 capsule, Rfl: 0    lithium ER (Lithobid) 300 mg 12 hr tablet, Take 1 tablet (300 mg) by mouth once daily., Disp: 90 tablet, Rfl: 3    metoprolol succinate XL (Toprol-XL) 25 mg 24 hr tablet, Take 0.5 tablets (12.5 mg) by mouth once daily., Disp: 90 tablet, Rfl: 1    mirtazapine (Remeron)  7.5 mg tablet, Take 1 tablet (7.5 mg) by mouth once daily at bedtime., Disp: 90 tablet, Rfl: 3    omeprazole (PriLOSEC) 20 mg DR capsule, Take 1 capsule (20 mg) by mouth once daily in the morning. Take before meals., Disp: 90 capsule, Rfl: 3    albuterol 2.5 mg /3 mL (0.083 %) nebulizer solution, Take 3 mL (2.5 mg) by nebulization every 4 hours if needed for wheezing or shortness of breath., Disp: 75 mL, Rfl: 5    Current Facility-Administered Medications:     ciclopirox (Penlac) 8 % solution, , Topical, Once, Stephen Myers MD    denosumab (Prolia) injection 60 mg, 60 mg, subcutaneous, q6 months, Sydnee Walker MD, 60 mg at 07/15/24 1446  [5]   Family History  Problem Relation Name Age of Onset    Hypertension Father      Other (Mixed connective tissue disease) Son      Hypertension Paternal Grandmother      Cancer Paternal Grandfather      Heart disease Paternal Grandfather      Diabetes Other Family history     Cancer Other Family history     Coronary artery disease Other Family history     Other (Non-Hodgkin's Lymphoma) Other Family history       Inhale 2 puffs 2 times a day. Rinse mouth with water after use to reduce aftertaste and incidence of candidiasis. Do not swallow., Disp: 10.2 g, Rfl: 11    cholecalciferol (Vitamin D-3) 125 MCG (5000 UT) capsule, Take 1 capsule (125 mcg) by mouth once daily., Disp: , Rfl:     ciclopirox (Loprox) 0.77 % gel, Apply 1 Application topically 2 times a day., Disp: , Rfl:     denosumab (Prolia) 60 mg/mL syringe, Inject 1 mL (60 mg total) under the skin every 6 months., Disp: 1 mL, Rfl: 1    estradiol (Estrace) 0.01 % (0.1 mg/gram) vaginal cream, Insert 0.25 Applicatorfuls (1 g) into the vagina every other day., Disp: 45 g, Rfl: 3    lamoTRIgine (LaMICtal) 200 mg tablet, Take 1 tablet (200 mg) by mouth once daily., Disp: 90 tablet, Rfl: 3    levocetirizine (Xyzal) 5 mg tablet, Take 1 tablet (5 mg) by mouth once daily in the evening., Disp: 30 tablet, Rfl: 11    lipase-protease-amylase 9,000-112,500- 112,500 unit capsule, Take by mouth., Disp: , Rfl:     [START ON 5/6/2025] lisdexamfetamine (Vyvanse) 60 mg capsule, Take 1 capsule (60 mg) by mouth once daily in the morning. Do not fill before May 6, 2025., Disp: 30 capsule, Rfl: 0    lithium ER (Lithobid) 300 mg 12 hr tablet, Take 1 tablet (300 mg) by mouth once daily., Disp: 90 tablet, Rfl: 3    metoprolol succinate XL (Toprol-XL) 25 mg 24 hr tablet, Take 0.5 tablets (12.5 mg) by mouth once daily., Disp: 90 tablet, Rfl: 1    mirtazapine (Remeron) 7.5 mg tablet, Take 1 tablet (7.5 mg) by mouth once daily at bedtime., Disp: 90 tablet, Rfl: 3    omeprazole (PriLOSEC) 20 mg DR capsule, Take 1 capsule (20 mg) by mouth once daily in the morning. Take before meals., Disp: 90 capsule, Rfl: 3    albuterol 2.5 mg /3 mL (0.083 %) nebulizer solution, Take 3 mL (2.5 mg) by nebulization every 4 hours if needed for wheezing or shortness of breath., Disp: 75 mL, Rfl: 5    Current Facility-Administered Medications:     ciclopirox (Penlac) 8 % solution, , Topical, Once, Stephen Myers MD     denosumab (Prolia) injection 60 mg, 60 mg, subcutaneous, q6 months, Sydnee Walker MD, 60 mg at 07/15/24 1445  [5]   Family History  Problem Relation Name Age of Onset    Hypertension Father      Other (Mixed connective tissue disease) Son      Hypertension Paternal Grandmother      Cancer Paternal Grandfather      Heart disease Paternal Grandfather      Diabetes Other Family history     Cancer Other Family history     Coronary artery disease Other Family history     Other (Non-Hodgkin's Lymphoma) Other Family history

## 2025-04-25 NOTE — Clinical Note
04/25/25    Stephen Myers MD  [address] [facility]  [place] [address]      Dear Dr. Stephen Myers MD,    Thank you for referring your patient, Missy Patton, to receive care in my office. I have enclosed a summary of the care provided to Missy on 04/25/25.    Please contact me with any questions you may have regarding the visit.    Sincerely,         Mariana Guerra MD  [address] [facility] [facility] [address]  [facility] [id number]    CC: No Recipients

## 2025-04-25 NOTE — PROGRESS NOTES
Subjective   Patient ID: Missy Patton is a 69 y.o. female who presents for Insect Bite, Itching, Sinus Problem, and Facial Pain.    Sinus Problem    Facial Pain        Review of Systems    Previous history  Medical History[1]  Surgical History[2]  Social History[3]  Family History[4]  Allergies[5]  Current Outpatient Medications   Medication Instructions    albuterol 90 mcg/actuation inhaler 2 puffs, inhalation, Every 4 hours PRN, Every 4 to 6 hours as needed for shortness of breath and wheezng    albuterol 2.5 mg, nebulization, Every 4 hours PRN    budesonide-formoterol (Symbicort) 160-4.5 mcg/actuation inhaler 2 puffs, inhalation, 2 times daily RT, Rinse mouth with water after use to reduce aftertaste and incidence of candidiasis. Do not swallow.    cholecalciferol (Vitamin D-3) 125 MCG (5000 UT) capsule 1 capsule, Daily    ciclopirox (Loprox) 0.77 % gel 1 Application, 2 times daily    estradiol (ESTRACE) 1 g, vaginal, Every other day    lamoTRIgine (LAMICTAL) 200 mg, oral, Daily    levocetirizine (XYZAL) 5 mg, oral, Every evening    lipase-protease-amylase 9,000-112,500- 112,500 unit capsule Take by mouth.    [START ON 5/6/2025] lisdexamfetamine (VYVANSE) 60 mg, oral, Every morning    lithium ER (LITHOBID) 300 mg, oral, Daily    metoprolol succinate XL (TOPROL-XL) 12.5 mg, oral, Daily    mirtazapine (REMERON) 7.5 mg, oral, Nightly    omeprazole (PRILOSEC) 20 mg, oral, Daily before breakfast    Prolia 60 mg, subcutaneous, Every 6 months       Objective       Physical Exam  Constitutional:       Appearance: Normal appearance.   HENT:      Head: Normocephalic.   Eyes:      Extraocular Movements: Extraocular movements intact.      Conjunctiva/sclera: Conjunctivae normal.      Pupils: Pupils are equal, round, and reactive to light.   Cardiovascular:      Rate and Rhythm: Normal rate and regular rhythm.   Pulmonary:      Effort: Pulmonary effort is normal.      Breath sounds: Normal breath sounds.   Abdominal:       General: Abdomen is flat. Bowel sounds are normal.      Palpations: Abdomen is soft.   Musculoskeletal:         General: Normal range of motion.      Cervical back: Normal range of motion.   Skin:     General: Skin is warm and dry.   Neurological:      General: No focal deficit present.      Mental Status: She is alert and oriented to person, place, and time. Mental status is at baseline.   Psychiatric:         Mood and Affect: Mood normal.         Behavior: Behavior normal.     Note She has patches of dry skin, no clear areas of affected areas.       Assessment/Plan   Missy Patton is a 69 y.o. female who presents for the concerns below:    Assessment & Plan  Infestation by insect    Orders:    Referral to Infectious Disease; Future              Discussed with:   Return in :    Portions of this note were generated using digital voice recognition software, and may contain grammatical errors       Stephne Myers MD  25  8:40 AM         [1]   Past Medical History:  Diagnosis Date    Asthma     Depression     Hypertension     Irritable bowel syndrome     Osteoporosis     Other conditions influencing health status 2014    Urinary Tract Infection    Otitis media, unspecified, left ear 2013    Otitis media of left ear    Papillomavirus as the cause of diseases classified elsewhere 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    Personal history of diseases of the skin and subcutaneous tissue 2015    History of cyst of breast    Personal history of diseases of the skin and subcutaneous tissue     History of keloid of skin    Personal history of other diseases of the female genital tract 2014    History of vaginitis    Personal history of other diseases of the respiratory system 2013    History of pharyngitis   [2]   Past Surgical History:  Procedure Laterality Date    BREAST SURGERY  2013    Breast Surgery     SECTION, CLASSIC  2014     Section    KIDNEY STONE  SURGERY  2017    OTHER SURGICAL HISTORY  05/30/2013    Breast Surgery Puncture Aspiration Of Cyst    OTHER SURGICAL HISTORY  12/02/2021    Rhytidectomy    OTHER SURGICAL HISTORY  12/12/2013    Ear Surgery    OTHER SURGICAL HISTORY  12/12/2013    Surgery    RHINOPLASTY  12/12/2013    Rhinoplasty    TONSILLECTOMY  11/18/2014    Tonsillectomy   [3]   Social History  Tobacco Use    Smoking status: Never     Passive exposure: Never    Smokeless tobacco: Never   Vaping Use    Vaping status: Never Used   Substance Use Topics    Alcohol use: Yes     Alcohol/week: 2.0 standard drinks of alcohol     Types: 2 Glasses of wine per week    Drug use: Never   [4]   Family History  Problem Relation Name Age of Onset    Hypertension Father      Other (Mixed connective tissue disease) Son      Hypertension Paternal Grandmother      Cancer Paternal Grandfather      Heart disease Paternal Grandfather      Diabetes Other Family history     Cancer Other Family history     Coronary artery disease Other Family history     Other (Non-Hodgkin's Lymphoma) Other Family history    [5]   Allergies  Allergen Reactions    Amoxicillin Unknown    Ciprofloxacin Hives    Linalool Hives, Itching and Swelling    Penicillins Unknown    Sulfa (Sulfonamide Antibiotics) Unknown     Nausea

## 2025-04-25 NOTE — PATIENT INSTRUCTIONS
It was good to meet you today.  Based on your exam and photos of mucus, I suspect that you are having allergies to the moth larvae.  The mucus serves to entrap any dust of clothing fibers or dust from entering your airways.  When you blow your nose of use the nasal lavage, it will rinse out the mucus with the fibers encased.    Start taking your Xyzal everyday for allergies whether you feel congested or not.  Reserve Benadryl for before bed.    Continue the use of Navage only with sterile water (boil for a full minute then allow to come to room temperature).     For skin itching/dryness, you can try LacHydrin lotion (or the generic equivalent) to help with the itchy skin.  Keeping your skin moisturized should help as well.    There are no medications to treat the moths and their larvae other than eradicating them from the environment.  Wear a mask while cleaning clothes and clothing.  Try to isolate freshly cleaned clothes to a single closet that has been exterminated.   Try using Cedar blocks/beads to hang in the closet.

## 2025-05-05 ENCOUNTER — TELEPHONE (OUTPATIENT)
Dept: PRIMARY CARE | Facility: CLINIC | Age: 70
End: 2025-05-05
Payer: MEDICARE

## 2025-05-05 NOTE — TELEPHONE ENCOUNTER
Pt believes she has lice in her scalp they are falling off and she did have huge rash a while ago that she believes was no longer candida but possibly lice. She is very embarrassed.  But I reminded her that she is in good hands and that I will send her concerns to [facility] and AM regarding how to move forward with her symptoms    advise

## 2025-05-06 NOTE — TELEPHONE ENCOUNTER
Pt called very upset by referrals.  Would like Rx for scalp treatment for lice.  Does not want Derm or infectious diseases referral again.  Wants Rx today, not OTC Tx.

## 2025-05-06 NOTE — TELEPHONE ENCOUNTER
Called patient no answer, left v/m that Dr. Myers would like her to be seen by Derm, gave three locations outside of  also can give a ref for  provider, can call back, AM

## 2025-05-07 NOTE — TELEPHONE ENCOUNTER
Called patient 05/06/2025 and advised if this is urgent to go to urgent care, but [facility] has some same day apts, gave location and phone number to all three [facility] [place] [facility], AM

## 2025-05-19 ENCOUNTER — OFFICE VISIT (OUTPATIENT)
Dept: URGENT CARE | Age: 70
End: 2025-05-19
Payer: MEDICARE

## 2025-05-19 ENCOUNTER — HOSPITAL ENCOUNTER (OUTPATIENT)
Dept: RADIOLOGY | Facility: CLINIC | Age: 70
Discharge: HOME | End: 2025-05-19
Payer: MEDICARE

## 2025-05-19 VITALS
SYSTOLIC BLOOD PRESSURE: 144 MMHG | HEART RATE: 66 BPM | DIASTOLIC BLOOD PRESSURE: 90 MMHG | RESPIRATION RATE: 16 BRPM | WEIGHT: 140 LBS | OXYGEN SATURATION: 97 % | BODY MASS INDEX: 28.28 KG/M2 | TEMPERATURE: 97.7 F

## 2025-05-19 DIAGNOSIS — M79.651 PAIN OF RIGHT THIGH: ICD-10-CM

## 2025-05-19 DIAGNOSIS — M79.651 PAIN OF RIGHT THIGH: Primary | ICD-10-CM

## 2025-05-19 DIAGNOSIS — M16.11 PRIMARY OSTEOARTHRITIS OF RIGHT HIP: ICD-10-CM

## 2025-05-19 PROCEDURE — 73552 X-RAY EXAM OF FEMUR 2/>: CPT | Mod: RIGHT SIDE | Performed by: RADIOLOGY

## 2025-05-19 PROCEDURE — 1159F MED LIST DOCD IN RCRD: CPT | Performed by: SPECIALIST

## 2025-05-19 PROCEDURE — 3077F SYST BP >= 140 MM HG: CPT | Performed by: SPECIALIST

## 2025-05-19 PROCEDURE — 73552 X-RAY EXAM OF FEMUR 2/>: CPT | Mod: RT

## 2025-05-19 PROCEDURE — 1036F TOBACCO NON-USER: CPT | Performed by: SPECIALIST

## 2025-05-19 PROCEDURE — 1125F AMNT PAIN NOTED PAIN PRSNT: CPT | Performed by: SPECIALIST

## 2025-05-19 PROCEDURE — 99213 OFFICE O/P EST LOW 20 MIN: CPT | Performed by: SPECIALIST

## 2025-05-19 PROCEDURE — 3080F DIAST BP >= 90 MM HG: CPT | Performed by: SPECIALIST

## 2025-05-19 RX ORDER — METHYLPREDNISOLONE 4 MG/1
TABLET ORAL
Qty: 21 TABLET | Refills: 0 | Status: SHIPPED | OUTPATIENT
Start: 2025-05-19 | End: 2025-05-25

## 2025-05-19 ASSESSMENT — PATIENT HEALTH QUESTIONNAIRE - PHQ9
SUM OF ALL RESPONSES TO PHQ9 QUESTIONS 1 AND 2: 0
1. LITTLE INTEREST OR PLEASURE IN DOING THINGS: NOT AT ALL
2. FEELING DOWN, DEPRESSED OR HOPELESS: NOT AT ALL

## 2025-05-19 ASSESSMENT — ENCOUNTER SYMPTOMS
CONSTITUTIONAL NEGATIVE: 1
BACK PAIN: 1
ARTHRALGIAS: 1

## 2025-05-19 ASSESSMENT — PAIN SCALES - GENERAL: PAINLEVEL_OUTOF10: 2

## 2025-05-19 NOTE — PROGRESS NOTES
Subjective   Patient ID: Missy Patton is a 69 y.o. female. They present today with a chief complaint of No chief complaint on file..    History of Present Illness  HPI    Past Medical History  Allergies as of 05/19/2025 - Reviewed 04/25/2025   Allergen Reaction Noted    Amoxicillin Unknown 05/22/2018    Ciprofloxacin Hives 01/30/2025    Linalool Hives, Itching, and Swelling 12/14/2021    Penicillins Unknown 02/17/2025    Sulfa (sulfonamide antibiotics) Unknown 12/13/2013       Prescriptions Prior to Admission[1]     Medical History[2]    Surgical History[3]     reports that she has never smoked. She has never been exposed to tobacco smoke. She has never used smokeless tobacco. She reports current alcohol use of about 2.0 standard drinks of alcohol per week. She reports that she does not use drugs.    Review of Systems  Review of Systems   Constitutional: Negative.    Musculoskeletal:  Positive for arthralgias and back pain.                                  Objective    There were no vitals filed for this visit.  No LMP recorded. Patient is postmenopausal.    Physical Exam  Constitutional:       Appearance: Normal appearance.   Musculoskeletal:      Right hip: Tenderness present.      Right knee: Decreased range of motion.   Skin:     Findings: No bruising or erythema.   Neurological:      Mental Status: She is alert.         Procedures    Point of Care Test & Imaging Results from this visit  No results found for this visit on 05/19/25.   Imaging  No results found.    Cardiology, Vascular, and Other Imaging  No other imaging results found for the past 2 days      Diagnostic study results (if any) were reviewed by Sophia Carson MD.    Assessment/Plan   Allergies, medications, history, and pertinent labs/EKGs/Imaging reviewed by Sophia Carson MD.     Medical Decision Making  Pian in right hamstring, right hip and right knee, X rays shows some arthritic changes in right hip. Will refer patient to sports  Medicine, also will treat with Steroids.    Orders and Diagnoses  There are no diagnoses linked to this encounter.    Medical Admin Record      Patient disposition: Home    Electronically signed by Sophia Carson MD  5:21 PM           [1] (Not in a hospital admission)  [2]   Past Medical History:  Diagnosis Date    Asthma     Depression     Hypertension     Irritable bowel syndrome     Osteoporosis     Other conditions influencing health status 2014    Urinary Tract Infection    Otitis media, unspecified, left ear 2013    Otitis media of left ear    Papillomavirus as the cause of diseases classified elsewhere 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    Personal history of diseases of the skin and subcutaneous tissue 2015    History of cyst of breast    Personal history of diseases of the skin and subcutaneous tissue     History of keloid of skin    Personal history of other diseases of the female genital tract 2014    History of vaginitis    Personal history of other diseases of the respiratory system 2013    History of pharyngitis   [3]   Past Surgical History:  Procedure Laterality Date    BREAST SURGERY  2013    Breast Surgery     SECTION, CLASSIC  2014     Section    KIDNEY STONE SURGERY      OTHER SURGICAL HISTORY  2013    Breast Surgery Puncture Aspiration Of Cyst    OTHER SURGICAL HISTORY  2021    Rhytidectomy    OTHER SURGICAL HISTORY  2013    Ear Surgery    OTHER SURGICAL HISTORY  2013    Surgery    RHINOPLASTY  2013    Rhinoplasty    TONSILLECTOMY  2014    Tonsillectomy

## 2025-05-30 ENCOUNTER — HOSPITAL ENCOUNTER (OUTPATIENT)
Dept: RADIOLOGY | Facility: HOSPITAL | Age: 70
Discharge: HOME | End: 2025-05-30
Payer: MEDICARE

## 2025-05-30 ENCOUNTER — OFFICE VISIT (OUTPATIENT)
Dept: ORTHOPEDIC SURGERY | Facility: HOSPITAL | Age: 70
End: 2025-05-30
Payer: MEDICARE

## 2025-05-30 ENCOUNTER — TELEPHONE (OUTPATIENT)
Dept: PRIMARY CARE | Facility: CLINIC | Age: 70
End: 2025-05-30

## 2025-05-30 ENCOUNTER — OFFICE VISIT (OUTPATIENT)
Dept: URGENT CARE | Age: 70
End: 2025-05-30
Payer: MEDICARE

## 2025-05-30 VITALS
HEART RATE: 74 BPM | SYSTOLIC BLOOD PRESSURE: 130 MMHG | DIASTOLIC BLOOD PRESSURE: 80 MMHG | RESPIRATION RATE: 16 BRPM | OXYGEN SATURATION: 98 % | TEMPERATURE: 97.5 F

## 2025-05-30 DIAGNOSIS — M25.551 ACUTE HIP PAIN, RIGHT: ICD-10-CM

## 2025-05-30 DIAGNOSIS — M16.11 PRIMARY OSTEOARTHRITIS OF RIGHT HIP: ICD-10-CM

## 2025-05-30 DIAGNOSIS — L08.9 INFECTION OF SCALP: Primary | ICD-10-CM

## 2025-05-30 DIAGNOSIS — B85.0 HEAD LICE: ICD-10-CM

## 2025-05-30 DIAGNOSIS — M54.16 LUMBAR RADICULOPATHY: Primary | ICD-10-CM

## 2025-05-30 PROCEDURE — 72170 X-RAY EXAM OF PELVIS: CPT

## 2025-05-30 PROCEDURE — 99203 OFFICE O/P NEW LOW 30 MIN: CPT | Performed by: PHYSICIAN ASSISTANT

## 2025-05-30 PROCEDURE — 99213 OFFICE O/P EST LOW 20 MIN: CPT | Performed by: PHYSICIAN ASSISTANT

## 2025-05-30 PROCEDURE — 72170 X-RAY EXAM OF PELVIS: CPT | Performed by: RADIOLOGY

## 2025-05-30 RX ORDER — CHLORHEXIDINE GLUCONATE 40 MG/ML
SOLUTION TOPICAL DAILY
Qty: 118 ML | Refills: 0 | Status: SHIPPED | OUTPATIENT
Start: 2025-05-30 | End: 2025-06-09

## 2025-05-30 RX ORDER — PERMETHRIN 50 MG/G
CREAM TOPICAL ONCE
Qty: 60 G | Refills: 0 | Status: SHIPPED | OUTPATIENT
Start: 2025-05-30 | End: 2025-05-30

## 2025-05-30 RX ORDER — MUPIROCIN 20 MG/G
OINTMENT TOPICAL
Qty: 22 G | Refills: 0 | Status: SHIPPED | OUTPATIENT
Start: 2025-05-30 | End: 2025-06-09

## 2025-05-30 ASSESSMENT — ENCOUNTER SYMPTOMS
NERVOUS/ANXIOUS: 1
HEADACHES: 1
CHILLS: 0
BRUISES/BLEEDS EASILY: 0
DIZZINESS: 0
FATIGUE: 0
WEAKNESS: 0
FACIAL ASYMMETRY: 0
RESPIRATORY NEGATIVE: 1

## 2025-05-30 NOTE — PATIENT INSTRUCTIONS
Based on history, clinical exam, scalp infection recommend treatment with Hibiclens, mupirocin topically for the next 10 days.  Head lice treatment with Elimite x 1 may repeat in a week.  Follow-up with PCP.  Blood pressure rechecked.if persistently elevated please follow-up with PCP in 3 to 5 days.

## 2025-05-30 NOTE — TELEPHONE ENCOUNTER
Pt called and said that she was very dissatisfied with the care she received from the dermatologist and was going to go to urgent care to see if they can actually help her. Pt said she has tried over the counter medication with no success. Pt wanted to make you aware of what she will be doing and possibly hoping you have some other options for her.

## 2025-05-30 NOTE — PROGRESS NOTES
HPI  This is a pleasant 69 y.o. female here today for further evaluation of right lateral thigh pain.  The pain started a couple weeks ago.  She denies any specific injury.  She had gone to an urgent care where they did an x-ray of the femur and she was told she had some early arthritis in her hip.  She denies any groin pain.  She only has pain on the lateral aspect of the distal thigh and below the knee as well into the lateral calf.  It is occasional.  She has no pain with sitting.  She can get a sharp exacerbation of the pain with bending such as going to sit down.  Her pain can be up to a 7 out of 10.  She recently finished an oral steroid which did help her pain quite a bit.      Medical History[1]    Surgical History[2]    Social History[3]        ROS  Reviewed and all pertinent positives mentioned in HPI.      EXAM  Patient is in no acute distress.  They are alert and oriented x3.  They are of normal mood and affect.  They are in no acute distress.  The patient's limb is warm and well-perfused.  They have intact sensation to light touch in all lower extremity dermatomes.  Full ROM of the hip with no pain.  TTP over the james troch space.  No pain over the IT band.  Negative Obers.  No knee effusion. No joint line pain.      IMAGING  Imaging reviewed today reveal no gross fracture or dislocation.        ASSESSMENT/PLAN  Right leg pain secondary to lumbar radiculopathy.  Will have her start a course of PT.  Will use OTC NSAIDs.  Given follow up information for Dr. Vargas if her pain persists.             Sherri Dietz PA-C         [1]   Past Medical History:  Diagnosis Date    Asthma     Depression     Hypertension     Irritable bowel syndrome     Osteoporosis     Other conditions influencing health status 11/18/2014    Urinary Tract Infection    Otitis media, unspecified, left ear 05/31/2013    Otitis media of left ear    Papillomavirus as the cause of diseases classified elsewhere 11/18/2014    HPV in  female    Personal history of diseases of the skin and subcutaneous tissue 2015    History of cyst of breast    Personal history of diseases of the skin and subcutaneous tissue     History of keloid of skin    Personal history of other diseases of the female genital tract 2014    History of vaginitis    Personal history of other diseases of the respiratory system 2013    History of pharyngitis   [2]   Past Surgical History:  Procedure Laterality Date    BREAST SURGERY  2013    Breast Surgery     SECTION, CLASSIC  2014     Section    KIDNEY STONE SURGERY      OTHER SURGICAL HISTORY  2013    Breast Surgery Puncture Aspiration Of Cyst    OTHER SURGICAL HISTORY  2021    Rhytidectomy    OTHER SURGICAL HISTORY  2013    Ear Surgery    OTHER SURGICAL HISTORY  2013    Surgery    RHINOPLASTY  2013    Rhinoplasty    TONSILLECTOMY  2014    Tonsillectomy   [3]   Social History  Tobacco Use    Smoking status: Never     Passive exposure: Never    Smokeless tobacco: Never   Vaping Use    Vaping status: Never Used   Substance Use Topics    Alcohol use: Yes     Alcohol/week: 2.0 standard drinks of alcohol     Types: 2 Glasses of wine per week    Drug use: Never

## 2025-05-30 NOTE — PROGRESS NOTES
Subjective   Patient ID: Missy Patton is a 69 y.o. female. They present today with a chief complaint of Blister (In head x couple weeks sx increasesd x3 days /Tight, painful x today ) and Headache.    History of Present Illness  68 yo with concerns for lice on scalp, past 1 month has been dealing with house infestation with moth, no improvement with OTC nix and permethrin. ,  There is images of stuff that is falling off her scalp, she has been brushing it excessively and has noticed tenderness and pain over her scalp currently.  In the interim has been seen by dermatologist and primary care.       History provided by:  Patient   used: No        Past Medical History  Allergies as of 05/30/2025 - Reviewed 05/30/2025   Allergen Reaction Noted    Amoxicillin Unknown 05/22/2018    Ciprofloxacin Hives 01/30/2025    Linalool Hives, Itching, and Swelling 12/14/2021    Penicillins Unknown 02/17/2025    Sulfa (sulfonamide antibiotics) Unknown 12/13/2013       Prescriptions Prior to Admission[1]     Medical History[2]    Surgical History[3]     reports that she has never smoked. She has never been exposed to tobacco smoke. She has never used smokeless tobacco. She reports current alcohol use of about 2.0 standard drinks of alcohol per week. She reports that she does not use drugs.    Review of Systems  Review of Systems   Constitutional:  Negative for chills and fatigue.   Respiratory: Negative.     Skin:  Positive for rash.   Neurological:  Positive for headaches. Negative for dizziness, facial asymmetry and weakness.   Hematological:  Does not bruise/bleed easily.   Psychiatric/Behavioral:  The patient is nervous/anxious.                                   Objective    Vitals:    05/30/25 1252 05/30/25 1255   BP: (!) 172/102 130/80   BP Location: Right arm Left arm   Patient Position: Sitting Sitting   Pulse: 74    Resp: 16    Temp: 36.4 °C (97.5 °F)    SpO2: 98%      No LMP recorded (lmp unknown).  Patient is postmenopausal.    Physical Exam  Vitals (BP rechecked.) and nursing note reviewed.   Constitutional:       General: She is not in acute distress.     Appearance: Normal appearance.   Cardiovascular:      Rate and Rhythm: Normal rate and regular rhythm.      Pulses: Normal pulses.      Heart sounds: Normal heart sounds.   Pulmonary:      Effort: Pulmonary effort is normal.      Breath sounds: Normal breath sounds.   Lymphadenopathy:      Cervical: No cervical adenopathy.   Skin:     Findings: Lesion (follicular rash over the scalp crossing the midline, red plaques, non fluctuant, no lice or nits identified.) present.   Neurological:      General: No focal deficit present.      Mental Status: She is alert and oriented to person, place, and time.   Psychiatric:         Mood and Affect: Mood normal.         Procedures    Point of Care Test & Imaging Results from this visit  No results found for this visit on 05/30/25.   Imaging  No results found.    Cardiology, Vascular, and Other Imaging  No other imaging results found for the past 2 days      Diagnostic study results (if any) were reviewed by Sravantih Savage MD.    Assessment/Plan   Allergies, medications, history, and pertinent labs/EKGs/Imaging reviewed by Sravanthi Savage MD.     Medical Decision Making  Based on history, clinical exam, scalp infection recommend treatment with Hibiclens, mupirocin topically for the next 10 days.  Head lice treatment with Elimite x 1 may repeat in a week.  Follow-up with PCP.  Blood pressure rechecked.if persistently elevated please follow-up with PCP in 3 to 5 days.     Orders and Diagnoses  Diagnoses and all orders for this visit:  Infection of scalp  -     chlorhexidine (Hibiclens) 4 % external liquid; Apply topically once daily for 10 doses.  -     mupirocin (Bactroban) 2 % ointment; Apply topically 3 times a day for 10 days.  Head lice  -     permethrin (Elimite) 5 % cream; Apply topically 1 time for 1  dose.      Medical Admin Record      Patient disposition: Home    Electronically signed by Sravanthi Savage MD  8:11 PM           [1] (Not in a hospital admission)   [2]   Past Medical History:  Diagnosis Date    Asthma     Depression     Hypertension     Irritable bowel syndrome     Osteoporosis     Other conditions influencing health status 2014    Urinary Tract Infection    Otitis media, unspecified, left ear 2013    Otitis media of left ear    Papillomavirus as the cause of diseases classified elsewhere 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    Personal history of diseases of the skin and subcutaneous tissue 2015    History of cyst of breast    Personal history of diseases of the skin and subcutaneous tissue     History of keloid of skin    Personal history of other diseases of the female genital tract 2014    History of vaginitis    Personal history of other diseases of the respiratory system 2013    History of pharyngitis   [3]   Past Surgical History:  Procedure Laterality Date    BREAST SURGERY  2013    Breast Surgery     SECTION, CLASSIC  2014     Section    KIDNEY STONE SURGERY  2017    OTHER SURGICAL HISTORY  2013    Breast Surgery Puncture Aspiration Of Cyst    OTHER SURGICAL HISTORY  2021    Rhytidectomy    OTHER SURGICAL HISTORY  2013    Ear Surgery    OTHER SURGICAL HISTORY  2013    Surgery    RHINOPLASTY  2013    Rhinoplasty    TONSILLECTOMY  2014    Tonsillectomy

## 2025-06-04 ENCOUNTER — OFFICE VISIT (OUTPATIENT)
Dept: PRIMARY CARE | Facility: CLINIC | Age: 70
End: 2025-06-04
Payer: MEDICARE

## 2025-06-04 VITALS
HEART RATE: 76 BPM | DIASTOLIC BLOOD PRESSURE: 86 MMHG | TEMPERATURE: 98 F | SYSTOLIC BLOOD PRESSURE: 128 MMHG | OXYGEN SATURATION: 96 %

## 2025-06-04 DIAGNOSIS — B85.2 LICE INFESTATION: Primary | ICD-10-CM

## 2025-06-04 PROCEDURE — 3074F SYST BP LT 130 MM HG: CPT | Performed by: INTERNAL MEDICINE

## 2025-06-04 PROCEDURE — 1159F MED LIST DOCD IN RCRD: CPT | Performed by: INTERNAL MEDICINE

## 2025-06-04 PROCEDURE — 99213 OFFICE O/P EST LOW 20 MIN: CPT | Performed by: INTERNAL MEDICINE

## 2025-06-04 PROCEDURE — 1036F TOBACCO NON-USER: CPT | Performed by: INTERNAL MEDICINE

## 2025-06-04 PROCEDURE — 3079F DIAST BP 80-89 MM HG: CPT | Performed by: INTERNAL MEDICINE

## 2025-06-04 RX ORDER — IVERMECTIN 3 MG/1
3 TABLET ORAL ONCE
Qty: 1 TABLET | Refills: 0 | Status: SHIPPED | OUTPATIENT
Start: 2025-06-04 | End: 2025-06-04

## 2025-06-04 NOTE — PROGRESS NOTES
Subjective   Patient ID: Missy Patton is a 69 y.o. female who presents for Rash and Head Lice.    In for follow up for LICE infestation.     Rash  This is a chronic problem. The current episode started more than 1 month ago. The problem has been gradually worsening since onset. The affected locations include the head and neck. The rash is characterized by blistering, pain and itchiness. She was exposed to nothing.       Review of Systems   Skin:  Positive for rash.   All other systems reviewed and are negative.      Previous history  Medical History[1]  Surgical History[2]  Social History[3]  Family History[4]  Allergies[5]  Current Outpatient Medications   Medication Instructions    albuterol 90 mcg/actuation inhaler 2 puffs, inhalation, Every 4 hours PRN, Every 4 to 6 hours as needed for shortness of breath and wheezng    albuterol 2.5 mg, nebulization, Every 4 hours PRN    budesonide-formoterol (Symbicort) 160-4.5 mcg/actuation inhaler 2 puffs, inhalation, 2 times daily RT, Rinse mouth with water after use to reduce aftertaste and incidence of candidiasis. Do not swallow.    chlorhexidine (Hibiclens) 4 % external liquid Topical, Daily    cholecalciferol (Vitamin D-3) 125 MCG (5000 UT) capsule 1 capsule, Daily    ciclopirox (Loprox) 0.77 % gel 1 Application, 2 times daily    estradiol (ESTRACE) 1 g, vaginal, Every other day    lamoTRIgine (LAMICTAL) 200 mg, oral, Daily    levocetirizine (XYZAL) 5 mg, oral, Every evening    lipase-protease-amylase 9,000-112,500- 112,500 unit capsule Take by mouth.    lisdexamfetamine (VYVANSE) 60 mg, oral, Every morning    lithium ER (LITHOBID) 300 mg, oral, Daily    metoprolol succinate XL (TOPROL-XL) 12.5 mg, oral, Daily    mirtazapine (REMERON) 7.5 mg, oral, Nightly    mupirocin (Bactroban) 2 % ointment Topical, 3 times daily RT    omeprazole (PRILOSEC) 20 mg, oral, Daily before breakfast    Prolia 60 mg, subcutaneous, Every 6 months       Objective       Physical  Exam      Assessment/Plan   Missy Patton is a 69 y.o. female who presents for the concerns below:    Assessment & Plan  Lice infestation    Orders:    ivermectin (Stromectol) 3 mg tablet; Take 1 tablet (3 mg) by mouth 1 time for 1 dose.     Continue LICE Comb. I was not able to identify any organisms on exam .      Call if no better    Discussed with:   Return in :    Portions of this note were generated using digital voice recognition software, and may contain grammatical errors       Stephen Myers MD  25  11:35 AM         [1]   Past Medical History:  Diagnosis Date    Asthma     Depression     Hypertension     Irritable bowel syndrome     Osteoporosis     Other conditions influencing health status 2014    Urinary Tract Infection    Otitis media, unspecified, left ear 2013    Otitis media of left ear    Papillomavirus as the cause of diseases classified elsewhere 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    Personal history of diseases of the skin and subcutaneous tissue 2015    History of cyst of breast    Personal history of diseases of the skin and subcutaneous tissue     History of keloid of skin    Personal history of other diseases of the female genital tract 2014    History of vaginitis    Personal history of other diseases of the respiratory system 2013    History of pharyngitis   [2]   Past Surgical History:  Procedure Laterality Date    BREAST SURGERY  2013    Breast Surgery     SECTION, CLASSIC  2014     Section    KIDNEY STONE SURGERY      OTHER SURGICAL HISTORY  2013    Breast Surgery Puncture Aspiration Of Cyst    OTHER SURGICAL HISTORY  2021    Rhytidectomy    OTHER SURGICAL HISTORY  2013    Ear Surgery    OTHER SURGICAL HISTORY  2013    Surgery    RHINOPLASTY  2013    Rhinoplasty    TONSILLECTOMY  2014    Tonsillectomy   [3]   Social History  Tobacco Use    Smoking status: Never     Passive exposure:  Never    Smokeless tobacco: Never   Vaping Use    Vaping status: Never Used   Substance Use Topics    Alcohol use: Yes     Alcohol/week: 2.0 standard drinks of alcohol     Types: 2 Glasses of wine per week    Drug use: Never   [4]   Family History  Problem Relation Name Age of Onset    Hypertension Father      Other (Mixed connective tissue disease) Son      Hypertension Paternal Grandmother      Cancer Paternal Grandfather      Heart disease Paternal Grandfather      Diabetes Other Family history     Cancer Other Family history     Coronary artery disease Other Family history     Other (Non-Hodgkin's Lymphoma) Other Family history    [5]   Allergies  Allergen Reactions    Amoxicillin Unknown    Ciprofloxacin Hives    Linalool Hives, Itching and Swelling    Penicillins Unknown    Sulfa (Sulfonamide Antibiotics) Unknown     Nausea

## 2025-06-11 DIAGNOSIS — F90.2 ATTENTION DEFICIT HYPERACTIVITY DISORDER (ADHD), COMBINED TYPE: ICD-10-CM

## 2025-06-11 DIAGNOSIS — I10 PRIMARY HYPERTENSION: ICD-10-CM

## 2025-06-11 RX ORDER — METOPROLOL SUCCINATE 25 MG/1
TABLET, EXTENDED RELEASE ORAL
Qty: 90 TABLET | Refills: 1 | Status: SHIPPED | OUTPATIENT
Start: 2025-06-11

## 2025-06-11 RX ORDER — LISDEXAMFETAMINE DIMESYLATE 60 MG/1
60 CAPSULE ORAL EVERY MORNING
Qty: 30 CAPSULE | Refills: 0 | Status: SHIPPED | OUTPATIENT
Start: 2025-06-11 | End: 2025-07-11

## 2025-06-11 NOTE — PROGRESS NOTES
Reviewed OARRS on 06/11/2025 by GUANAKO Moy-CNP -OARRS has been reviewed and is consistent with prescribed medications, Considered the risks of abuse, dependence, addiction and diversion, Medication is felt to be clinically appropriate based on documented diagnosis    Pt requesting refill of her Vyvanse.

## 2025-06-12 ENCOUNTER — APPOINTMENT (OUTPATIENT)
Facility: CLINIC | Age: 70
End: 2025-06-12
Payer: MEDICARE

## 2025-06-18 ENCOUNTER — APPOINTMENT (OUTPATIENT)
Dept: PRIMARY CARE | Facility: CLINIC | Age: 70
End: 2025-06-18
Payer: MEDICARE

## 2025-06-19 ENCOUNTER — TELEPHONE (OUTPATIENT)
Facility: CLINIC | Age: 70
End: 2025-06-19

## 2025-06-19 DIAGNOSIS — N95.2 VAGINAL ATROPHY: ICD-10-CM

## 2025-06-19 RX ORDER — ESTRADIOL 0.1 MG/G
1 CREAM VAGINAL EVERY OTHER DAY
Qty: 45 G | Refills: 3 | Status: SHIPPED | OUTPATIENT
Start: 2025-06-19 | End: 2026-06-19

## 2025-06-19 RX ORDER — ESTRADIOL 0.1 MG/G
1 CREAM VAGINAL EVERY OTHER DAY
Qty: 45 G | Refills: 3 | Status: SHIPPED | OUTPATIENT
Start: 2025-06-19 | End: 2025-06-19 | Stop reason: SDUPTHER

## 2025-06-19 NOTE — TELEPHONE ENCOUNTER
Patient sent a [employer] message on 6/12 for a refill request and she's following up on that request for estrogen cream. Patient stated that she has not used it for quite a while and she's in need of it. Preferred pharmacy is [facility] in [place].

## 2025-06-20 ENCOUNTER — OFFICE VISIT (OUTPATIENT)
Dept: PRIMARY CARE | Facility: CLINIC | Age: 70
End: 2025-06-20
Payer: MEDICARE

## 2025-06-20 VITALS
TEMPERATURE: 97.8 F | HEART RATE: 78 BPM | RESPIRATION RATE: 16 BRPM | HEIGHT: 59 IN | SYSTOLIC BLOOD PRESSURE: 150 MMHG | WEIGHT: 138 LBS | OXYGEN SATURATION: 98 % | DIASTOLIC BLOOD PRESSURE: 82 MMHG | BODY MASS INDEX: 27.82 KG/M2

## 2025-06-20 DIAGNOSIS — B85.2 LICE: Primary | ICD-10-CM

## 2025-06-20 PROCEDURE — 99213 OFFICE O/P EST LOW 20 MIN: CPT | Performed by: INTERNAL MEDICINE

## 2025-06-20 PROCEDURE — 3008F BODY MASS INDEX DOCD: CPT | Performed by: INTERNAL MEDICINE

## 2025-06-20 PROCEDURE — 3079F DIAST BP 80-89 MM HG: CPT | Performed by: INTERNAL MEDICINE

## 2025-06-20 PROCEDURE — 3077F SYST BP >= 140 MM HG: CPT | Performed by: INTERNAL MEDICINE

## 2025-06-20 RX ORDER — MALATHION 0 G/ML
LOTION TOPICAL ONCE
Qty: 400 ML | Refills: 1 | Status: SHIPPED | OUTPATIENT
Start: 2025-06-20 | End: 2025-06-20

## 2025-06-20 ASSESSMENT — PATIENT HEALTH QUESTIONNAIRE - PHQ9
6. FEELING BAD ABOUT YOURSELF - OR THAT YOU ARE A FAILURE OR HAVE LET YOURSELF OR YOUR FAMILY DOWN: NOT AT ALL
8. MOVING OR SPEAKING SO SLOWLY THAT OTHER PEOPLE COULD HAVE NOTICED. OR THE OPPOSITE, BEING SO FIGETY OR RESTLESS THAT YOU HAVE BEEN MOVING AROUND A LOT MORE THAN USUAL: NOT AT ALL
SUM OF ALL RESPONSES TO PHQ QUESTIONS 1-9: 0
9. THOUGHTS THAT YOU WOULD BE BETTER OFF DEAD, OR OF HURTING YOURSELF: NOT AT ALL
3. TROUBLE FALLING OR STAYING ASLEEP OR SLEEPING TOO MUCH: NOT AT ALL
7. TROUBLE CONCENTRATING ON THINGS, SUCH AS READING THE NEWSPAPER OR WATCHING TELEVISION: NOT AT ALL
5. POOR APPETITE OR OVEREATING: NOT AT ALL
2. FEELING DOWN, DEPRESSED OR HOPELESS: NOT AT ALL
4. FEELING TIRED OR HAVING LITTLE ENERGY: NOT AT ALL
SUM OF ALL RESPONSES TO PHQ9 QUESTIONS 1 AND 2: 0
1. LITTLE INTEREST OR PLEASURE IN DOING THINGS: NOT AT ALL

## 2025-06-20 ASSESSMENT — ANXIETY QUESTIONNAIRES
5. BEING SO RESTLESS THAT IT IS HARD TO SIT STILL: NOT AT ALL
3. WORRYING TOO MUCH ABOUT DIFFERENT THINGS: NOT AT ALL
4. TROUBLE RELAXING: NOT AT ALL
1. FEELING NERVOUS, ANXIOUS, OR ON EDGE: SEVERAL DAYS
IF YOU CHECKED OFF ANY PROBLEMS ON THIS QUESTIONNAIRE, HOW DIFFICULT HAVE THESE PROBLEMS MADE IT FOR YOU TO DO YOUR WORK, TAKE CARE OF THINGS AT HOME, OR GET ALONG WITH OTHER PEOPLE: NOT DIFFICULT AT ALL
GAD7 TOTAL SCORE: 2
7. FEELING AFRAID AS IF SOMETHING AWFUL MIGHT HAPPEN: NOT AT ALL
2. NOT BEING ABLE TO STOP OR CONTROL WORRYING: SEVERAL DAYS
6. BECOMING EASILY ANNOYED OR IRRITABLE: NOT AT ALL

## 2025-06-20 NOTE — PROGRESS NOTES
Subjective   Patient ID: Missy Patton is a 69 y.o. female who presents for Follow-up, Rash, and Blister.    In for follow up for head lice.     Rash        Review of Systems   Skin:  Positive for rash.   All other systems reviewed and are negative.      Previous history  Medical History[1]  Surgical History[2]  Social History[3]  Family History[4]  Allergies[5]  Current Outpatient Medications   Medication Instructions    albuterol 90 mcg/actuation inhaler 2 puffs, inhalation, Every 4 hours PRN, Every 4 to 6 hours as needed for shortness of breath and wheezng    albuterol 2.5 mg, nebulization, Every 4 hours PRN    budesonide-formoterol (Symbicort) 160-4.5 mcg/actuation inhaler 2 puffs, inhalation, 2 times daily RT, Rinse mouth with water after use to reduce aftertaste and incidence of candidiasis. Do not swallow.    cholecalciferol (Vitamin D-3) 125 MCG (5000 UT) capsule 1 capsule, Daily    ciclopirox (Loprox) 0.77 % gel 1 Application, 2 times daily    estradiol (ESTRACE) 1 g, vaginal, Every other day    lamoTRIgine (LAMICTAL) 200 mg, oral, Daily    levocetirizine (XYZAL) 5 mg, oral, Every evening    lipase-protease-amylase 9,000-112,500- 112,500 unit capsule Take by mouth.    lisdexamfetamine (VYVANSE) 60 mg, oral, Every morning    lithium ER (LITHOBID) 300 mg, oral, Daily    metoprolol succinate XL (Toprol-XL) 25 mg 24 hr tablet TAKE 1/2 TABLET(12.5 MG) BY MOUTH ONCE DAILY    mirtazapine (REMERON) 7.5 mg, oral, Nightly    omeprazole (PRILOSEC) 20 mg, oral, Daily before breakfast    Prolia 60 mg, subcutaneous, Every 6 months       Objective       Physical Exam      Assessment/Plan   Missy Patton is a 69 y.o. female who presents for the concerns below:    Assessment & Plan  Lice    Orders:    malathion (Ovide) 0.5 % lotion; Apply topically 1 time for 1 dose.              Discussed with:   Return in :    Portions of this note were generated using digital voice recognition software, and may contain  grammatical errors       Stephen Myers MD  25  8:37 AM         [1]   Past Medical History:  Diagnosis Date    Asthma     Depression     Hypertension     Irritable bowel syndrome     Osteoporosis     Other conditions influencing health status 2014    Urinary Tract Infection    Otitis media, unspecified, left ear 2013    Otitis media of left ear    Papillomavirus as the cause of diseases classified elsewhere 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    Personal history of diseases of the skin and subcutaneous tissue 2015    History of cyst of breast    Personal history of diseases of the skin and subcutaneous tissue     History of keloid of skin    Personal history of other diseases of the female genital tract 2014    History of vaginitis    Personal history of other diseases of the respiratory system 2013    History of pharyngitis   [2]   Past Surgical History:  Procedure Laterality Date    BREAST SURGERY  2013    Breast Surgery     SECTION, CLASSIC  2014     Section    KIDNEY STONE SURGERY      OTHER SURGICAL HISTORY  2013    Breast Surgery Puncture Aspiration Of Cyst    OTHER SURGICAL HISTORY  2021    Rhytidectomy    OTHER SURGICAL HISTORY  2013    Ear Surgery    OTHER SURGICAL HISTORY  2013    Surgery    RHINOPLASTY  2013    Rhinoplasty    TONSILLECTOMY  2014    Tonsillectomy   [3]   Social History  Tobacco Use    Smoking status: Never     Passive exposure: Never    Smokeless tobacco: Never   Vaping Use    Vaping status: Never Used   Substance Use Topics    Alcohol use: Yes     Alcohol/week: 2.0 standard drinks of alcohol     Types: 2 Glasses of wine per week    Drug use: Never   [4]   Family History  Problem Relation Name Age of Onset    Hypertension Father      Other (Mixed connective tissue disease) Son      Hypertension Paternal Grandmother      Cancer Paternal Grandfather      Heart disease Paternal Grandfather       Diabetes Other Family history     Cancer Other Family history     Coronary artery disease Other Family history     Other (Non-Hodgkin's Lymphoma) Other Family history    [5]   Allergies  Allergen Reactions    Amoxicillin Unknown    Ciprofloxacin Hives    Linalool Hives, Itching and Swelling    Penicillins Unknown    Sulfa (Sulfonamide Antibiotics) Unknown     Nausea

## 2025-06-23 ENCOUNTER — TELEPHONE (OUTPATIENT)
Dept: PRIMARY CARE | Facility: CLINIC | Age: 70
End: 2025-06-23
Payer: MEDICARE

## 2025-06-23 NOTE — TELEPHONE ENCOUNTER
Pt inquiring about infectious disease order being sent to [facility]. She would like to know if it was sent to their facility    advise

## 2025-06-25 DIAGNOSIS — B85.2 LICE INFESTATION: ICD-10-CM

## 2025-06-26 ENCOUNTER — HOSPITAL ENCOUNTER (EMERGENCY)
Facility: HOSPITAL | Age: 70
Discharge: HOME | End: 2025-06-26
Attending: EMERGENCY MEDICINE
Payer: MEDICARE

## 2025-06-26 VITALS
TEMPERATURE: 98 F | BODY MASS INDEX: 27.21 KG/M2 | HEART RATE: 74 BPM | HEIGHT: 59 IN | SYSTOLIC BLOOD PRESSURE: 152 MMHG | RESPIRATION RATE: 18 BRPM | OXYGEN SATURATION: 100 % | DIASTOLIC BLOOD PRESSURE: 60 MMHG | WEIGHT: 135 LBS

## 2025-06-26 DIAGNOSIS — S05.01XA ABRASION OF RIGHT CORNEA, INITIAL ENCOUNTER: Primary | ICD-10-CM

## 2025-06-26 PROCEDURE — 99283 EMERGENCY DEPT VISIT LOW MDM: CPT | Performed by: EMERGENCY MEDICINE

## 2025-06-26 PROCEDURE — 2500000001 HC RX 250 WO HCPCS SELF ADMINISTERED DRUGS (ALT 637 FOR MEDICARE OP)

## 2025-06-26 PROCEDURE — 2500000005 HC RX 250 GENERAL PHARMACY W/O HCPCS

## 2025-06-26 RX ORDER — OXYCODONE AND ACETAMINOPHEN 5; 325 MG/1; MG/1
1 TABLET ORAL EVERY 8 HOURS PRN
Qty: 9 TABLET | Refills: 0 | Status: SHIPPED | OUTPATIENT
Start: 2025-06-26 | End: 2025-06-29

## 2025-06-26 RX ORDER — KETOCONAZOLE 20 MG/ML
SHAMPOO, SUSPENSION TOPICAL 2 TIMES WEEKLY
Qty: 500 ML | Refills: 0 | Status: SHIPPED | OUTPATIENT
Start: 2025-06-26

## 2025-06-26 RX ORDER — ERYTHROMYCIN 5 MG/G
1 OINTMENT OPHTHALMIC NIGHTLY
Qty: 5 G | Refills: 0 | Status: SHIPPED | OUTPATIENT
Start: 2025-06-26

## 2025-06-26 RX ORDER — TETRACAINE HYDROCHLORIDE 5 MG/ML
1 SOLUTION OPHTHALMIC ONCE
Status: COMPLETED | OUTPATIENT
Start: 2025-06-26 | End: 2025-06-26

## 2025-06-26 RX ORDER — OXYCODONE AND ACETAMINOPHEN 5; 325 MG/1; MG/1
1 TABLET ORAL ONCE
Refills: 0 | Status: COMPLETED | OUTPATIENT
Start: 2025-06-26 | End: 2025-06-26

## 2025-06-26 RX ADMIN — OXYCODONE HYDROCHLORIDE AND ACETAMINOPHEN 1 TABLET: 5; 325 TABLET ORAL at 13:11

## 2025-06-26 RX ADMIN — TETRACAINE HYDROCHLORIDE 1 DROP: 5 SOLUTION OPHTHALMIC at 11:37

## 2025-06-26 RX ADMIN — FLUORESCEIN SODIUM 1 STRIP: 1 STRIP OPHTHALMIC at 11:36

## 2025-06-26 ASSESSMENT — VISUAL ACUITY
OD: 20/70
OS: 20/30
OU: 20/30

## 2025-06-26 ASSESSMENT — PAIN - FUNCTIONAL ASSESSMENT: PAIN_FUNCTIONAL_ASSESSMENT: 0-10

## 2025-06-26 ASSESSMENT — PAIN SCALES - GENERAL: PAINLEVEL_OUTOF10: 8

## 2025-06-26 NOTE — ED TRIAGE NOTES
PT is A/Ox4 coming in for right eye pain/injury. PT stated that she was cleaning her hair with a medicated lice formula and it got in her eye. PT tried to flush her eye out with water at home.

## 2025-06-26 NOTE — ED PROVIDER NOTES
HPI   CC: Eye Problem     Patient is a 69-year-old female with PMH hypertension presenting to the ED with concern for an eye problem.  Patient states she has lice and was using a new medicated lice shampoo around midnight last night.  When she did this a bit of the shampoo got into her right eye.  She immediately flushed out the eye and has been intermittently flushing out with water since then.  She has taken Benadryl and Aleve for the pain which has helped.  She is comfortable at rest only whenever she goes to move her eyes the pain flares up.  Denies any left eye concern.  Denies any photophobia.  States mild blurry vision in the right eye.  Denies glasses or contact use.  Denies previous eye pathology.          ROS: 10-point review of systems was performed and is otherwise negative except as noted in HPI.    Limitations to history: N/A  Independent Historians: Self   External Records Reviewed: Outpatient notes in EMR    Past Medical History: Noncontributory except per HPI     Past Surgical History: Noncontributory except per HPI     Family History: Reviewed and noncontributory     Social History:  Denies tobacco. Denies ETOH. Denies illicit drugs.    RX Allergies[1]    Home Meds:   Current Outpatient Medications   Medication Instructions    albuterol 90 mcg/actuation inhaler 2 puffs, inhalation, Every 4 hours PRN, Every 4 to 6 hours as needed for shortness of breath and wheezng    albuterol 2.5 mg, nebulization, Every 4 hours PRN    budesonide-formoterol (Symbicort) 160-4.5 mcg/actuation inhaler 2 puffs, inhalation, 2 times daily RT, Rinse mouth with water after use to reduce aftertaste and incidence of candidiasis. Do not swallow.    cholecalciferol (Vitamin D-3) 125 MCG (5000 UT) capsule 1 capsule, Daily    ciclopirox (Loprox) 0.77 % gel 1 Application, 2 times daily    erythromycin (Romycin) 5 mg/gram (0.5 %) ophthalmic ointment 1 Application, Right Eye, Nightly, Place a 1/2 inch ribbon of ointment into the  lower eyelid.    estradiol (ESTRACE) 1 g, vaginal, Every other day    ketoconazole (NIZOral) 2 % shampoo Topical, 2 times weekly    lamoTRIgine (LAMICTAL) 200 mg, oral, Daily    levocetirizine (XYZAL) 5 mg, oral, Every evening    lipase-protease-amylase 9,000-112,500- 112,500 unit capsule Take by mouth.    lisdexamfetamine (VYVANSE) 60 mg, oral, Every morning    lithium ER (LITHOBID) 300 mg, oral, Daily    metoprolol succinate XL (Toprol-XL) 25 mg 24 hr tablet TAKE 1/2 TABLET(12.5 MG) BY MOUTH ONCE DAILY    mirtazapine (REMERON) 7.5 mg, oral, Nightly    omeprazole (PRILOSEC) 20 mg, oral, Daily before breakfast    oxyCODONE-acetaminophen (Percocet) 5-325 mg tablet 1 tablet, oral, Every 8 hours PRN    Prolia 60 mg, subcutaneous, Every 6 months        Physical Exam     ED Triage Vitals [06/26/25 1022]   Temperature Heart Rate Respirations BP   36.7 °C (98 °F) 75 16 158/57      Pulse Ox Temp src Heart Rate Source Patient Position   99 % -- -- --      BP Location FiO2 (%)     -- --         Vitals and nursing note reviewed.   Physical Exam  Constitutional:       General: She is not in acute distress.     Appearance: Normal appearance. She is not ill-appearing, toxic-appearing or diaphoretic.      Comments: Sitting holding her head in her right hand with gauze patch over her right eye.  Patient appears to be in pain   HENT:      Head: Normocephalic and atraumatic.      Mouth/Throat:      Mouth: Mucous membranes are moist.      Pharynx: Oropharynx is clear. No oropharyngeal exudate or posterior oropharyngeal erythema.   Eyes:      General: Lids are normal. No visual field deficit or scleral icterus.        Right eye: No foreign body or discharge.         Left eye: No foreign body or discharge.      Extraocular Movements: Extraocular movements intact.      Right eye: Normal extraocular motion.      Left eye: Normal extraocular motion.      Conjunctiva/sclera:      Right eye: Right conjunctiva is injected.      Pupils: Pupils  are equal, round, and reactive to light.      Comments: PERRLA.  No pain with EOM.  No proptosis.  No periorbital swelling or tenderness.  No direct or consensual photophobia.  No limbal flush.    Conjunctival erythema on the right    Fluorescein stain with corneal abrasion to right eye as in photo below   Cardiovascular:      Rate and Rhythm: Normal rate and regular rhythm.      Heart sounds: Normal heart sounds. No murmur heard.     No friction rub. No gallop.   Pulmonary:      Effort: Pulmonary effort is normal. No respiratory distress.      Breath sounds: Normal breath sounds. No stridor. No wheezing, rhonchi or rales.   Abdominal:      General: Abdomen is flat. Bowel sounds are normal. There is no distension.      Palpations: Abdomen is soft.      Tenderness: There is no abdominal tenderness. There is no right CVA tenderness, left CVA tenderness, guarding or rebound.   Musculoskeletal:         General: Normal range of motion.      Cervical back: Normal range of motion and neck supple. No rigidity or tenderness.   Lymphadenopathy:      Cervical: No cervical adenopathy.   Skin:     General: Skin is warm and dry.      Capillary Refill: Capillary refill takes less than 2 seconds.   Neurological:      General: No focal deficit present.      Mental Status: She is alert and oriented to person, place, and time.   Psychiatric:         Mood and Affect: Mood normal.         Behavior: Behavior normal.         Diagnostic Results      Labs Reviewed   POCT PH PAPER         No orders to display       ED Course & MDM   Assessment/Plan:   Medications   fluorescein 1 mg ophthalmic strip 1 strip (1 strip Right Eye Given 6/26/25 1136)   tetracaine (PF) 0.5 % ophthalmic solution 1 drop (1 drop Right Eye Given 6/26/25 1137)   oxyCODONE-acetaminophen (Percocet) 5-325 mg per tablet 1 tablet (1 tablet oral Given 6/26/25 1311)      ED Course as of 06/26/25 1501   Thu Jun 26, 2025   2458 Patient is a 69-year-old female presenting to the  ED with concern for eye problem.  Vital signs are stable, patient is nontoxic-appearing.  Visual acuity 20/30 in the left eye and bilaterally and 20/70 in the right eye.  She was irrigated out thoroughly with 1 L of normal saline.  After irrigation patient said her eye pain has improved.  Fluorescein stain revealed corneal abrasion.  pH was between 7-8.  Patient states relief of pain after receiving tetracaine drops.  Suspect symptoms most likely secondary corneal abrasion.  Patient was staffed with attending physician Dr. Rodríguez.  She was given Percocet in ED and Percocet sent to pharmacy for pain control.  She was given erythromycin ointment.  Recommend follow-up PCP.  Patient is also extremely concerned about lice that she has in her scalp.  Patient given ketoconazole shampoo from Dr. Rodríguez.  Patient is appropriate for outpatient management.  Patient understands and is agreeable with plan. Patient told to return to ED for any new or worsening symptoms. [VS]      ED Course User Index  [VS] Brando Hansen PA-C         Diagnoses as of 06/26/25 1501   Abrasion of right cornea, initial encounter       ED Prescriptions       Medication Sig Dispense Start Date End Date Auth. Provider    erythromycin (Romycin) 5 mg/gram (0.5 %) ophthalmic ointment Apply 1 Application to right eye once daily at bedtime. Place a 1/2 inch ribbon of ointment into the lower eyelid. 5 g 6/26/2025 -- Johnathan Rodríguez MD    ketoconazole (NIZOral) 2 % shampoo Apply topically 2 times a week. 500 mL 6/26/2025 -- Johnathan Rodríguez MD    oxyCODONE-acetaminophen (Percocet) 5-325 mg tablet Take 1 tablet by mouth every 8 hours if needed for severe pain (7 - 10) for up to 3 days. 9 tablet 6/26/2025 6/29/2025 Johnathan Rodríguez MD            Chronic Medical Conditions Significantly Affecting Care:      Escalation of Care: Appropriate for outpatient management     Discussion of Management with Other Providers:  I discussed the patient/results  with: attending physician Dr. Rodríguez    Counseling: Spoke with the patient and discussed today´s findings, in addition to providing specific details for the plan of care and expected course.  Patient was given the opportunity to ask questions.    Discussed return precautions and importance of follow-up.  Advised to follow-up with PCP.  Advised to return to the ED for changing or worsening symptoms, new symptoms, complaint specific precautions, and precautions listed on the discharge paperwork.  Educated on the common potential side effects of medications prescribed.    I advised the patient that the emergency evaluation and treatment provided today doesn't end their need for medical care. It is very important that they follow-up with their primary care provider or other specialist as instructed.    The plan of care was mutually agreed upon with the patient. The patient and/or family were given the opportunity to ask questions. All questions asked today in the ED were answered to the best of my ability with today's information.    I specifically advised the patient to return to the ED for changing or worsening symptoms, worrisome new symptoms, or for any complaint specific precautions listed on the discharge paperwork.    Procedure  Procedures           [1]   Allergies  Allergen Reactions    Amoxicillin Unknown    Ciprofloxacin Hives    Linalool Hives, Itching and Swelling    Penicillins Unknown    Sulfa (Sulfonamide Antibiotics) Unknown     Nausea        Brando Hansen PA-C  06/26/25 0233

## 2025-06-28 ENCOUNTER — HOSPITAL ENCOUNTER (EMERGENCY)
Facility: HOSPITAL | Age: 70
Discharge: HOME | End: 2025-06-28
Attending: GENERAL PRACTICE
Payer: MEDICARE

## 2025-06-28 VITALS
TEMPERATURE: 98.6 F | HEIGHT: 59 IN | WEIGHT: 135 LBS | SYSTOLIC BLOOD PRESSURE: 134 MMHG | OXYGEN SATURATION: 99 % | RESPIRATION RATE: 18 BRPM | BODY MASS INDEX: 27.21 KG/M2 | DIASTOLIC BLOOD PRESSURE: 81 MMHG | HEART RATE: 76 BPM

## 2025-06-28 DIAGNOSIS — L29.9 SCALP PRURITUS: Primary | ICD-10-CM

## 2025-06-28 PROCEDURE — 99282 EMERGENCY DEPT VISIT SF MDM: CPT | Performed by: GENERAL PRACTICE

## 2025-06-28 PROCEDURE — 99281 EMR DPT VST MAYX REQ PHY/QHP: CPT

## 2025-06-28 SDOH — HEALTH STABILITY: MENTAL HEALTH: BEHAVIORAL HEALTH(WDL): WITHIN DEFINED LIMITS

## 2025-06-28 ASSESSMENT — PAIN SCALES - GENERAL
PAINLEVEL_OUTOF10: 0 - NO PAIN
PAINLEVEL_OUTOF10: 0 - NO PAIN

## 2025-06-28 ASSESSMENT — PAIN - FUNCTIONAL ASSESSMENT: PAIN_FUNCTIONAL_ASSESSMENT: 0-10

## 2025-06-29 NOTE — ED TRIAGE NOTES
Patient presents to the ED by POV from home for head itching. Patient states she started experience the head itching since April and has seen multiple doctors for complaint. During triage, patient has made multiple statements of thinking there is something inside her body making her itch. Has stated that there have been things coming out of her arms. Denies pain.

## 2025-06-30 ENCOUNTER — TELEPHONE (OUTPATIENT)
Dept: INFECTIOUS DISEASES | Facility: HOSPITAL | Age: 70
End: 2025-06-30
Payer: MEDICARE

## 2025-06-30 NOTE — TELEPHONE ENCOUNTER
Patient called with concern over parasites and/or scalp, and was wondering if our providers treated something like that and if we had any parasitic disease specialists. Notified patient that we don't have any specific parasitic disease specialists per se and that that would fall under the infectious disease specialty umbrella, and that her concern would be something that Dr. Guerra would have to go over, or if she the patient has any confirming labs to provide. Patient says that her scalp does look different compared to when she originally saw Dr. Guerra.    Notified patient I will be sending a message to Dr. Guerra for a follow-up call.

## 2025-07-01 NOTE — PROGRESS NOTES
Per infectious disease doctor, Dr. Mariana Guerra, patient is dealing with delusions of parasitosis. She has examined the patient, and has found no evidence of any type of parasite, egg implantation/infestation. She has also been examined by her primary, Dr. Myers, whom initiated treatment for infestation but there is no evidence as well.     Patient is currently on Lithium ER 300mg daily and Lamictal 200mg daily for Bipolar 2 disorder. Also on Vyvanse 60mg daily (probably will stop that medication).    Past medications: Mirtazapine 7.5mg, 15mg, Adderall ER 30mg, Clonidine 0.1mg, Concerta 27mg, Concerta 54mg, Cymbalta 60mg, Cymbalta 20mg, 30mg, 60mg, Lamictal 25mg, 100mg, 150mg, Latuda 20mg, Lexapro 10mg, Lithium 150mg, ER 450mg, Ativan 0.5mg PRN, Methylphenidate ER 27mg, 36mg, 54mg, Strattera 25mg, 60mg, Vyvanse 30mg, 40mg, 50mg, Zaleplon 10mg.     https://www.ncbi.nlm.nih.gov/books/HAC036084/

## 2025-07-06 NOTE — ED PROVIDER NOTES
HPI   Chief Complaint   Patient presents with    Itching    Psychiatric Evaluation       HPI: 69-year-old female presents for scalp pruritus.  This is a chronic issue that she has been dealing with for several years.  She has completed courses of ivermectin and permethrin as well as treatment for lice with only modest relief of her symptoms.  She is convinced that there are insects living under her skin and crawling out.  She has seen dermatology and her primary care physician several times for this issue.  She was urged to come to the ED for evaluation by her daughter for possible psychogenic causes of her symptoms.  The patient is denying any suicidal or homicidal ideations      Limitations to history: None  Independent Historians: Patient, daughter  External Records Reviewed: HIE, outpatient notes, inpatient notes  ------------------------------------------------------------------------------------------------------------------------------------------  ROS: a ten point review of systems was performed and was negative except as per HPI.  ------------------------------------------------------------------------------------------------------------------------------------------  PMH / PSH: as per HPI, otherwise reviewed in EMR  MEDS: as per HPI, otherwise reviewed in EMR  ALLERGIES: as per HPI, otherwise reviewed in EMR  SocH:  as per HPI, otherwise reviewed in EMR  FH:  as per HPI, otherwise reviewed in EMR  ------------------------------------------------------------------------------------------------------------------------------------------  Physical Exam:  VS: As documented in the triage note and EMR flowsheet from this visit was reviewed  General: Well appearing. No acute distress.   Eyes:  Extraocular movements grossly intact. No scleral icterus. No discharge  HEENT:  Normocephalic.  Atraumatic  Neck: Moves neck freely. No gross masses  CV: Regular rhythm. No murmurs, rubs or gallops   Resp: Clear to  auscultation bilaterally. No respiratory distress.    GI: Soft, no masses, nontender. No rebound tenderness or guarding  MSK: Symmetric muscle bulk. No deformities. No lower extremity edema.    Skin: Warm, dry, intact.  There is a small scab nearly directly on the top of her scalp that seems to be from vigorous itching.  No insects or other lesions noted  Neuro: No focal deficits.  A&O x3.   Psych: Patient seems mildly agitated but is alert and conversive  ------------------------------------------------------------------------------------------------------------------------------------------  Hospital Course / Medical Decision Making:  Independent Interpretations: N/A  EKG as interpreted by me: N/A    MDM: 69-year-old female presents for scalp pruritus.  Other than a small scab from vigorous itching I note no lesions or insects on her scalp.  She is denying suicidal and homicidal ideations.  I conducted shared decision-making with the patient and her daughter and offered to provide an ENT referral and conduct a trial of antibiotics for possible developing scalp cellulitis.  The patient does not want antibiotics and does not want to speak to psychiatry.  Since she is alert and oriented and denying any suicidal or homicidal ideations I do not feel that she warrants psychiatric hospitalization.  She wants to go home.  I did provide her with an ENT referral.  She will follow-up with ENT and her primary care physician and will return to the ER for any concerns    Discussion of Management with Other Providers:   I discussed the patient/results with: Emergency medicine team    Final diagnosis and disposition as below.                  Patient History   Medical History[1]  Surgical History[2]  Family History[3]  Social History[4]    Physical Exam   ED Triage Vitals [06/28/25 1955]   Temperature Heart Rate Respirations BP   37 °C (98.6 °F) 76 18 134/81      Pulse Ox Temp Source Heart Rate Source Patient Position   99 %  Temporal Monitor Sitting      BP Location FiO2 (%)     Left arm --       Physical Exam      ED Course & MDM   Diagnoses as of 25 1307   Scalp pruritus                 No data recorded                                 Medical Decision Making      Procedure  Procedures       [1]   Past Medical History:  Diagnosis Date    Asthma     Depression     Hypertension     Irritable bowel syndrome     Osteoporosis     Other conditions influencing health status 2014    Urinary Tract Infection    Otitis media, unspecified, left ear 2013    Otitis media of left ear    Papillomavirus as the cause of diseases classified elsewhere 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    Personal history of diseases of the skin and subcutaneous tissue 2015    History of cyst of breast    Personal history of diseases of the skin and subcutaneous tissue     History of keloid of skin    Personal history of other diseases of the female genital tract 2014    History of vaginitis    Personal history of other diseases of the respiratory system 2013    History of pharyngitis   [2]   Past Surgical History:  Procedure Laterality Date    BREAST SURGERY  2013    Breast Surgery     SECTION, CLASSIC  2014     Section    KIDNEY STONE SURGERY      OTHER SURGICAL HISTORY  2013    Breast Surgery Puncture Aspiration Of Cyst    OTHER SURGICAL HISTORY  2021    Rhytidectomy    OTHER SURGICAL HISTORY  2013    Ear Surgery    OTHER SURGICAL HISTORY  2013    Surgery    RHINOPLASTY  2013    Rhinoplasty    TONSILLECTOMY  2014    Tonsillectomy   [3]   Family History  Problem Relation Name Age of Onset    Hypertension Father      Other (Mixed connective tissue disease) Son      Hypertension Paternal Grandmother      Cancer Paternal Grandfather      Heart disease Paternal Grandfather      Diabetes Other Family history     Cancer Other Family history     Coronary artery disease Other Family  history     Other (Non-Hodgkin's Lymphoma) Other Family history    [4]   Social History  Tobacco Use    Smoking status: Never     Passive exposure: Never    Smokeless tobacco: Never   Vaping Use    Vaping status: Never Used   Substance Use Topics    Alcohol use: Yes     Alcohol/week: 2.0 standard drinks of alcohol     Types: 2 Glasses of wine per week    Drug use: Never        Vic Killian DO  07/06/25 0340

## 2025-07-08 ENCOUNTER — APPOINTMENT (OUTPATIENT)
Dept: BEHAVIORAL HEALTH | Facility: CLINIC | Age: 70
End: 2025-07-08
Payer: MEDICARE

## 2025-07-08 VITALS
TEMPERATURE: 98.1 F | BODY MASS INDEX: 26.85 KG/M2 | WEIGHT: 133.2 LBS | DIASTOLIC BLOOD PRESSURE: 94 MMHG | HEART RATE: 73 BPM | SYSTOLIC BLOOD PRESSURE: 153 MMHG | HEIGHT: 59 IN

## 2025-07-08 DIAGNOSIS — F43.10 COMPLEX POSTTRAUMATIC STRESS DISORDER: ICD-10-CM

## 2025-07-08 DIAGNOSIS — F90.2 ATTENTION DEFICIT HYPERACTIVITY DISORDER (ADHD), COMBINED TYPE: ICD-10-CM

## 2025-07-08 DIAGNOSIS — F41.1 GAD (GENERALIZED ANXIETY DISORDER): ICD-10-CM

## 2025-07-08 DIAGNOSIS — F31.81 BIPOLAR II DISORDER (MULTI): Primary | ICD-10-CM

## 2025-07-08 PROCEDURE — 99214 OFFICE O/P EST MOD 30 MIN: CPT | Performed by: NURSE PRACTITIONER

## 2025-07-08 PROCEDURE — 3077F SYST BP >= 140 MM HG: CPT | Performed by: NURSE PRACTITIONER

## 2025-07-08 PROCEDURE — 3008F BODY MASS INDEX DOCD: CPT | Performed by: NURSE PRACTITIONER

## 2025-07-08 PROCEDURE — 1160F RVW MEDS BY RX/DR IN RCRD: CPT | Performed by: NURSE PRACTITIONER

## 2025-07-08 PROCEDURE — 3080F DIAST BP >= 90 MM HG: CPT | Performed by: NURSE PRACTITIONER

## 2025-07-08 PROCEDURE — 1159F MED LIST DOCD IN RCRD: CPT | Performed by: NURSE PRACTITIONER

## 2025-07-08 RX ORDER — DIPHENHYDRAMINE HCL 25 MG
25 TABLET ORAL NIGHTLY PRN
COMMUNITY

## 2025-07-08 ASSESSMENT — ANXIETY QUESTIONNAIRES
IF YOU CHECKED OFF ANY PROBLEMS ON THIS QUESTIONNAIRE, HOW DIFFICULT HAVE THESE PROBLEMS MADE IT FOR YOU TO DO YOUR WORK, TAKE CARE OF THINGS AT HOME, OR GET ALONG WITH OTHER PEOPLE: NOT DIFFICULT AT ALL
7. FEELING AFRAID AS IF SOMETHING AWFUL MIGHT HAPPEN: NOT AT ALL
1. FEELING NERVOUS, ANXIOUS, OR ON EDGE: NOT AT ALL
GAD7 TOTAL SCORE: 1
3. WORRYING TOO MUCH ABOUT DIFFERENT THINGS: NOT AT ALL
5. BEING SO RESTLESS THAT IT IS HARD TO SIT STILL: NOT AT ALL
6. BECOMING EASILY ANNOYED OR IRRITABLE: SEVERAL DAYS
4. TROUBLE RELAXING: NOT AT ALL
2. NOT BEING ABLE TO STOP OR CONTROL WORRYING: NOT AT ALL

## 2025-07-08 ASSESSMENT — PATIENT HEALTH QUESTIONNAIRE - PHQ9
8. MOVING OR SPEAKING SO SLOWLY THAT OTHER PEOPLE COULD HAVE NOTICED. OR THE OPPOSITE, BEING SO FIGETY OR RESTLESS THAT YOU HAVE BEEN MOVING AROUND A LOT MORE THAN USUAL: NOT AT ALL
6. FEELING BAD ABOUT YOURSELF - OR THAT YOU ARE A FAILURE OR HAVE LET YOURSELF OR YOUR FAMILY DOWN: NOT AT ALL
7. TROUBLE CONCENTRATING ON THINGS, SUCH AS READING THE NEWSPAPER OR WATCHING TELEVISION: NOT AT ALL
4. FEELING TIRED OR HAVING LITTLE ENERGY: SEVERAL DAYS
9. THOUGHTS THAT YOU WOULD BE BETTER OFF DEAD, OR OF HURTING YOURSELF: NOT AT ALL
1. LITTLE INTEREST OR PLEASURE IN DOING THINGS: SEVERAL DAYS
10. IF YOU CHECKED OFF ANY PROBLEMS, HOW DIFFICULT HAVE THESE PROBLEMS MADE IT FOR YOU TO DO YOUR WORK, TAKE CARE OF THINGS AT HOME, OR GET ALONG WITH OTHER PEOPLE: NOT DIFFICULT AT ALL
2. FEELING DOWN, DEPRESSED OR HOPELESS: NOT AT ALL
5. POOR APPETITE OR OVEREATING: SEVERAL DAYS
3. TROUBLE FALLING OR STAYING ASLEEP OR SLEEPING TOO MUCH: NOT AT ALL

## 2025-07-08 NOTE — PROGRESS NOTES
"Outpatient Psychiatry    Subjective   Missy Patton, a 69 y.o. female, presenting to Psychiatry for evaluation.  Patient is referred by Stephen Myers MD \"I am doing good, but I wonder if medical people are talking with behavioral providers and I wanted to say that I don't have delusions of parasitosis. I had moths in my mother's closet that were exterminated but I have found the medical system to be judgmental and I didn't appreciate that they didn't take me seriously, that I have been dealing with this nonstop itching for the past 4 years. I am doing better now finally, but my doctor was puzzled by what happened and I was tried on different medications. I still have something happening at night, but there is less flaking. I am now upset with my daughter - as I have an estranged relationship with her for a long time. I told her that I was worried about taking a medication that was causing something to come out and she told me that she would take me to the hospital. As she was an NP, I thought she was going to advocate for me, and she wanted me to need a psych eval, and I said yes I did because I was so stressed out about things. I was so pissed at her as I wasn't having thoughts of wanting to harm or kill myself. I feel like she tricked me.\"      HPI:    Present Illness - Bipolar 2 disorder/treatment resistant depression, cPTSD     Characteristics/Recent psychiatric symptoms (pertinent positives and negatives) - admits the scalp issues were causing her severe discomfort physically with flaking and crusting pieces of her scalp coming off in 'chunks' and 'I didn't have thoughts of moth larvae in my head because of the moths in my house, but they were actually there. Right now, that pimple on my head is squishy and moving around on my scalp and pushing on it may exacerbate the feeling of something is there, but it certainly didn't create it.' Admits she feels alone that people are just not taking the time to " "actually listen to her concerns. Denies the concept that the chemicals, causing the burns and flaking in her hair were the cause of the sensations of what she was feeling. \"I really don't understand why people kept telling me why I had that diagnosis. I didn't need more stress in my life and I have no idea why people would actually say something that they would have parasites in their skin and scalp other than to call attention to themselves? I certainly don't want that.' Reports making effort to clean out the clothing in the closets that were infested with the moths - some were there thrown away, others were steamed and cleaned out and bagged away (her son helped her out, by coming over and dressing up in a hazmat suit). Denies outright any delusional thoughts. However admits to obsessively focused on her medical issues with her scalp/skin as she was in the midst of cleaning up her home and the closets and it stopped her 'work flow' and it really threw her off, and 'I do have a fascination with bugs but I certainly do not invite them to dinner.' Admits her anxiety has been well-controlled and keeps everything was kept in control. Reports some stress in her life - as the issues with her scalp 'had kept me stuck for a while there, and really frustrated me. I could not see my recently born grandson from 5 weeks ago. And I am so excited to see him,' but also having to clean her home, which was put on hold, and contending with her ill dog and her mother who had fallen recently. Reports not experiencing any mood swings. Sleep is on average 7 hrs a night and has no issues with falling/staying asleep and wakes up rested. Denies concerns with energy levels or mental/physical fatigue and appetite is fine. Reports in general, not experiencing any racing, obsessive, ruminating thoughts. \"I don't think I have been having any bad thoughts, otherwise I would report them to you immediately.' Denies concerns with attention or focus " issues. Still does see her EMDR trauma therapist but did them virtually as she didn't want to be there in person d/t respecting concerns with her infestation concerns. Still tries to maintain her boundaries with her mother as her mother manipulates others around her but her mother has been treating her nicely as she told the patient she is realizing how nice she has been to her lately and knows that with the changes in healthcare, she needs to treat her better.      Onset/timeframe - 6 weeks  Type - anxiety  Duration - daily  Aggravating and/or relieving factors/triggers - relationships with mother and daughter, and infestation (perceived and real) in her scalp that was causing her to be upset and feeling like no one was listening to her.  Treatment and treatment changes (new meds, dosage increases or decreases, med compliance, therapy frequency, etc.) (Past and Recent) - sees TARAN Goss for EMDR therapy    Issues: Reports blisters on her scalp are no longer there - on scalp and along her forehead. Feels the different medications/chemicals did help 'push' out the organisms that she felt were in her head and no longer present.    Reports the itching feeling on her skin has calmed down considerably but the itching itself started specifically 1.5 weeks ago. Even thought the underlying irritating problem had been there a lot longer.    Showed images during appt of finding lots of 'waxy larvae'.    Denies SI/HI/AVH currently.    She did not hit the full criteria for fixed delusional thinking as she feels the parasite sensation has lifted in the past 4 days and is gone now.    Psychiatric Review Of Systems:  Depressive Symptoms: anhedonia, appetite decreased, and energy  Manic Symptoms: negative  Anxiety Symptoms: General Anxiety Disorder (LYUDMILA)LYUDMILA Behaviors: irritability, Obsessive Compulsive Disorder (OCD)OCD Behaviors: repetitive thoughts, and Post Traumatic Stress Disorder (PTSD)PTSD: traumatic event, persistent  symptoms of increased arousal, irritability, and outbursts of anger  Psychotic Symptoms: negative  Other Symptoms:    Current Medications:    Current Outpatient Medications:     albuterol 2.5 mg /3 mL (0.083 %) nebulizer solution, Take 3 mL (2.5 mg) by nebulization every 4 hours if needed for wheezing or shortness of breath., Disp: 75 mL, Rfl: 5    albuterol 90 mcg/actuation inhaler, Inhale 2 puffs every 4 hours if needed for wheezing or shortness of breath. Every 4 to 6 hours as needed for shortness of breath and wheezng, Disp: 18 g, Rfl: 5    budesonide-formoterol (Symbicort) 160-4.5 mcg/actuation inhaler, Inhale 2 puffs 2 times a day. Rinse mouth with water after use to reduce aftertaste and incidence of candidiasis. Do not swallow., Disp: 10.2 g, Rfl: 11    cholecalciferol (Vitamin D-3) 125 MCG (5000 UT) capsule, Take 1 capsule (125 mcg) by mouth once daily., Disp: , Rfl:     ciclopirox (Loprox) 0.77 % gel, Apply 1 Application topically 2 times a day., Disp: , Rfl:     denosumab (Prolia) 60 mg/mL syringe, Inject 1 mL (60 mg total) under the skin every 6 months., Disp: 1 mL, Rfl: 1    erythromycin (Romycin) 5 mg/gram (0.5 %) ophthalmic ointment, Apply 1 Application to right eye once daily at bedtime. Place a 1/2 inch ribbon of ointment into the lower eyelid., Disp: 5 g, Rfl: 0    estradiol (Estrace) 0.01 % (0.1 mg/gram) vaginal cream, Insert 0.25 Applicatorfuls (1 g) into the vagina every other day., Disp: 45 g, Rfl: 3    ketoconazole (NIZOral) 2 % shampoo, Apply topically 2 times a week., Disp: 500 mL, Rfl: 0    lamoTRIgine (LaMICtal) 200 mg tablet, Take 1 tablet (200 mg) by mouth once daily., Disp: 90 tablet, Rfl: 3    levocetirizine (Xyzal) 5 mg tablet, Take 1 tablet (5 mg) by mouth once daily in the evening. (Patient taking differently: Take 1 tablet (5 mg) by mouth as needed at bedtime for allergies.), Disp: 30 tablet, Rfl: 11    lipase-protease-amylase 9,000-112,500- 112,500 unit capsule, Take by mouth.,  Disp: , Rfl:     lisdexamfetamine (Vyvanse) 60 mg capsule, Take 1 capsule (60 mg) by mouth once daily in the morning., Disp: 30 capsule, Rfl: 0    lithium ER (Lithobid) 300 mg 12 hr tablet, Take 1 tablet (300 mg) by mouth once daily., Disp: 90 tablet, Rfl: 3    metoprolol succinate XL (Toprol-XL) 25 mg 24 hr tablet, TAKE 1/2 TABLET(12.5 MG) BY MOUTH ONCE DAILY, Disp: 90 tablet, Rfl: 1    mirtazapine (Remeron) 7.5 mg tablet, Take 1 tablet (7.5 mg) by mouth once daily at bedtime., Disp: 90 tablet, Rfl: 3    omeprazole (PriLOSEC) 20 mg DR capsule, Take 1 capsule (20 mg) by mouth once daily in the morning. Take before meals., Disp: 90 capsule, Rfl: 3    diphenhydrAMINE (Sominex) 25 mg tablet, Take 1 tablet (25 mg) by mouth as needed at bedtime for itching or allergies., Disp: , Rfl:     Current Facility-Administered Medications:     ciclopirox (Penlac) 8 % solution, , Topical, Once, Stephen Myers MD    denosumab (Prolia) injection 60 mg, 60 mg, subcutaneous, q6 months, Sydnee Walker MD, 60 mg at 07/15/24 1446    Medical History:  Past Medical History:   Diagnosis Date    ADHD (attention deficit hyperactivity disorder)     Asthma     Depression     Hypertension     Irritable bowel syndrome     Osteoporosis     Other conditions influencing health status 11/18/2014    Urinary Tract Infection    Otitis media, unspecified, left ear 05/31/2013    Otitis media of left ear    Papillomavirus as the cause of diseases classified elsewhere 11/18/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    Personal history of diseases of the skin and subcutaneous tissue 12/07/2015    History of cyst of breast    Personal history of diseases of the skin and subcutaneous tissue     History of keloid of skin    Personal history of other diseases of the female genital tract 11/18/2014    History of vaginitis    Personal history of other diseases of the respiratory system 05/31/2013    History of pharyngitis    PTSD (post-traumatic stress disorder)        Substance  Use History:  Tobacco use: denies  Use of alcohol: occasional, social use  Use of caffeine: coffee 1 /day  Use of other substances: denies  Legal consequences of substance use: n/a  Substance use disorder treatment: n/a    Record Review: brief     Medical Review Of Systems:  Behavioral/Psych: positive for anxiety    OARRS:  Vasquez Masters, APRN-CNP on 7/8/2025 12:13 PM  I have personally reviewed the OARRS report for Missy Patton. I have considered the risks of abuse, dependence, addiction and diversion    Is the patient prescribed a combination of a benzodiazepine and opioid?  No    Last Urine Drug Screen / ordered today: Yes  Recent Results (from the past 8760 hours)   Amphetamine Confirm, Urine    Collection Time: 02/28/25  3:53 PM   Result Value Ref Range    AMPHETAMINE >4000 (H) <200 ng/mL    METHAMPHETAMINE NEGATIVE <200 ng/mL    PHENTERMINE NEGATIVE <200 ng/mL    MDA NEGATIVE <200 ng/mL    MDMA NEGATIVE <200 ng/mL    MDEA NEGATIVE <200 ng/mL     Results are as expected.         Controlled Substance Agreement:  Date of the Last Agreement: 07/08/2025  Reviewed Controlled Substance Agreement including but not limited to the benefits, risks, and alternatives to treatment with a Controlled Substance medication(s).    Stimulants:   What is the patient's goal of therapy? Stable ADHD  Is this being achieved with current treatment? yes    Activities of Daily Living:   Is your overall impression that this patient is benefiting (symptom reduction outweighs side effects) from stimulant therapy? Yes     1. Physical Functioning: Same  2. Family Relationship: Worse  3. Social Relationship: Same  4. Mood: Same  5. Sleep Patterns: Same  6. Overall Function: Same      Objective   Mental Status Exam  Appearance: Well-groomed  Attitude: Calm, cooperative, at times distracted by her phone, but engaged in conversation otherwise.  Behavior: Appropriate eye contact.   Motor Activity: No psychomotor agitation or retardation.  No abnormal movements, tremors or tics. No evidence of extrapyramidal symptoms or tardive dyskinesia.  Speech: Regular rate, rhythm, volume. Perseverative at times but redirectable and able to answer appropriately  Mood: 'good'  Affect: Euthymic, full range, slightly constricted at times, but otherwise mood congruent.  Thought Process: Linear, logical, and goal-directed, somewhat perseverative, flight of ideas. No loose associations or gross thought disorganization.  Thought Content: Denied current suicidal ideation or thoughts of harm to self, denied homicidal ideation or thoughts of harm to others. No delusional thinking elicited. No perseverations or obsessions identified. Worried about infestation that has since started to resolve itself but more upset that others did not take the time to listen to her, but only heard what she was saying. Family dynamics continues to be filled with constant conflicts.  Perception: Did not endorse auditory or visual hallucinations, did not appear to be responding to hallucinatory stimuli.   Cognition: Alert, oriented x3. Preserved attention span and concentration, recent and remote memory. Adequate fund of knowledge. No deficits in language.   Insight: Intact, in regards to understanding mental health condition  Judgement: Fair    LYUDMILA-7/PHQ-9 scores reviewed: 1, 3 compared to 4, 0 reflecting still well-controlled anxiety and depression/mood.     Vitals:  Vitals:    07/08/25 1108   BP: (!) 153/94   Pulse: 73   Temp: 36.7 °C (98.1 °F)         Other Objective Information:  No visits with results within 2 Month(s) from this visit.   Latest known visit with results is:   Orders Only on 03/03/2025   Component Date Value Ref Range Status    LITHIUM 03/03/2025 0.5 (L)  0.6 - 1.2 mmol/L Final       Vyvanse/Amphetamine is WNL.  Lithium level is 0.4 just shy of WNL (0.5-1.0) but patient indicated she is fine with the levels as this is where it has always resided for her and feels stable on  current dose.      Risk Assessment:  Risk of harm to self: Low Risk -- Risk factors include: No significant risk factors identified on screening Protective factors include:Denies current suicidal ideation and Denies history of suicide attempts     Risk of harm to others: Low Risk - Risk factors include: No significant risk factors identified on screening. Protective factors include: Lack of known history of harm to others  and Lack of known history of violent ideation     PSYCHOTHERAPY:  Plan: goals, type of therapy/modality, mental status when leaving, dx, time, none     There are no diagnoses linked to this encounter.       Plan/Recommendations:  Medications: Continues taking Vyvanse 60mg every day, Lamictal 200mg every day, Lithium ER 300mg every day, Mirtazapine (Remeron) 7.5mg at bedtime.  Orders: Pleasant and cooperative. Discussed situation with infestation in her hair - perceived vs real and she feels that her situation was based on others not taking the time to listen to her vs hearing her concerns/complaints and just being pushed aside. Talked about the concerns of the medications/chemicals being put into her hair/scalp that was burning her scalp and tricking and stimulating her brain, and thought patterns into thinking there was  something there, which she emphatically denied. Currently reports the sensations and issues are already clearing up and is no longer reporting an issue with her scalp, so cannot call her issue as delusional, nor require a pink slip to be engaged. Engaging and cooperative. Reviewed and agreed that no changes to medications or to treatment plan were needed at this time, although the discussion to adding on Abilify was briefly mentioned, but shut down. Signed updated CSA.  Follow up: 10/07  Call  Psychiatry at [phone] with issues.  For [facility] residents, [employer] is a 24/7 hotline you can call for assistance [[phone]]. Please call [phone]/480 or go to your  closest Emergency Room if you feel unsafe. This includes thoughts of hurting yourself or anyone else, or having other troubles such as hearing voices, seeing visions, or having new and scary thoughts about the people around you.    Review with patient: Treatment plan reviewed with the patient.  Medication risks/benefit reviewed with the patient  Time Spent:    Prep time: 1 min.  Direct patient time: 30 min.  Documentation time: 7 min.  Total time: 38 min.    Vasquez Masters, APRN-CNP

## 2025-07-09 RX ORDER — LISDEXAMFETAMINE DIMESYLATE 60 MG/1
60 CAPSULE ORAL EVERY MORNING
Qty: 30 CAPSULE | Refills: 0 | Status: SHIPPED | OUTPATIENT
Start: 2025-07-11 | End: 2025-08-10

## 2025-07-09 RX ORDER — LISDEXAMFETAMINE DIMESYLATE 60 MG/1
60 CAPSULE ORAL EVERY MORNING
Qty: 30 CAPSULE | Refills: 0 | Status: SHIPPED | OUTPATIENT
Start: 2025-09-09 | End: 2025-10-09

## 2025-07-09 RX ORDER — LISDEXAMFETAMINE DIMESYLATE 60 MG/1
60 CAPSULE ORAL EVERY MORNING
Qty: 30 CAPSULE | Refills: 0 | Status: SHIPPED | OUTPATIENT
Start: 2025-08-10 | End: 2025-09-09

## 2025-07-17 ENCOUNTER — OFFICE VISIT (OUTPATIENT)
Dept: OPHTHALMOLOGY | Age: 70
End: 2025-07-17
Payer: MEDICARE

## 2025-07-17 DIAGNOSIS — S05.01XS ABRASION OF RIGHT CORNEA, SEQUELA: Primary | ICD-10-CM

## 2025-07-17 DIAGNOSIS — H25.13 NUCLEAR SENILE CATARACT OF BOTH EYES: ICD-10-CM

## 2025-07-17 PROCEDURE — 99203 OFFICE O/P NEW LOW 30 MIN: CPT | Performed by: OPHTHALMOLOGY

## 2025-07-17 ASSESSMENT — VISUAL ACUITY
CORRECTION_TYPE: GLASSES
OS_CC: 20/20
OS_CC+: -1
METHOD: SNELLEN - LINEAR
OD_CC: 20/20
OD_CC+: -1

## 2025-07-17 ASSESSMENT — ENCOUNTER SYMPTOMS
CONSTITUTIONAL NEGATIVE: 0
RESPIRATORY NEGATIVE: 0
GASTROINTESTINAL NEGATIVE: 0
ALLERGIC/IMMUNOLOGIC NEGATIVE: 0
PSYCHIATRIC NEGATIVE: 0
MUSCULOSKELETAL NEGATIVE: 0
NEUROLOGICAL NEGATIVE: 0
ENDOCRINE NEGATIVE: 0
EYES NEGATIVE: 1
HEMATOLOGIC/LYMPHATIC NEGATIVE: 0
CARDIOVASCULAR NEGATIVE: 0

## 2025-07-17 ASSESSMENT — REFRACTION_MANIFEST
OS_ADD: +2.50
OD_SPHERE: +1.00
OS_SPHERE: +0.75
OD_CYLINDER: -2.00
OS_AXIS: 098
OD_ADD: +2.50
OS_CYLINDER: -1.00
OD_AXIS: 080

## 2025-07-17 ASSESSMENT — CUP TO DISC RATIO
OD_RATIO: .25
OS_RATIO: .25

## 2025-07-17 ASSESSMENT — TONOMETRY
OD_IOP_MMHG: 15
OS_IOP_MMHG: 15
IOP_METHOD: GOLDMANN APPLANATION

## 2025-07-17 ASSESSMENT — REFRACTION_WEARINGRX
OS_CYLINDER: -1.00
SPECS_TYPE: BIFOCAL
OS_ADD: +2.50
OS_AXIS: 095
OD_ADD: +2.50
OD_CYLINDER: -2.00
OS_SPHERE: +1.00
OD_AXIS: 085
OD_SPHERE: +1.50

## 2025-07-17 ASSESSMENT — EXTERNAL EXAM - LEFT EYE: OS_EXAM: NORMAL

## 2025-07-17 ASSESSMENT — EXTERNAL EXAM - RIGHT EYE: OD_EXAM: NORMAL

## 2025-07-17 ASSESSMENT — SLIT LAMP EXAM - LIDS
COMMENTS: DERMATOCHALASIS
COMMENTS: DERMATOCHALASIS

## 2025-07-17 NOTE — PROGRESS NOTES
Assessment/Plan   Diagnoses and all orders for this visit:  Abrasion of right cornea, sequela    No epithelial defect seen on exam today.    No pain or irritation    Nuclear senile cataract of both eyes    Mild, not visually significant.    Plan:  New glasses RX given.  See in 1 year.

## 2025-07-21 ENCOUNTER — TELEPHONE (OUTPATIENT)
Facility: CLINIC | Age: 70
End: 2025-07-21

## 2025-07-21 ENCOUNTER — APPOINTMENT (OUTPATIENT)
Facility: CLINIC | Age: 70
End: 2025-07-21
Payer: MEDICARE

## 2025-07-23 DIAGNOSIS — M81.0 OSTEOPOROSIS, UNSPECIFIED OSTEOPOROSIS TYPE, UNSPECIFIED PATHOLOGICAL FRACTURE PRESENCE: ICD-10-CM

## 2025-07-23 RX ORDER — DENOSUMAB 60 MG/ML
60 INJECTION SUBCUTANEOUS
Qty: 1 ML | Refills: 0 | Status: SHIPPED | OUTPATIENT
Start: 2025-07-23

## 2025-08-07 ENCOUNTER — EVALUATION (OUTPATIENT)
Dept: PHYSICAL THERAPY | Facility: CLINIC | Age: 70
End: 2025-08-07
Payer: MEDICARE

## 2025-08-07 ENCOUNTER — APPOINTMENT (OUTPATIENT)
Dept: ALLERGY | Facility: CLINIC | Age: 70
End: 2025-08-07
Payer: MEDICARE

## 2025-08-07 DIAGNOSIS — M54.16 LUMBAR RADICULOPATHY: Primary | ICD-10-CM

## 2025-08-07 PROCEDURE — 97161 PT EVAL LOW COMPLEX 20 MIN: CPT | Mod: GP

## 2025-08-07 PROCEDURE — 97110 THERAPEUTIC EXERCISES: CPT | Mod: GP

## 2025-08-07 NOTE — SIGNIFICANT EVENT
Instructed occasional slow but full lumbar extension in standing following prolong sitting or sitting longer than 15 minutes at a time. Trouble shot through body mechanics while guardening, avoiding deep squatting positions or twisting

## 2025-08-07 NOTE — PROGRESS NOTES
Physical Therapy Evaluation and Treatment    Patient Name: Missy Patton  MRN: [id number]  YOB: 1955  Encounter Date: 8/7/2025    Time Entry:  Time Calculation  Start Time: 1030  Stop Time: 1115  Time Calculation (min): 45 min  PT Evaluation Time Entry  PT Evaluation (Low) Time Entry: 40                      Rehab Insurance Information:   Visit Count: 1  Auth Required: Yes        Additional Authorization/Insurance Information: 08/06/2025 [facility]: AUTH REQ/ 50.00 CO PAY/ 100% C[place]AGE/ [place]/ 5900 OOP-NM     Rehab Falls Risk Assessment:  Fall Risk Indicated: No    Problem List Items Addressed This Visit           ICD-10-CM       Neuro    Lumbar radiculopathy - Primary M54.16    Relevant Orders    Follow Up In Physical Therapy       Precautions  Right Lower Extremity Weight-Bearing Status: Full weight-bearing  Left Lower Extremity Weight-Bearing Status: Full weight-bearing         Subjective   Patient Education  Do you or those around you need extra help because of problems with hearing, speaking, seeing, moving around, or learning?: No  Are you comfortable filing out medical forms?: Yes  Key Learner  Key Learner: Patient  History of Present Illness  Missy is a 69 y.o. female who reports to physical therapy with a chief concern of Intermittent pain effecting back of right to above ankle.     The patient reports a medical diagnosis of lumbar radiculopathy.  Patient reports a surgery of NA.  Diagnostic tests related to this condition: None.        History of Present Condition/Illness: Intermittent pain effecting posterior right leg to ankle, which started on May 19th, 2019. Don't know what caused it. Since May, pain is less intense and probably less frequent because I changed some of my activities. I don't do squats any more because I am afraid of pain. I don't sit on couch but on firm and more supportive chair now. I try not to bend at the wait. I am not doing much gardening this summer    Activities  of Daily Living  Social history was obtained from Patient.    General Prior Level of Function Comments: independent  General Current Level of Function Comments: independently  Patient Roles: Other (Comment)  Other Patient Roles and Responsibilities: patient lives alone    Previously independent with activities of daily living? Yes     Currently independent with activities of daily living? Yes          Previously independent with instrumental activities of daily living? Yes     Currently independent with instrumental activities of daily living? Yes              Review of Systems    Additional Review of Systems Details: WNL  Additional Red Flag Details: NA    Treatment History  Previously Received Treatments: Yes  Previous Treatments: Physical therapy    Currently Receiving Treatments: No         Living Arrangements  Living Situation  Housing: Home independently  Living Arrangements: Alone    Home Setup  Type of Structure: House  Home Access: Stairs with rails  Number of Levels in Home: Two level  Access to Alternate Level of Home: Stairs with rails      Education/Employment       Employment Status: Retired            Objective   Delgado: 3  Vital Signs  LMP  (LMP Unknown)            NA      Bracing      NA      Posture                 Slight reduction in cervical and lumbar lordosis    Cranial Nerve Exam  Intact: I: Olfactory, II: Optic, III, IV, VI: Oculomotor, Trochlear, Abducens, V: Trigeminal, VII: Facial, VIII: Auditory, IX,X: Glossopharyngeal, Vagus, XI: Accessory, and XII: Hypoglossal          Cervical Thoracic Sensation  General Cervical/Thoracic Sensation  Intact: Right and Left  Right Cervical/Thoracic Sensation  Intact: Light Touch, Static Two Point Discrimination, Dynamic Two Point Discrimination, Sharp/Dull Discrimination, Kinesthesia, and Proprioception       Left Cervical/Thoracic Sensation  Intact: Light Touch, Static Two Point Discrimination, Dynamic Two Point Discrimination, Sharp/Dull Discrimination,  "Kinesthesia, and Proprioception            Lower Extremity Sensation  General Lumbar/Lower Extremity Sensation  Intact: Right and Left  Right Lumbar/Lower Extremity Sensation  Intact: Light Touch, Sharp/Dull Discrimination, Static Two Point Discrimination, Dynamic Two Point Discrimination, Kinesthesia, and Proprioception  Right Lumbar/Lower Extremity Sensation Stocking Glove Pattern: No    Left Lumbar/Lower Extremity Sensation  Intact: Light Touch, Static Two Point Discrimination, Dynamic Two Point Discrimination, Sharp/Dull Discrimination, Kinesthesia, and Proprioception  Left Lumbar/Lower Extremity Sensation Stocking Glove Pattern: No           Lower Extremity Reflex Details  NA    Spinal Mobility  Normal: Thoracic  Hypomobile: Cervical and Lumbosacral  Lumbosacral Mobility Details: See ROM above        Lumbar Range of Motion   Lumbar AROM; flexion - WNL, extension - mild loss, bilateral lateral flexion - mild loss, bilateral rotation - mild loss. All motions produce mild \"pulling\"sensation along posterior lateral right leg to Calf muscle     Hip Range of Motion    WNL        Lumbar Strength   Strength Pain   Flexion 5     Extension 5     Right Lateral Flexion 5     Left Lateral Flexion 5     Right Rotation 5     Left Rotation 5                      Transfers/Bed Mobility Details  WNL      Ambulation Assistance Required  Surface With  Assistive Device Without Assistive Device Details   Level Independent Independent      Uneven Independent Independent     Curb Independent         Stairs Assistance Required   Assistance Level Upper Extremity Support Pattern   Ascending Independent Without rails     Descending Independent Without rails          Ambulation Details    Normal gait demonstrated     Gait Analysis  Base of Support: Normal            Gait Analysis Details  Normal gait cycle and speed demonstrated            Activities   Therapeutic Exercise  Therapeutic Exercise Performed: No                                "                   Other Activity  Other Activity Performed: Yes  Other Activity 1: instructed/educated sitting posture with proper lumbar spine support  Other Activity 2: Instructed lifting/squatting body mechanics while maintaining neutral lumbar spine    Assessment/Plan         Physical Therapy Evaluation and Treatment    Patient Name: Missy Patton  MRN: [id number]  YOB: 1955  Encounter Date: 8/7/2025    Time Entry:  Time Calculation  Start Time: 1030  Stop Time: 1115  Time Calculation (min): 45 min  PT Evaluation Time Entry  PT Evaluation (Low) Time Entry: 40                      Rehab Insurance Information:   Visit Count: 1  Auth Required: Yes        Additional Authorization/Insurance Information: 08/06/2025 [facility]: AUTH REQ/ 50.00 CO PAY/ 100% C[place]AGE/ [place]/ 5900 OOP-NM     Rehab Falls Risk Assessment:  Fall Risk Indicated: No    Problem List Items Addressed This Visit           ICD-10-CM       Neuro    Lumbar radiculopathy - Primary M54.16    Relevant Orders    Follow Up In Physical Therapy       Precautions  Right Lower Extremity Weight-Bearing Status: Full weight-bearing  Left Lower Extremity Weight-Bearing Status: Full weight-bearing         Subjective   Patient Education  Do you or those around you need extra help because of problems with hearing, speaking, seeing, moving around, or learning?: No  Are you comfortable filing out medical forms?: Yes  Key Learner  Key Learner: Patient  History of Present Illness  Missy is a 69 y.o. female who reports to physical therapy with a chief concern of Intermittent pain effecting back of right to above ankle.     The patient reports a medical diagnosis of lumbar radiculopathy.  Patient reports a surgery of NA.  Diagnostic tests related to this condition: None.        History of Present Condition/Illness: Intermittent pain effecting posterior right leg to ankle, which started on May 19th, 2019. Don't know what caused it. Since May, pain is less  intense and probably less frequent because I changed some of my activities. I don't do squats any more because I am afraid of pain. I don't sit on couch but on firm and more supportive chair now. I try not to bend at the wait. I am not doing much gardening this summer    Activities of Daily Living  Social history was obtained from Patient.    General Prior Level of Function Comments: independent  General Current Level of Function Comments: independently  Patient Roles: Other (Comment)  Other Patient Roles and Responsibilities: patient lives alone    Previously independent with activities of daily living? Yes     Currently independent with activities of daily living? Yes          Previously independent with instrumental activities of daily living? Yes     Currently independent with instrumental activities of daily living? Yes              Review of Systems    Additional Review of Systems Details: WNL  Additional Red Flag Details: NA    Treatment History  Previously Received Treatments: Yes  Previous Treatments: Physical therapy    Currently Receiving Treatments: No         Living Arrangements  Living Situation  Housing: Home independently  Living Arrangements: Alone    Home Setup  Type of Structure: House  Home Access: Stairs with rails  Number of Levels in Home: Two level  Access to Alternate Level of Home: Stairs with rails      Education/Employment       Employment Status: Retired            Objective   Delgado: 3  Vital Signs  LMP  (LMP Unknown)            NA      Bracing      NA      Posture                 Slight reduction in cervical and lumbar lordosis    Cranial Nerve Exam  Intact: I: Olfactory, II: Optic, III, IV, VI: Oculomotor, Trochlear, Abducens, V: Trigeminal, VII: Facial, VIII: Auditory, IX,X: Glossopharyngeal, Vagus, XI: Accessory, and XII: Hypoglossal          Cervical Thoracic Sensation  General Cervical/Thoracic Sensation  Intact: Right and Left  Right Cervical/Thoracic Sensation  Intact: Light  "Touch, Static Two Point Discrimination, Dynamic Two Point Discrimination, Sharp/Dull Discrimination, Kinesthesia, and Proprioception       Left Cervical/Thoracic Sensation  Intact: Light Touch, Static Two Point Discrimination, Dynamic Two Point Discrimination, Sharp/Dull Discrimination, Kinesthesia, and Proprioception            Lower Extremity Sensation  General Lumbar/Lower Extremity Sensation  Intact: Right and Left  Right Lumbar/Lower Extremity Sensation  Intact: Light Touch, Sharp/Dull Discrimination, Static Two Point Discrimination, Dynamic Two Point Discrimination, Kinesthesia, and Proprioception  Right Lumbar/Lower Extremity Sensation Stocking Glove Pattern: No    Left Lumbar/Lower Extremity Sensation  Intact: Light Touch, Static Two Point Discrimination, Dynamic Two Point Discrimination, Sharp/Dull Discrimination, Kinesthesia, and Proprioception  Left Lumbar/Lower Extremity Sensation Stocking Glove Pattern: No           Lower Extremity Reflex Details  NA    Spinal Mobility  Normal: Thoracic  Hypomobile: Cervical and Lumbosacral  Lumbosacral Mobility Details: See ROM above        Lumbar Range of Motion   Lumbar AROM; flexion - WNL, extension - mild loss, bilateral lateral flexion - mild loss, bilateral rotation - mild loss. All motions produce mild \"pulling\"sensation along posterior lateral right leg to Calf muscle     Hip Range of Motion    WNL        Lumbar Strength   Strength Pain   Flexion 5     Extension 5     Right Lateral Flexion 5     Left Lateral Flexion 5     Right Rotation 5     Left Rotation 5                      Transfers/Bed Mobility Details  WNL      Ambulation Assistance Required  Surface With  Assistive Device Without Assistive Device Details   Level Independent Independent      Uneven Independent Independent     Curb Independent         Stairs Assistance Required   Assistance Level Upper Extremity Support Pattern   Ascending Independent Without rails     Descending Independent Without " rails          Ambulation Details    Normal gait demonstrated     Gait Analysis  Base of Support: Normal            Gait Analysis Details  Normal gait cycle and speed demonstrated            Activities   Therapeutic Exercise  Therapeutic Exercise Performed: No                                                  Other Activity  Other Activity Performed: Yes  Other Activity 1: instructed/educated sitting posture with proper lumbar spine support  Other Activity 2: Instructed lifting/squatting body mechanics while maintaining neutral lumbar spine    Assessment/Plan   Assessment  Missy presents with a condition of Low complexity.   Presentation of Symptoms: Stable  Will Comorbidities Impact Care: No       Functional Limitations: Activity tolerance  Other Functional Limitations: pain prevents me from doing regular activities at home, I am affraid  Personal Factors Affecting Prognosis: Pain    Patient Goal for Therapy (PT): to resolve and prevent new intense symptoms  Prognosis: Good  Assessment Details: Patient presents with hx of lumbar radiculopathy effecting right posterior and lateral leg from hip to Calf muscle. Patient currently with minimal  to no symptoms. She demo fair and functional ROM, however, repeated movements of lumbar spine produce mild residual pulling along right posterior thigh and leg. Patient has not resumed her exercises, heavier house work or gardening, in fear that it may trigger intense pain again. Patient hopes to regain full mobility without further pain. Skilled PT would be beneficial in reaching patient's goal. POC discussed in details, with plan to schedule couple of follow ups upon receiving insurance authorization for follow up visits. Provisional classification is resolving lumbar derangement syndrome.     Goals  Active       PT Goal 1       Start:  08/07/25    Expected End:  11/03/25       Patient will reduce difficulties related to functional mobilities and quality of life, as evident  by Modified Oswestry score to 0         PT Goal 2       Start:  08/07/25    Expected End:  09/01/25       Patient will report reduced/abolished pain in right posterior leg, indicated by grade of 0-1 on 0-10 pain scale during all activities          PT Goal 3       Start:  08/07/25    Expected End:  09/01/25       Patient will demonstrated improved ROM of lumbar spine, indicated by goniometric measure WNL, while not producing radicular symptoms along right leg          PT Goal 5       Start:  08/07/25    Expected End:  11/03/25       Report readiness with usual household activities and hobbies          Patient Stated Goal 1       Start:  08/07/25    Expected End:  11/03/25       Patient will demonstrated knowledge of HEP, its maintenance frequency, and associated benefits          PT Goal 1       Start:  08/07/25    Expected End:  11/03/25            PT Goal 2       Start:  08/07/25    Expected End:  10/03/25            PT Goal 3       Start:  08/07/25    Expected End:  10/03/25            PT Goal 4       Start:  08/07/25    Expected End:  10/03/25            Patient Stated Goal 1       Start:  08/07/25    Expected End:  11/03/25             Education  Education was done with Patient. The patient's learning style includes Demonstration and Listening. The patient Demonstrates understanding and Verbalizes understanding.              Plan  From a physical therapy perspective, the patient would benefit from: Skilled Rehab Services    Planned therapy interventions include: Therapeutic exercise, Therapeutic activities, and Manual therapy.                  Other/tapered frequency details: Patient will schedule 2 follow ups, or more as needed and based on present symptoms. Authorization required    This plan was discussed with Patient.   Discussion participants: Agreed Upon Plan of Care  Plan details: Patient will schedule 2 follow ups or more, as needed, following insurance authorization of requested visits          Insurance Authorization Information  Date of Evaluation: 8/7/2025  Billable Treatment performed today: Yes  Onset Date: 5/30/2025  Referring Physician: Sherri Dietz   Surgery in the Last 3 months:  no  CPT Codes: Therapeutic Exercise (11380), Manual Therapy (18942), and Therapeutic Activity (85469)  Diagnosis:   Problem List Items Addressed This Visit           ICD-10-CM       Neuro    Lumbar radiculopathy - Primary M54.16    Relevant Orders    Follow Up In Physical Therapy        Functional Outcome:    OT / PT Evaluation complexity:FLOW([id number])@   Which of the following best describes the primary reason of therapy: Improving, restoring, or adapting functional mobility or skills  Visits Requested: 5  Date Range: 90 days  Select all conditions that apply: Arthritis Conditions

## 2025-08-13 ENCOUNTER — TELEPHONE (OUTPATIENT)
Dept: PRIMARY CARE | Facility: CLINIC | Age: 70
End: 2025-08-13
Payer: MEDICARE

## 2025-08-13 DIAGNOSIS — U07.1 COVID-19: Primary | ICD-10-CM

## 2025-08-13 RX ORDER — NIRMATRELVIR AND RITONAVIR 300-100 MG
3 KIT ORAL 2 TIMES DAILY
COMMUNITY
End: 2025-08-13 | Stop reason: SDUPTHER

## 2025-08-14 ENCOUNTER — APPOINTMENT (OUTPATIENT)
Dept: ALLERGY | Facility: CLINIC | Age: 70
End: 2025-08-14
Payer: MEDICARE

## 2025-08-14 RX ORDER — NIRMATRELVIR AND RITONAVIR 300-100 MG
3 KIT ORAL 2 TIMES DAILY
Qty: 30 TABLET | Refills: 0 | Status: SHIPPED | OUTPATIENT
Start: 2025-08-14

## 2025-08-15 ENCOUNTER — TELEPHONE (OUTPATIENT)
Dept: PRIMARY CARE | Facility: CLINIC | Age: 70
End: 2025-08-15
Payer: MEDICARE

## 2025-08-18 ENCOUNTER — APPOINTMENT (OUTPATIENT)
Dept: ENDOCRINOLOGY | Facility: CLINIC | Age: 70
End: 2025-08-18
Payer: MEDICARE

## 2025-08-23 ENCOUNTER — HOSPITAL ENCOUNTER (OUTPATIENT)
Facility: HOSPITAL | Age: 70
Setting detail: OBSERVATION
End: 2025-08-23
Attending: EMERGENCY MEDICINE | Admitting: INTERNAL MEDICINE
Payer: MEDICARE

## 2025-08-23 ENCOUNTER — APPOINTMENT (OUTPATIENT)
Dept: RADIOLOGY | Facility: HOSPITAL | Age: 70
End: 2025-08-23
Payer: MEDICARE

## 2025-08-23 ENCOUNTER — APPOINTMENT (OUTPATIENT)
Dept: CARDIOLOGY | Facility: HOSPITAL | Age: 70
End: 2025-08-23
Payer: MEDICARE

## 2025-08-23 DIAGNOSIS — L02.811 SCALP ABSCESS: Primary | ICD-10-CM

## 2025-08-23 DIAGNOSIS — F22 DELUSIONS OF PARASITOSIS: ICD-10-CM

## 2025-08-23 DIAGNOSIS — L03.811 CELLULITIS OF SCALP: ICD-10-CM

## 2025-08-23 LAB
ALBUMIN SERPL BCP-MCNC: 4.2 G/DL (ref 3.4–5)
ALP SERPL-CCNC: 88 U/L (ref 33–136)
ALT SERPL W P-5'-P-CCNC: 16 U/L (ref 7–45)
ANION GAP SERPL CALC-SCNC: 8 MMOL/L (ref 10–20)
APAP SERPL-MCNC: <10 UG/ML (ref ?–30)
AST SERPL W P-5'-P-CCNC: 20 U/L (ref 9–39)
BASOPHILS # BLD AUTO: 0.04 X10*3/UL (ref 0–0.1)
BASOPHILS NFR BLD AUTO: 0.6 %
BILIRUB SERPL-MCNC: 0.4 MG/DL (ref 0–1.2)
BUN SERPL-MCNC: 12 MG/DL (ref 6–23)
CALCIUM SERPL-MCNC: 9.7 MG/DL (ref 8.6–10.3)
CHLORIDE SERPL-SCNC: 104 MMOL/L (ref 98–107)
CO2 SERPL-SCNC: 31 MMOL/L (ref 21–32)
CREAT SERPL-MCNC: 1 MG/DL (ref 0.5–1.05)
EGFRCR SERPLBLD CKD-EPI 2021: 62 ML/MIN/1.73M*2
EOSINOPHIL # BLD AUTO: 0.1 X10*3/UL (ref 0–0.7)
EOSINOPHIL NFR BLD AUTO: 1.5 %
ERYTHROCYTE [DISTWIDTH] IN BLOOD BY AUTOMATED COUNT: 12.6 % (ref 11.5–14.5)
ETHANOL SERPL-MCNC: <10 MG/DL
GLUCOSE SERPL-MCNC: 88 MG/DL (ref 74–99)
HCT VFR BLD AUTO: 40.4 % (ref 36–46)
HGB BLD-MCNC: 12.6 G/DL (ref 12–16)
IMM GRANULOCYTES # BLD AUTO: 0.09 X10*3/UL (ref 0–0.7)
IMM GRANULOCYTES NFR BLD AUTO: 1.4 % (ref 0–0.9)
LYMPHOCYTES # BLD AUTO: 1.52 X10*3/UL (ref 1.2–4.8)
LYMPHOCYTES NFR BLD AUTO: 22.9 %
MCH RBC QN AUTO: 28.4 PG (ref 26–34)
MCHC RBC AUTO-ENTMCNC: 31.2 G/DL (ref 32–36)
MCV RBC AUTO: 91 FL (ref 80–100)
MONOCYTES # BLD AUTO: 0.45 X10*3/UL (ref 0.1–1)
MONOCYTES NFR BLD AUTO: 6.8 %
NEUTROPHILS # BLD AUTO: 4.45 X10*3/UL (ref 1.2–7.7)
NEUTROPHILS NFR BLD AUTO: 66.8 %
NRBC BLD-RTO: 0 /100 WBCS (ref 0–0)
PLATELET # BLD AUTO: 304 X10*3/UL (ref 150–450)
POTASSIUM SERPL-SCNC: 4 MMOL/L (ref 3.5–5.3)
PROT SERPL-MCNC: 6.4 G/DL (ref 6.4–8.2)
RBC # BLD AUTO: 4.43 X10*6/UL (ref 4–5.2)
SALICYLATES SERPL-MCNC: <3 MG/DL (ref ?–20)
SODIUM SERPL-SCNC: 139 MMOL/L (ref 136–145)
WBC # BLD AUTO: 6.7 X10*3/UL (ref 4.4–11.3)

## 2025-08-23 PROCEDURE — G0378 HOSPITAL OBSERVATION PER HR: HCPCS

## 2025-08-23 PROCEDURE — 96372 THER/PROPH/DIAG INJ SC/IM: CPT | Performed by: NURSE PRACTITIONER

## 2025-08-23 PROCEDURE — 80143 DRUG ASSAY ACETAMINOPHEN: CPT | Performed by: PHYSICIAN ASSISTANT

## 2025-08-23 PROCEDURE — 36415 COLL VENOUS BLD VENIPUNCTURE: CPT | Performed by: PHYSICIAN ASSISTANT

## 2025-08-23 PROCEDURE — 2500000004 HC RX 250 GENERAL PHARMACY W/ HCPCS (ALT 636 FOR OP/ED): Performed by: PHARMACIST

## 2025-08-23 PROCEDURE — 96367 TX/PROPH/DG ADDL SEQ IV INF: CPT

## 2025-08-23 PROCEDURE — 99285 EMERGENCY DEPT VISIT HI MDM: CPT | Mod: 25 | Performed by: EMERGENCY MEDICINE

## 2025-08-23 PROCEDURE — 2500000004 HC RX 250 GENERAL PHARMACY W/ HCPCS (ALT 636 FOR OP/ED): Performed by: PHYSICIAN ASSISTANT

## 2025-08-23 PROCEDURE — 80053 COMPREHEN METABOLIC PANEL: CPT | Performed by: PHYSICIAN ASSISTANT

## 2025-08-23 PROCEDURE — 93005 ELECTROCARDIOGRAM TRACING: CPT

## 2025-08-23 PROCEDURE — 70450 CT HEAD/BRAIN W/O DYE: CPT

## 2025-08-23 PROCEDURE — 96365 THER/PROPH/DIAG IV INF INIT: CPT

## 2025-08-23 PROCEDURE — 70450 CT HEAD/BRAIN W/O DYE: CPT | Performed by: STUDENT IN AN ORGANIZED HEALTH CARE EDUCATION/TRAINING PROGRAM

## 2025-08-23 PROCEDURE — 2500000001 HC RX 250 WO HCPCS SELF ADMINISTERED DRUGS (ALT 637 FOR MEDICARE OP): Performed by: PHYSICIAN ASSISTANT

## 2025-08-23 PROCEDURE — 99222 1ST HOSP IP/OBS MODERATE 55: CPT | Performed by: NURSE PRACTITIONER

## 2025-08-23 PROCEDURE — 80175 DRUG SCREEN QUAN LAMOTRIGINE: CPT | Mod: AHULAB | Performed by: NURSE PRACTITIONER

## 2025-08-23 PROCEDURE — 85025 COMPLETE CBC W/AUTO DIFF WBC: CPT | Performed by: PHYSICIAN ASSISTANT

## 2025-08-23 PROCEDURE — 2500000004 HC RX 250 GENERAL PHARMACY W/ HCPCS (ALT 636 FOR OP/ED): Performed by: NURSE PRACTITIONER

## 2025-08-23 RX ORDER — LITHIUM CARBONATE 300 MG/1
300 TABLET, FILM COATED, EXTENDED RELEASE ORAL DAILY
Status: DISCONTINUED | OUTPATIENT
Start: 2025-08-24 | End: 2025-08-25 | Stop reason: HOSPADM

## 2025-08-23 RX ORDER — CEFAZOLIN SODIUM 2 G/50ML
2 SOLUTION INTRAVENOUS ONCE
Status: COMPLETED | OUTPATIENT
Start: 2025-08-23 | End: 2025-08-23

## 2025-08-23 RX ORDER — POLYETHYLENE GLYCOL 3350 17 G/17G
17 POWDER, FOR SOLUTION ORAL DAILY PRN
Status: DISCONTINUED | OUTPATIENT
Start: 2025-08-23 | End: 2025-08-25 | Stop reason: HOSPADM

## 2025-08-23 RX ORDER — ENOXAPARIN SODIUM 100 MG/ML
40 INJECTION SUBCUTANEOUS EVERY 24 HOURS
Status: DISCONTINUED | OUTPATIENT
Start: 2025-08-23 | End: 2025-08-25 | Stop reason: HOSPADM

## 2025-08-23 RX ORDER — CETIRIZINE HYDROCHLORIDE 10 MG/1
10 TABLET ORAL ONCE
Status: DISCONTINUED | OUTPATIENT
Start: 2025-08-23 | End: 2025-08-23

## 2025-08-23 RX ORDER — LISDEXAMFETAMINE DIMESYLATE 60 MG/1
60 CAPSULE ORAL EVERY MORNING
Status: DISCONTINUED | OUTPATIENT
Start: 2025-08-24 | End: 2025-08-25 | Stop reason: HOSPADM

## 2025-08-23 RX ORDER — LAMOTRIGINE 100 MG/1
200 TABLET ORAL DAILY
Status: DISCONTINUED | OUTPATIENT
Start: 2025-08-24 | End: 2025-08-25 | Stop reason: HOSPADM

## 2025-08-23 RX ORDER — LIDOCAINE HYDROCHLORIDE 10 MG/ML
10 INJECTION, SOLUTION INFILTRATION; PERINEURAL ONCE
Status: COMPLETED | OUTPATIENT
Start: 2025-08-23 | End: 2025-08-23

## 2025-08-23 RX ORDER — VANCOMYCIN HYDROCHLORIDE 1 G/20ML
INJECTION, POWDER, LYOPHILIZED, FOR SOLUTION INTRAVENOUS DAILY PRN
Status: DISCONTINUED | OUTPATIENT
Start: 2025-08-23 | End: 2025-08-23 | Stop reason: WASHOUT

## 2025-08-23 RX ORDER — NAPROXEN 500 MG/1
500 TABLET ORAL ONCE
Status: COMPLETED | OUTPATIENT
Start: 2025-08-23 | End: 2025-08-23

## 2025-08-23 RX ORDER — PANTOPRAZOLE SODIUM 40 MG/1
40 TABLET, DELAYED RELEASE ORAL
Status: DISCONTINUED | OUTPATIENT
Start: 2025-08-24 | End: 2025-08-25 | Stop reason: HOSPADM

## 2025-08-23 RX ORDER — METOPROLOL SUCCINATE 25 MG/1
12.5 TABLET, EXTENDED RELEASE ORAL DAILY
Status: DISCONTINUED | OUTPATIENT
Start: 2025-08-24 | End: 2025-08-25 | Stop reason: HOSPADM

## 2025-08-23 RX ORDER — MIRTAZAPINE 15 MG/1
7.5 TABLET, FILM COATED ORAL NIGHTLY
Status: DISCONTINUED | OUTPATIENT
Start: 2025-08-23 | End: 2025-08-25 | Stop reason: HOSPADM

## 2025-08-23 RX ADMIN — CEFAZOLIN SODIUM 2 G: 2 SOLUTION INTRAVENOUS at 22:06

## 2025-08-23 RX ADMIN — LIDOCAINE HYDROCHLORIDE 10 ML: 10 INJECTION, SOLUTION INFILTRATION; PERINEURAL at 19:18

## 2025-08-23 RX ADMIN — VANCOMYCIN HYDROCHLORIDE 1250 MG: 1.25 INJECTION, POWDER, LYOPHILIZED, FOR SOLUTION INTRAVENOUS at 20:26

## 2025-08-23 RX ADMIN — ENOXAPARIN SODIUM 40 MG: 100 INJECTION SUBCUTANEOUS at 22:07

## 2025-08-23 RX ADMIN — NAPROXEN 500 MG: 500 TABLET ORAL at 20:37

## 2025-08-23 SDOH — SOCIAL STABILITY: SOCIAL INSECURITY: WITHIN THE LAST YEAR, HAVE YOU BEEN HUMILIATED OR EMOTIONALLY ABUSED IN OTHER WAYS BY YOUR PARTNER OR EX-PARTNER?: NO

## 2025-08-23 SDOH — SOCIAL STABILITY: SOCIAL INSECURITY: HAVE YOU HAD ANY THOUGHTS OF HARMING ANYONE ELSE?: NO

## 2025-08-23 SDOH — SOCIAL STABILITY: SOCIAL INSECURITY: WITHIN THE LAST YEAR, HAVE YOU BEEN AFRAID OF YOUR PARTNER OR EX-PARTNER?: NO

## 2025-08-23 SDOH — SOCIAL STABILITY: SOCIAL INSECURITY: WERE YOU ABLE TO COMPLETE ALL THE BEHAVIORAL HEALTH SCREENINGS?: YES

## 2025-08-23 SDOH — ECONOMIC STABILITY: FOOD INSECURITY: WITHIN THE PAST 12 MONTHS, YOU WORRIED THAT YOUR FOOD WOULD RUN OUT BEFORE YOU GOT THE MONEY TO BUY MORE.: NEVER TRUE

## 2025-08-23 SDOH — ECONOMIC STABILITY: INCOME INSECURITY: IN THE PAST 12 MONTHS HAS THE ELECTRIC, GAS, OIL, OR WATER COMPANY THREATENED TO SHUT OFF SERVICES IN YOUR HOME?: NO

## 2025-08-23 SDOH — ECONOMIC STABILITY: FOOD INSECURITY: WITHIN THE PAST 12 MONTHS, THE FOOD YOU BOUGHT JUST DIDN'T LAST AND YOU DIDN'T HAVE MONEY TO GET MORE.: NEVER TRUE

## 2025-08-23 SDOH — SOCIAL STABILITY: SOCIAL INSECURITY
WITHIN THE LAST YEAR, HAVE YOU BEEN RAPED OR FORCED TO HAVE ANY KIND OF SEXUAL ACTIVITY BY YOUR PARTNER OR EX-PARTNER?: NO

## 2025-08-23 SDOH — SOCIAL STABILITY: SOCIAL INSECURITY: DO YOU FEEL UNSAFE GOING BACK TO THE PLACE WHERE YOU ARE LIVING?: NO

## 2025-08-23 SDOH — SOCIAL STABILITY: SOCIAL INSECURITY: HAS ANYONE EVER THREATENED TO HURT YOUR FAMILY OR YOUR PETS?: NO

## 2025-08-23 SDOH — SOCIAL STABILITY: SOCIAL INSECURITY: DO YOU FEEL ANYONE HAS EXPLOITED OR TAKEN ADVANTAGE OF YOU FINANCIALLY OR OF YOUR PERSONAL PROPERTY?: NO

## 2025-08-23 SDOH — SOCIAL STABILITY: SOCIAL INSECURITY: DOES ANYONE TRY TO KEEP YOU FROM HAVING/CONTACTING OTHER FRIENDS OR DOING THINGS OUTSIDE YOUR HOME?: NO

## 2025-08-23 SDOH — SOCIAL STABILITY: SOCIAL INSECURITY: HAVE YOU HAD THOUGHTS OF HARMING ANYONE ELSE?: NO

## 2025-08-23 SDOH — SOCIAL STABILITY: SOCIAL INSECURITY: ARE THERE ANY APPARENT SIGNS OF INJURIES/BEHAVIORS THAT COULD BE RELATED TO ABUSE/NEGLECT?: NO

## 2025-08-23 SDOH — SOCIAL STABILITY: SOCIAL INSECURITY
WITHIN THE LAST YEAR, HAVE YOU BEEN KICKED, HIT, SLAPPED, OR OTHERWISE PHYSICALLY HURT BY YOUR PARTNER OR EX-PARTNER?: NO

## 2025-08-23 SDOH — SOCIAL STABILITY: SOCIAL INSECURITY: ARE YOU OR HAVE YOU BEEN THREATENED OR ABUSED PHYSICALLY, EMOTIONALLY, OR SEXUALLY BY ANYONE?: NO

## 2025-08-23 SDOH — SOCIAL STABILITY: SOCIAL INSECURITY: ABUSE: ADULT

## 2025-08-23 ASSESSMENT — PATIENT HEALTH QUESTIONNAIRE - PHQ9
SUM OF ALL RESPONSES TO PHQ9 QUESTIONS 1 & 2: 0
SUM OF ALL RESPONSES TO PHQ9 QUESTIONS 1 & 2: 0
2. FEELING DOWN, DEPRESSED OR HOPELESS: NOT AT ALL
1. LITTLE INTEREST OR PLEASURE IN DOING THINGS: NOT AT ALL
2. FEELING DOWN, DEPRESSED OR HOPELESS: NOT AT ALL
1. LITTLE INTEREST OR PLEASURE IN DOING THINGS: NOT AT ALL

## 2025-08-23 ASSESSMENT — ACTIVITIES OF DAILY LIVING (ADL)
HEARING - RIGHT EAR: FUNCTIONAL
FEEDING YOURSELF: INDEPENDENT
WALKS IN HOME: INDEPENDENT
JUDGMENT_ADEQUATE_SAFELY_COMPLETE_DAILY_ACTIVITIES: YES
LACK_OF_TRANSPORTATION: NO
BATHING: INDEPENDENT
DRESSING YOURSELF: INDEPENDENT
ADEQUATE_TO_COMPLETE_ADL: YES
BATHING: INDEPENDENT
WALKS IN HOME: INDEPENDENT
ADEQUATE_TO_COMPLETE_ADL: YES
PATIENT'S MEMORY ADEQUATE TO SAFELY COMPLETE DAILY ACTIVITIES?: YES
TOILETING: INDEPENDENT
GROOMING: INDEPENDENT
PATIENT'S MEMORY ADEQUATE TO SAFELY COMPLETE DAILY ACTIVITIES?: YES
TOILETING: INDEPENDENT
FEEDING YOURSELF: INDEPENDENT
HEARING - LEFT EAR: FUNCTIONAL
HEARING - RIGHT EAR: FUNCTIONAL
GROOMING: INDEPENDENT
HEARING - LEFT EAR: FUNCTIONAL
DRESSING YOURSELF: INDEPENDENT
JUDGMENT_ADEQUATE_SAFELY_COMPLETE_DAILY_ACTIVITIES: YES

## 2025-08-23 ASSESSMENT — COGNITIVE AND FUNCTIONAL STATUS - GENERAL
DAILY ACTIVITIY SCORE: 24
PATIENT BASELINE BEDBOUND: NO
DAILY ACTIVITIY SCORE: 24
MOBILITY SCORE: 24

## 2025-08-23 ASSESSMENT — LIFESTYLE VARIABLES
AUDIT-C TOTAL SCORE: 0
SKIP TO QUESTIONS 9-10: 1
HOW OFTEN DO YOU HAVE 6 OR MORE DRINKS ON ONE OCCASION: NEVER
HOW MANY STANDARD DRINKS CONTAINING ALCOHOL DO YOU HAVE ON A TYPICAL DAY: 1 OR 2
HOW OFTEN DO YOU HAVE A DRINK CONTAINING ALCOHOL: 2-4 TIMES A MONTH
HOW OFTEN DO YOU HAVE 6 OR MORE DRINKS ON ONE OCCASION: NEVER
AUDIT-C TOTAL SCORE: 0
HOW OFTEN DO YOU HAVE A DRINK CONTAINING ALCOHOL: NEVER
SUBSTANCE_ABUSE_PAST_12_MONTHS: NO
SKIP TO QUESTIONS 9-10: 1
AUDIT-C TOTAL SCORE: 2
PRESCIPTION_ABUSE_PAST_12_MONTHS: NO
HOW MANY STANDARD DRINKS CONTAINING ALCOHOL DO YOU HAVE ON A TYPICAL DAY: PATIENT DOES NOT DRINK
AUDIT-C TOTAL SCORE: 2

## 2025-08-23 ASSESSMENT — PAIN DESCRIPTION - PROGRESSION: CLINICAL_PROGRESSION: NOT CHANGED

## 2025-08-23 ASSESSMENT — PAIN DESCRIPTION - ONSET: ONSET: GRADUAL

## 2025-08-23 ASSESSMENT — PAIN DESCRIPTION - FREQUENCY: FREQUENCY: CONSTANT/CONTINUOUS

## 2025-08-23 ASSESSMENT — PAIN - FUNCTIONAL ASSESSMENT
PAIN_FUNCTIONAL_ASSESSMENT: 0-10

## 2025-08-23 ASSESSMENT — PAIN SCALES - GENERAL
PAINLEVEL_OUTOF10: 5 - MODERATE PAIN
PAINLEVEL_OUTOF10: 0 - NO PAIN
PAINLEVEL_OUTOF10: 5 - MODERATE PAIN
PAINLEVEL_OUTOF10: 2

## 2025-08-23 ASSESSMENT — PAIN DESCRIPTION - LOCATION: LOCATION: HEAD

## 2025-08-23 ASSESSMENT — PAIN DESCRIPTION - PAIN TYPE: TYPE: ACUTE PAIN

## 2025-08-23 ASSESSMENT — PAIN DESCRIPTION - ORIENTATION: ORIENTATION: RIGHT

## 2025-08-24 VITALS
TEMPERATURE: 97 F | RESPIRATION RATE: 17 BRPM | HEIGHT: 59 IN | SYSTOLIC BLOOD PRESSURE: 110 MMHG | DIASTOLIC BLOOD PRESSURE: 72 MMHG | WEIGHT: 133 LBS | BODY MASS INDEX: 26.81 KG/M2 | OXYGEN SATURATION: 98 % | HEART RATE: 84 BPM

## 2025-08-24 LAB
ALBUMIN SERPL BCP-MCNC: 3.4 G/DL (ref 3.4–5)
ALP SERPL-CCNC: 78 U/L (ref 33–136)
ALT SERPL W P-5'-P-CCNC: 12 U/L (ref 7–45)
AMPHETAMINES UR QL SCN: ABNORMAL
ANION GAP SERPL CALC-SCNC: 10 MMOL/L (ref 10–20)
AST SERPL W P-5'-P-CCNC: 16 U/L (ref 9–39)
BACTERIA BLD CULT: NORMAL
BACTERIA BLD CULT: NORMAL
BARBITURATES UR QL SCN: ABNORMAL
BENZODIAZ UR QL SCN: ABNORMAL
BILIRUB SERPL-MCNC: 0.2 MG/DL (ref 0–1.2)
BUN SERPL-MCNC: 16 MG/DL (ref 6–23)
BZE UR QL SCN: ABNORMAL
CALCIUM SERPL-MCNC: 9 MG/DL (ref 8.6–10.3)
CANNABINOIDS UR QL SCN: ABNORMAL
CHLORIDE SERPL-SCNC: 108 MMOL/L (ref 98–107)
CO2 SERPL-SCNC: 27 MMOL/L (ref 21–32)
CREAT SERPL-MCNC: 1.19 MG/DL (ref 0.5–1.05)
EGFRCR SERPLBLD CKD-EPI 2021: 50 ML/MIN/1.73M*2
ERYTHROCYTE [DISTWIDTH] IN BLOOD BY AUTOMATED COUNT: 12.8 % (ref 11.5–14.5)
FENTANYL+NORFENTANYL UR QL SCN: ABNORMAL
GLUCOSE SERPL-MCNC: 106 MG/DL (ref 74–99)
HCT VFR BLD AUTO: 33.6 % (ref 36–46)
HGB BLD-MCNC: 10.9 G/DL (ref 12–16)
LAMOTRIGINE SERPL-MCNC: 4.1 UG/ML (ref 2.5–15)
MCH RBC QN AUTO: 28.3 PG (ref 26–34)
MCHC RBC AUTO-ENTMCNC: 32.4 G/DL (ref 32–36)
MCV RBC AUTO: 87 FL (ref 80–100)
METHADONE UR QL SCN: ABNORMAL
NRBC BLD-RTO: 0 /100 WBCS (ref 0–0)
OPIATES UR QL SCN: ABNORMAL
OXYCODONE+OXYMORPHONE UR QL SCN: ABNORMAL
PCP UR QL SCN: ABNORMAL
PLATELET # BLD AUTO: 276 X10*3/UL (ref 150–450)
POTASSIUM SERPL-SCNC: 4.4 MMOL/L (ref 3.5–5.3)
PROT SERPL-MCNC: 5.3 G/DL (ref 6.4–8.2)
RBC # BLD AUTO: 3.85 X10*6/UL (ref 4–5.2)
SODIUM SERPL-SCNC: 141 MMOL/L (ref 136–145)
WBC # BLD AUTO: 6.4 X10*3/UL (ref 4.4–11.3)

## 2025-08-24 PROCEDURE — 2500000001 HC RX 250 WO HCPCS SELF ADMINISTERED DRUGS (ALT 637 FOR MEDICARE OP): Performed by: NURSE PRACTITIONER

## 2025-08-24 PROCEDURE — 87040 BLOOD CULTURE FOR BACTERIA: CPT | Mod: 59,AHULAB | Performed by: STUDENT IN AN ORGANIZED HEALTH CARE EDUCATION/TRAINING PROGRAM

## 2025-08-24 PROCEDURE — 2500000004 HC RX 250 GENERAL PHARMACY W/ HCPCS (ALT 636 FOR OP/ED): Performed by: NURSE PRACTITIONER

## 2025-08-24 PROCEDURE — G0378 HOSPITAL OBSERVATION PER HR: HCPCS

## 2025-08-24 PROCEDURE — 80307 DRUG TEST PRSMV CHEM ANLYZR: CPT | Performed by: NURSE PRACTITIONER

## 2025-08-24 PROCEDURE — 36415 COLL VENOUS BLD VENIPUNCTURE: CPT | Performed by: NURSE PRACTITIONER

## 2025-08-24 PROCEDURE — 2500000004 HC RX 250 GENERAL PHARMACY W/ HCPCS (ALT 636 FOR OP/ED): Performed by: STUDENT IN AN ORGANIZED HEALTH CARE EDUCATION/TRAINING PROGRAM

## 2025-08-24 PROCEDURE — 85027 COMPLETE CBC AUTOMATED: CPT | Performed by: NURSE PRACTITIONER

## 2025-08-24 PROCEDURE — 96372 THER/PROPH/DIAG INJ SC/IM: CPT | Mod: 59 | Performed by: NURSE PRACTITIONER

## 2025-08-24 PROCEDURE — 96366 THER/PROPH/DIAG IV INF ADDON: CPT

## 2025-08-24 PROCEDURE — 36415 COLL VENOUS BLD VENIPUNCTURE: CPT | Performed by: STUDENT IN AN ORGANIZED HEALTH CARE EDUCATION/TRAINING PROGRAM

## 2025-08-24 PROCEDURE — 87081 CULTURE SCREEN ONLY: CPT | Mod: AHULAB | Performed by: STUDENT IN AN ORGANIZED HEALTH CARE EDUCATION/TRAINING PROGRAM

## 2025-08-24 PROCEDURE — 84075 ASSAY ALKALINE PHOSPHATASE: CPT | Performed by: NURSE PRACTITIONER

## 2025-08-24 PROCEDURE — 99232 SBSQ HOSP IP/OBS MODERATE 35: CPT | Performed by: STUDENT IN AN ORGANIZED HEALTH CARE EDUCATION/TRAINING PROGRAM

## 2025-08-24 RX ORDER — VANCOMYCIN HYDROCHLORIDE 1 G/200ML
1000 INJECTION, SOLUTION INTRAVENOUS EVERY 24 HOURS
Status: DISCONTINUED | OUTPATIENT
Start: 2025-08-24 | End: 2025-08-25

## 2025-08-24 RX ORDER — HYDROXYZINE HYDROCHLORIDE 25 MG/1
25 TABLET, FILM COATED ORAL ONCE
Status: COMPLETED | OUTPATIENT
Start: 2025-08-24 | End: 2025-08-24

## 2025-08-24 RX ORDER — CEFAZOLIN SODIUM 2 G/50ML
2 SOLUTION INTRAVENOUS EVERY 12 HOURS
Status: DISCONTINUED | OUTPATIENT
Start: 2025-08-24 | End: 2025-08-25 | Stop reason: HOSPADM

## 2025-08-24 RX ORDER — VANCOMYCIN HYDROCHLORIDE 1 G/20ML
INJECTION, POWDER, LYOPHILIZED, FOR SOLUTION INTRAVENOUS DAILY PRN
Status: DISCONTINUED | OUTPATIENT
Start: 2025-08-24 | End: 2025-08-25 | Stop reason: HOSPADM

## 2025-08-24 RX ADMIN — SODIUM CHLORIDE 1000 ML: 9 INJECTION, SOLUTION INTRAVENOUS at 09:10

## 2025-08-24 RX ADMIN — HYDROXYZINE HYDROCHLORIDE 25 MG: 25 TABLET, FILM COATED ORAL at 03:50

## 2025-08-24 RX ADMIN — METOPROLOL SUCCINATE 12.5 MG: 25 TABLET, EXTENDED RELEASE ORAL at 09:10

## 2025-08-24 RX ADMIN — CEFAZOLIN SODIUM 2 G: 2 SOLUTION INTRAVENOUS at 21:27

## 2025-08-24 RX ADMIN — ENOXAPARIN SODIUM 40 MG: 100 INJECTION SUBCUTANEOUS at 21:27

## 2025-08-24 RX ADMIN — CEFAZOLIN SODIUM 2 G: 2 SOLUTION INTRAVENOUS at 09:10

## 2025-08-24 RX ADMIN — MIRTAZAPINE 7.5 MG: 15 TABLET, FILM COATED ORAL at 21:28

## 2025-08-24 RX ADMIN — VANCOMYCIN HYDROCHLORIDE 1000 MG: 1 INJECTION, SOLUTION INTRAVENOUS at 10:24

## 2025-08-24 SDOH — ECONOMIC STABILITY: HOUSING INSECURITY

## 2025-08-24 SDOH — ECONOMIC STABILITY: GENERAL

## 2025-08-24 SDOH — ECONOMIC STABILITY: FOOD INSECURITY: WITHIN THE PAST 12 MONTHS, THE FOOD YOU BOUGHT JUST DIDN'T LAST AND YOU DIDN'T HAVE MONEY TO GET MORE.: NEVER TRUE

## 2025-08-24 SDOH — ECONOMIC STABILITY: HOUSING INSECURITY: IN THE PAST 12 MONTHS HAS THE ELECTRIC, GAS, OIL, OR WATER COMPANY THREATENED TO SHUT OFF SERVICES IN YOUR HOME?: NO

## 2025-08-24 SDOH — ECONOMIC STABILITY: TRANSPORTATION INSECURITY: IN THE PAST 12 MONTHS, HAS LACK OF TRANSPORTATION KEPT YOU FROM MEDICAL APPOINTMENTS OR FROM GETTING MEDICATIONS?: NO

## 2025-08-24 SDOH — ECONOMIC STABILITY: INCOME INSECURITY: IN THE LAST 12 MONTHS, WAS THERE A TIME WHEN YOU WERE NOT ABLE TO PAY THE MORTGAGE OR RENT ON TIME?: NO

## 2025-08-24 SDOH — ECONOMIC STABILITY: FOOD INSECURITY

## 2025-08-24 SDOH — ECONOMIC STABILITY: FOOD INSECURITY: WITHIN THE PAST 12 MONTHS, YOU WORRIED THAT YOUR FOOD WOULD RUN OUT BEFORE YOU GOT MONEY TO BUY MORE.: NEVER TRUE

## 2025-08-24 SDOH — ECONOMIC STABILITY: TRANSPORTATION INSECURITY

## 2025-08-24 SDOH — ECONOMIC STABILITY: HOUSING INSECURITY: IN THE LAST 12 MONTHS, HOW MANY PLACES HAVE YOU LIVED?: 1

## 2025-08-24 SDOH — ECONOMIC STABILITY: TRANSPORTATION INSECURITY
IN THE PAST 12 MONTHS, HAS LACK OF TRANSPORTATION KEPT YOU FROM MEETINGS, WORK, OR FROM GETTING THINGS NEEDED FOR DAILY LIVING?: NO

## 2025-08-24 SDOH — ECONOMIC STABILITY: TRANSPORTATION INSECURITY
IN THE PAST 12 MONTHS, HAS THE LACK OF TRANSPORTATION KEPT YOU FROM MEDICAL APPOINTMENTS OR FROM GETTING MEDICATIONS?: NO

## 2025-08-24 SDOH — ECONOMIC STABILITY: HOUSING INSECURITY
IN THE LAST 12 MONTHS, WAS THERE A TIME WHEN YOU DID NOT HAVE A STEADY PLACE TO SLEEP OR SLEPT IN A SHELTER (INCLUDING NOW)?: NO

## 2025-08-24 SDOH — ECONOMIC STABILITY: HOUSING INSECURITY: IN THE LAST 12 MONTHS, WAS THERE A TIME WHEN YOU WERE NOT ABLE TO PAY THE MORTGAGE OR RENT ON TIME?: NO

## 2025-08-24 SDOH — ECONOMIC STABILITY: FOOD INSECURITY: WITHIN THE PAST 12 MONTHS, YOU WORRIED THAT YOUR FOOD WOULD RUN OUT BEFORE YOU GOT THE MONEY TO BUY MORE.: NEVER TRUE

## 2025-08-24 ASSESSMENT — PAIN SCALES - GENERAL
PAINLEVEL_OUTOF10: 0 - NO PAIN
PAINLEVEL_OUTOF10: 0 - NO PAIN

## 2025-08-24 ASSESSMENT — ENCOUNTER SYMPTOMS: WOUND: 1

## 2025-08-24 ASSESSMENT — SOCIAL DETERMINANTS OF HEALTH (SDOH): IN THE PAST 12 MONTHS, HAS THE ELECTRIC, GAS, OIL, OR WATER COMPANY THREATENED TO SHUT OFF SERVICE IN YOUR HOME?: NO

## 2025-08-24 ASSESSMENT — ACTIVITIES OF DAILY LIVING (ADL): LACK_OF_TRANSPORTATION: NO

## 2025-08-24 ASSESSMENT — PAIN - FUNCTIONAL ASSESSMENT
PAIN_FUNCTIONAL_ASSESSMENT: 0-10
PAIN_FUNCTIONAL_ASSESSMENT: 0-10

## 2025-08-25 ENCOUNTER — APPOINTMENT (OUTPATIENT)
Facility: CLINIC | Age: 70
End: 2025-08-25
Payer: MEDICARE

## 2025-08-25 ENCOUNTER — PHARMACY VISIT (OUTPATIENT)
Dept: PHARMACY | Facility: CLINIC | Age: 70
End: 2025-08-25
Payer: COMMERCIAL

## 2025-08-25 VITALS
TEMPERATURE: 98.2 F | RESPIRATION RATE: 19 BRPM | HEIGHT: 59 IN | SYSTOLIC BLOOD PRESSURE: 146 MMHG | OXYGEN SATURATION: 99 % | DIASTOLIC BLOOD PRESSURE: 93 MMHG | WEIGHT: 133 LBS | BODY MASS INDEX: 26.81 KG/M2 | HEART RATE: 74 BPM

## 2025-08-25 LAB
ALBUMIN SERPL BCP-MCNC: 3.6 G/DL (ref 3.4–5)
ALP SERPL-CCNC: 70 U/L (ref 33–136)
ALT SERPL W P-5'-P-CCNC: 9 U/L (ref 7–45)
ANION GAP SERPL CALC-SCNC: 10 MMOL/L (ref 10–20)
AST SERPL W P-5'-P-CCNC: 14 U/L (ref 9–39)
BILIRUB SERPL-MCNC: 0.2 MG/DL (ref 0–1.2)
BUN SERPL-MCNC: 11 MG/DL (ref 6–23)
CALCIUM SERPL-MCNC: 8.7 MG/DL (ref 8.6–10.3)
CHLORIDE SERPL-SCNC: 110 MMOL/L (ref 98–107)
CO2 SERPL-SCNC: 25 MMOL/L (ref 21–32)
CREAT SERPL-MCNC: 0.85 MG/DL (ref 0.5–1.05)
CRP SERPL-MCNC: 1.14 MG/DL
EGFRCR SERPLBLD CKD-EPI 2021: 75 ML/MIN/1.73M*2
ERYTHROCYTE [DISTWIDTH] IN BLOOD BY AUTOMATED COUNT: 12.7 % (ref 11.5–14.5)
GLUCOSE SERPL-MCNC: 86 MG/DL (ref 74–99)
HCT VFR BLD AUTO: 34.8 % (ref 36–46)
HGB BLD-MCNC: 11.5 G/DL (ref 12–16)
MCH RBC QN AUTO: 28.8 PG (ref 26–34)
MCHC RBC AUTO-ENTMCNC: 33 G/DL (ref 32–36)
MCV RBC AUTO: 87 FL (ref 80–100)
NRBC BLD-RTO: 0 /100 WBCS (ref 0–0)
PLATELET # BLD AUTO: 281 X10*3/UL (ref 150–450)
POTASSIUM SERPL-SCNC: 3.7 MMOL/L (ref 3.5–5.3)
PROT SERPL-MCNC: 5.9 G/DL (ref 6.4–8.2)
RBC # BLD AUTO: 4 X10*6/UL (ref 4–5.2)
SODIUM SERPL-SCNC: 141 MMOL/L (ref 136–145)
VANCOMYCIN TROUGH SERPL-MCNC: 9.2 UG/ML (ref 5–20)
WBC # BLD AUTO: 6 X10*3/UL (ref 4.4–11.3)

## 2025-08-25 PROCEDURE — G0378 HOSPITAL OBSERVATION PER HR: HCPCS

## 2025-08-25 PROCEDURE — 2500000004 HC RX 250 GENERAL PHARMACY W/ HCPCS (ALT 636 FOR OP/ED)

## 2025-08-25 PROCEDURE — 36415 COLL VENOUS BLD VENIPUNCTURE: CPT | Performed by: NURSE PRACTITIONER

## 2025-08-25 PROCEDURE — RXMED WILLOW AMBULATORY MEDICATION CHARGE

## 2025-08-25 PROCEDURE — 96365 THER/PROPH/DIAG IV INF INIT: CPT

## 2025-08-25 PROCEDURE — 84075 ASSAY ALKALINE PHOSPHATASE: CPT | Performed by: NURSE PRACTITIONER

## 2025-08-25 PROCEDURE — 86140 C-REACTIVE PROTEIN: CPT | Performed by: INTERNAL MEDICINE

## 2025-08-25 PROCEDURE — 80202 ASSAY OF VANCOMYCIN: CPT | Performed by: STUDENT IN AN ORGANIZED HEALTH CARE EDUCATION/TRAINING PROGRAM

## 2025-08-25 PROCEDURE — 99239 HOSP IP/OBS DSCHRG MGMT >30: CPT | Performed by: INTERNAL MEDICINE

## 2025-08-25 PROCEDURE — 2500000001 HC RX 250 WO HCPCS SELF ADMINISTERED DRUGS (ALT 637 FOR MEDICARE OP): Performed by: NURSE PRACTITIONER

## 2025-08-25 PROCEDURE — 96375 TX/PRO/DX INJ NEW DRUG ADDON: CPT

## 2025-08-25 PROCEDURE — 85027 COMPLETE CBC AUTOMATED: CPT | Performed by: NURSE PRACTITIONER

## 2025-08-25 PROCEDURE — 2500000004 HC RX 250 GENERAL PHARMACY W/ HCPCS (ALT 636 FOR OP/ED): Performed by: STUDENT IN AN ORGANIZED HEALTH CARE EDUCATION/TRAINING PROGRAM

## 2025-08-25 RX ORDER — CEFADROXIL 500 MG/1
500 CAPSULE ORAL 2 TIMES DAILY
Qty: 14 CAPSULE | Refills: 0 | Status: SHIPPED | OUTPATIENT
Start: 2025-08-25 | End: 2025-09-01

## 2025-08-25 RX ORDER — HYDROXYZINE HYDROCHLORIDE 25 MG/1
25 TABLET, FILM COATED ORAL 2 TIMES DAILY PRN
Qty: 20 TABLET | Refills: 0 | Status: SHIPPED | OUTPATIENT
Start: 2025-08-25

## 2025-08-25 RX ORDER — DOXYCYCLINE 100 MG/1
100 CAPSULE ORAL 2 TIMES DAILY
Qty: 14 CAPSULE | Refills: 0 | Status: SHIPPED | OUTPATIENT
Start: 2025-08-25 | End: 2025-09-01

## 2025-08-25 RX ORDER — VANCOMYCIN HYDROCHLORIDE 750 MG/150ML
750 INJECTION, SOLUTION INTRAVENOUS EVERY 12 HOURS
Status: DISCONTINUED | OUTPATIENT
Start: 2025-08-25 | End: 2025-08-25 | Stop reason: HOSPADM

## 2025-08-25 RX ADMIN — PANTOPRAZOLE SODIUM 40 MG: 40 TABLET, DELAYED RELEASE ORAL at 06:42

## 2025-08-25 RX ADMIN — VANCOMYCIN HYDROCHLORIDE 750 MG: 750 INJECTION, SOLUTION INTRAVENOUS at 11:01

## 2025-08-25 RX ADMIN — CEFAZOLIN SODIUM 2 G: 2 SOLUTION INTRAVENOUS at 09:42

## 2025-08-25 SDOH — ECONOMIC STABILITY: HOUSING INSECURITY: IN THE LAST 12 MONTHS, WAS THERE A TIME WHEN YOU WERE NOT ABLE TO PAY THE MORTGAGE OR RENT ON TIME?: NO

## 2025-08-25 SDOH — ECONOMIC STABILITY: HOUSING INSECURITY: AT ANY TIME IN THE PAST 12 MONTHS, WERE YOU HOMELESS OR LIVING IN A SHELTER (INCLUDING NOW)?: NO

## 2025-08-25 SDOH — ECONOMIC STABILITY: FOOD INSECURITY: HOW HARD IS IT FOR YOU TO PAY FOR THE VERY BASICS LIKE FOOD, HOUSING, MEDICAL CARE, AND HEATING?: NOT VERY HARD

## 2025-08-25 SDOH — ECONOMIC STABILITY: TRANSPORTATION INSECURITY: IN THE PAST 12 MONTHS, HAS LACK OF TRANSPORTATION KEPT YOU FROM MEDICAL APPOINTMENTS OR FROM GETTING MEDICATIONS?: NO

## 2025-08-25 ASSESSMENT — COGNITIVE AND FUNCTIONAL STATUS - GENERAL
DAILY ACTIVITIY SCORE: 24
MOBILITY SCORE: 24

## 2025-08-25 ASSESSMENT — PAIN - FUNCTIONAL ASSESSMENT: PAIN_FUNCTIONAL_ASSESSMENT: 0-10

## 2025-08-25 ASSESSMENT — ACTIVITIES OF DAILY LIVING (ADL): LACK_OF_TRANSPORTATION: NO

## 2025-08-25 ASSESSMENT — PAIN SCALES - GENERAL: PAINLEVEL_OUTOF10: 0 - NO PAIN

## 2025-08-26 ENCOUNTER — PATIENT OUTREACH (OUTPATIENT)
Dept: CARE COORDINATION | Facility: CLINIC | Age: 70
End: 2025-08-26
Payer: MEDICARE

## 2025-08-26 LAB — STAPHYLOCOCCUS SPEC CULT: NORMAL

## 2025-08-27 LAB
ATRIAL RATE: 73 BPM
P AXIS: 69 DEGREES
P OFFSET: 198 MS
P ONSET: 143 MS
PR INTERVAL: 154 MS
Q ONSET: 220 MS
QRS COUNT: 12 BEATS
QRS DURATION: 76 MS
QT INTERVAL: 394 MS
QTC CALCULATION(BAZETT): 434 MS
QTC FREDERICIA: 420 MS
R AXIS: 7 DEGREES
T AXIS: 18 DEGREES
T OFFSET: 417 MS
VENTRICULAR RATE: 73 BPM

## 2025-08-28 LAB
BACTERIA BLD CULT: NORMAL
BACTERIA BLD CULT: NORMAL

## 2025-09-05 DIAGNOSIS — J45.901 EXACERBATION OF ASTHMA, UNSPECIFIED ASTHMA SEVERITY, UNSPECIFIED WHETHER PERSISTENT (HHS-HCC): Primary | ICD-10-CM

## 2025-09-10 ENCOUNTER — APPOINTMENT (OUTPATIENT)
Dept: DERMATOLOGY | Facility: CLINIC | Age: 70
End: 2025-09-10
Payer: MEDICARE

## 2025-10-07 ENCOUNTER — APPOINTMENT (OUTPATIENT)
Dept: BEHAVIORAL HEALTH | Facility: CLINIC | Age: 70
End: 2025-10-07
Payer: MEDICARE

## 2025-10-14 ENCOUNTER — APPOINTMENT (OUTPATIENT)
Dept: ALLERGY | Facility: CLINIC | Age: 70
End: 2025-10-14
Payer: MEDICARE

## 2026-03-09 ENCOUNTER — APPOINTMENT (OUTPATIENT)
Dept: ALLERGY | Facility: CLINIC | Age: 71
End: 2026-03-09
Payer: MEDICARE

## 2026-07-28 ENCOUNTER — APPOINTMENT (OUTPATIENT)
Dept: OPHTHALMOLOGY | Facility: CLINIC | Age: 71
End: 2026-07-28
Payer: MEDICARE